# Patient Record
Sex: FEMALE | Race: WHITE | NOT HISPANIC OR LATINO | Employment: FULL TIME | URBAN - METROPOLITAN AREA
[De-identification: names, ages, dates, MRNs, and addresses within clinical notes are randomized per-mention and may not be internally consistent; named-entity substitution may affect disease eponyms.]

---

## 2017-02-03 ENCOUNTER — ALLSCRIPTS OFFICE VISIT (OUTPATIENT)
Dept: OTHER | Facility: OTHER | Age: 32
End: 2017-02-03

## 2017-02-03 ENCOUNTER — LAB REQUISITION (OUTPATIENT)
Dept: LAB | Facility: HOSPITAL | Age: 32
End: 2017-02-03
Payer: COMMERCIAL

## 2017-02-03 DIAGNOSIS — N92.6 IRREGULAR MENSTRUATION: ICD-10-CM

## 2017-02-03 DIAGNOSIS — Z11.3 ENCOUNTER FOR SCREENING FOR INFECTIONS WITH PREDOMINANTLY SEXUAL MODE OF TRANSMISSION: ICD-10-CM

## 2017-02-03 DIAGNOSIS — Z01.419 ENCOUNTER FOR GYNECOLOGICAL EXAMINATION WITHOUT ABNORMAL FINDING: ICD-10-CM

## 2017-02-03 PROCEDURE — 87624 HPV HI-RISK TYP POOLED RSLT: CPT | Performed by: PHYSICIAN ASSISTANT

## 2017-02-03 PROCEDURE — G0145 SCR C/V CYTO,THINLAYER,RESCR: HCPCS | Performed by: PHYSICIAN ASSISTANT

## 2017-02-07 LAB — HPV RRNA GENITAL QL NAA+PROBE: NORMAL

## 2017-02-08 LAB
LAB AP GYN PRIMARY INTERPRETATION: NORMAL
Lab: NORMAL

## 2017-02-28 ENCOUNTER — GENERIC CONVERSION - ENCOUNTER (OUTPATIENT)
Dept: OTHER | Facility: OTHER | Age: 32
End: 2017-02-28

## 2017-03-30 ENCOUNTER — GENERIC CONVERSION - ENCOUNTER (OUTPATIENT)
Dept: OTHER | Facility: OTHER | Age: 32
End: 2017-03-30

## 2017-04-03 ENCOUNTER — GENERIC CONVERSION - ENCOUNTER (OUTPATIENT)
Dept: OTHER | Facility: OTHER | Age: 32
End: 2017-04-03

## 2017-05-03 ENCOUNTER — ALLSCRIPTS OFFICE VISIT (OUTPATIENT)
Dept: OTHER | Facility: OTHER | Age: 32
End: 2017-05-03

## 2017-07-05 ENCOUNTER — TRANSCRIBE ORDERS (OUTPATIENT)
Dept: ADMINISTRATIVE | Facility: HOSPITAL | Age: 32
End: 2017-07-05

## 2017-07-05 ENCOUNTER — APPOINTMENT (OUTPATIENT)
Dept: LAB | Facility: HOSPITAL | Age: 32
End: 2017-07-05
Payer: COMMERCIAL

## 2017-07-05 DIAGNOSIS — Z11.1 SCREENING EXAMINATION FOR PULMONARY TUBERCULOSIS: Primary | ICD-10-CM

## 2017-07-05 DIAGNOSIS — Z11.1 SCREENING EXAMINATION FOR PULMONARY TUBERCULOSIS: ICD-10-CM

## 2017-07-05 PROCEDURE — 86480 TB TEST CELL IMMUN MEASURE: CPT

## 2017-07-05 PROCEDURE — 36415 COLL VENOUS BLD VENIPUNCTURE: CPT

## 2017-07-07 LAB
ANNOTATION COMMENT IMP: NORMAL
GAMMA INTERFERON BACKGROUND BLD IA-ACNC: 0.04 IU/ML
M TB IFN-G BLD-IMP: NEGATIVE
M TB IFN-G CD4+ BCKGRND COR BLD-ACNC: <0.01 IU/ML
M TB IFN-G CD4+ T-CELLS BLD-ACNC: 0.03 IU/ML
MITOGEN IGNF BLD-ACNC: 8.22 IU/ML
QUANTIFERON-TB GOLD IN TUBE: NORMAL
SERVICE CMNT-IMP: NORMAL

## 2017-08-04 ENCOUNTER — TRANSCRIBE ORDERS (OUTPATIENT)
Dept: ADMINISTRATIVE | Facility: HOSPITAL | Age: 32
End: 2017-08-04

## 2017-08-04 ENCOUNTER — APPOINTMENT (OUTPATIENT)
Dept: LAB | Facility: HOSPITAL | Age: 32
End: 2017-08-04
Payer: COMMERCIAL

## 2017-08-04 DIAGNOSIS — Z00.8 HEALTH EXAMINATION IN POPULATION SURVEY: ICD-10-CM

## 2017-08-04 DIAGNOSIS — Z00.8 HEALTH EXAMINATION IN POPULATION SURVEY: Primary | ICD-10-CM

## 2017-08-04 LAB
CHOLEST SERPL-MCNC: 148 MG/DL (ref 50–200)
EST. AVERAGE GLUCOSE BLD GHB EST-MCNC: 120 MG/DL
HBA1C MFR BLD: 5.8 % (ref 4.2–6.3)
HDLC SERPL-MCNC: 40 MG/DL (ref 40–60)
LDLC SERPL CALC-MCNC: 89 MG/DL (ref 0–100)
TRIGL SERPL-MCNC: 93 MG/DL

## 2017-08-04 PROCEDURE — 80061 LIPID PANEL: CPT

## 2017-08-04 PROCEDURE — 83036 HEMOGLOBIN GLYCOSYLATED A1C: CPT

## 2017-09-29 ENCOUNTER — TRANSCRIBE ORDERS (OUTPATIENT)
Dept: ADMINISTRATIVE | Facility: HOSPITAL | Age: 32
End: 2017-09-29

## 2017-09-29 ENCOUNTER — APPOINTMENT (OUTPATIENT)
Dept: LAB | Facility: HOSPITAL | Age: 32
End: 2017-09-29
Payer: COMMERCIAL

## 2017-09-29 DIAGNOSIS — Z11.1 SCREENING EXAMINATION FOR PULMONARY TUBERCULOSIS: ICD-10-CM

## 2017-09-29 DIAGNOSIS — Z11.1 SCREENING EXAMINATION FOR PULMONARY TUBERCULOSIS: Primary | ICD-10-CM

## 2017-09-29 PROCEDURE — 36415 COLL VENOUS BLD VENIPUNCTURE: CPT

## 2017-09-29 PROCEDURE — 86480 TB TEST CELL IMMUN MEASURE: CPT

## 2017-10-01 LAB
ANNOTATION COMMENT IMP: NORMAL
GAMMA INTERFERON BACKGROUND BLD IA-ACNC: 0.04 IU/ML
M TB IFN-G BLD-IMP: NEGATIVE
M TB IFN-G CD4+ BCKGRND COR BLD-ACNC: 0.04 IU/ML
M TB IFN-G CD4+ T-CELLS BLD-ACNC: 0.08 IU/ML
MITOGEN IGNF BLD-ACNC: >10 IU/ML
QUANTIFERON-TB GOLD IN TUBE: NORMAL
SERVICE CMNT-IMP: NORMAL

## 2017-10-25 ENCOUNTER — APPOINTMENT (OUTPATIENT)
Dept: LAB | Facility: HOSPITAL | Age: 32
End: 2017-10-25
Payer: COMMERCIAL

## 2017-10-25 ENCOUNTER — GENERIC CONVERSION - ENCOUNTER (OUTPATIENT)
Dept: OTHER | Facility: OTHER | Age: 32
End: 2017-10-25

## 2017-10-25 DIAGNOSIS — N92.6 IRREGULAR MENSTRUATION: ICD-10-CM

## 2017-10-25 LAB — B-HCG SERPL-ACNC: <2 MIU/ML

## 2017-10-25 PROCEDURE — 36415 COLL VENOUS BLD VENIPUNCTURE: CPT

## 2017-10-25 PROCEDURE — 84702 CHORIONIC GONADOTROPIN TEST: CPT

## 2017-10-26 ENCOUNTER — GENERIC CONVERSION - ENCOUNTER (OUTPATIENT)
Dept: OTHER | Facility: OTHER | Age: 32
End: 2017-10-26

## 2018-01-10 NOTE — PROGRESS NOTES
Assessment    1  Encounter for gynecological examination without abnormal finding (V72 31) (Z01 419)    Plan  Encounter for gynecological examination without abnormal finding    · Follow-up visit in 1 year Evaluation and Treatment  Follow-up  Status: Hold For -  Scheduling  Requested for: 10XWE7146   Ordered; For: Encounter for gynecological examination without abnormal finding; Ordered By: Juvenal Marina Performed:  Due: 53UIQ2778  Infertility    · 3 - Dwayne Javier MD, Love Gallagher  (Obstetrics/Gynecology) Physician Referral  Consult  Status: Hold  For - Scheduling  Requested for: 19XDN6454   Ordered; For: Infertility; Ordered By: Juvenal  Performed:  Due: 41LOK5994  are Referring to a non- Preferred Provider : Services not provided in network  Care Summary provided  : Yes    Discussion/Summary  healthy adult female the risks and benefits of cervical cancer screening were discussed Pap test with reflex HPV testing was done today Testing was done today for chlamydia and gonorrhea  Breast cancer screening: the risks and benefits of breast cancer screening were discussed and monthly self breast exam was advised  Advice and education were given regarding weight bearing exercise, calcium supplements and vitamin D supplements  Chief Complaint  New pt here for her yearly exam, c/o trying to conceive X 4 years  History of Present Illness  HPI: 35year old white female here for yearly exam, no complaints  Cycles are regular, not heavy or crampy  She is  for 5 years and has not used contraception in that time  She had not actively tried for pregnancy until the last year but had not prevented, either  She has no history of chlamydia, pelvic trauma or surgery, nor has her   We discussed starting with a semen analysis  She takes a prenatal vitamin when she remembers  GYN HM, Adult Female [facility]: The patient is being seen for a health maintenance evaluation  General Health:  The patient's health since the last visit is described as good  Lifestyle:  She does not exercise regularly  She does not use tobacco  She consumes alcohol  She reports occasional alcohol use  She denies drug use  Reproductive health: the patient is premenopausal   she reports no menstrual problems  she uses no contraception  she is sexually active  she denies prior pregnancies  Screening: Cervical cancer screening includes uncertain timing of her last pap smear  Active Problems    1  Allergic rhinitis (477 9) (J30 9)   2  Flank pain (789 09) (R10 9)   3  Lumbar strain (847 2) (S39 012A)   4  Moderate persistent asthma (493 90) (J45 40)    Past Medical History    · History of Abdominal pain, suprapubic (789 09) (R10 30)   · History of Cough (786 2) (R05)   · History of Depression screening (V79 0) (Z13 89)   · History of Eustachian tube dysfunction (381 81) (H69 80)   · History of acute sinusitis (V12 69) (Z87 09)   · History of otitis media (V12 49) (Z86 69)   · History of Skin lesion (709 9) (L98 9)    Surgical History    · Denied: History Of Prior Surgery    Family History    · Family history of multiple sclerosis (V17 2) (Z82 0)   · Family history of skin cancer (V16 8) (Z80 8)    · Family history of diabetes mellitus (DM) (V18 0) (Z83 3)   · Family history of pericarditis (V17 49) (Z82 49)    Social History    · Alcohol use (V49 89) (F10 99)   · Currently sexually active   · Denied: History of Drug use   · Former smoker (B65 72) (N12 907)   ·    · No drug use   · Social alcohol use (F10 99)    Current Meds   1  Advair Diskus 250-50 MCG/DOSE Inhalation Aerosol Powder Breath Activated; inhale 1   puff by mouth twice a day; Therapy: 45EJF9075 to (Last Rx:02Oct2015)  Requested for: 02Oct2015 Ordered   2  Montelukast Sodium 10 MG Oral Tablet; TAKE 1 TABLET DAILY ( OFFICE VISIT   NEEDED); Therapy: 75LAB8680 to (Last Rx:02Oct2015)  Requested for: 02Oct2015 Ordered   3   Ventolin  (90 Base) MCG/ACT Inhalation Aerosol Solution; INHALE 2 PUFFS   EVERY 4 HOURS AS NEEDED; Therapy: 80RQG5870 to (Evaluate:19Apr2016)  Requested for: 02Oct2015; Last   Rx:02Oct2015 Ordered    Allergies    1  Sulfa Drugs    Vitals   Recorded: 23HCO7443 06:89OM   Systolic 602, LUE, Sitting   Diastolic 78, LUE, Sitting   Height 5 ft 7 5 in   Weight 248 lb    BMI Calculated 38 27   BSA Calculated 2 23   LMP 21Jan2016     Physical Exam    Constitutional   General appearance: No acute distress, well appearing and well nourished  Neck   Neck: Normal, supple, trachea midline, no masses  Genitourinary   External genitalia: Normal and no lesions appreciated  Vagina: Normal, no lesions or dryness appreciated  Urethra: Normal     Urethral meatus: Normal     Bladder: Normal, soft, non-tender and no prolapse or masses appreciated  Cervix: Normal, no palpable masses  Uterus: Normal, non-tender, not enlarged, and no palpable masses  Adnexa/parametria: Normal, non-tender and no fullness or masses appreciated  Chest   Breasts: Normal and no dimpling or skin changes noted  Abdomen   Abdomen: Normal, non-tender, and no organomegaly noted  Lymphatic   Palpation of lymph nodes in neck, axillae, groin and/or other locations: No lymphadenopathy or masses noted         Future Appointments    Date/Time Provider Specialty Site   02/02/2017 08:00 AM Judi Rios [facility] Obstetrics/Gynecology [facility]     Signatures   Electronically signed by : Ramos Tomas [facility]; Jan 28 2016 11:40AM EST                       (Author)    Electronically signed by : LUIS Ruiz ; Jan 28 2016  1:01PM EST

## 2018-01-11 NOTE — MISCELLANEOUS
Message   Recorded as Task   Date: 11/03/2016 11:06 AM, Created By: Priyank Camacho   Task Name: Call Back   Assigned To: Ronda Castro   Regarding Patient: Jordy Boston, Status: In Progress   Comment:    Priyank Camacho - 03 Nov 2016 11:06 AM     TASK CREATED  Caller: Self; General Medical Question; [phone] (Home)  pt called - was given an rx for her  to go & have sperm tested - they misplaced - requeting new to be mailed & a call to pt with recomendations as to where to go  please advise  [facility] [place] Nov 2016 11:24 AM     TASK EDITED  Not sure how to write requisition  I think you want  to have sa at [facility]? Imani Jamil - 29 Nov 2016 11:29 AM     TASK REPLIED TO: Previously Assigned To Pixtr is on my computer  I recommend Dr Wayne Tinajero - 39 Nov 2016 12:17 PM     TASK IN PROGRESS   Samantha Tinajero - 22 Nov 2016 12:31 PM     TASK EDITED  I lm for pt - shall I send rx for s/a  I also left [employer] [facility] # on her phone   [address] Nov 2016 3:11 PM     TASK EDITED  Rx sent to pt        Active Problems    1  Allergic rhinitis (477 9) (J30 9)   2  Encounter for gynecological examination without abnormal finding (V72 31) (Z01 419)   3  Infertility   4  Lumbar strain (847 2) (S39 012A)   5  Moderate persistent asthma (493 90) (J45 40)   6  Screening for STD (sexually transmitted disease) (V74 5) (Z11 3)    Current Meds   1  Albuterol Sulfate (2 5 MG/3ML) 0 083% Inhalation Nebulization Solution; Inhale 1 unit   dose twice a day and every 4-6 hours by nebulizer for shortness of   breath or wheezing; Therapy: 57EIO3450 to [phone])  Requested for: 66ZPE2542; Last   Rx:29Jun2016 Ordered   2  Breo Ellipta 200-25 MCG/INH Inhalation Aerosol Powder Breath Activated; INHALE 1   PUFFS Daily; Therapy: 65DEX9301 to (Evaluate:09Vfz5184)  Requested for: 87Pwv2425; Last   Rx:87Qqj0037 Ordered   3   Cetirizine HCl - 10 MG Oral Tablet; TAKE 1 TABLET DAILY AS DIRECTED; Therapy: 13OAN2439 to (Evaluate:66Iod0844); Last Rx:14Jun2016 Ordered   4  Ventolin  (90 Base) MCG/ACT Inhalation Aerosol Solution; INHALE 2 PUFFS   EVERY 4 HOURS AS NEEDED; Therapy: 58GPT4992 to (Evaluate:22Jun2017)  Requested for: 06YCX6517; Last   Rx:27Jun2016 Ordered    Allergies    1  Sulfa Drugs    Signatures   Electronically signed by :  Jose Raul Etienne, ; Nov  3 2016  3:11PM EST                       (Author)

## 2018-01-11 NOTE — MISCELLANEOUS
Message   Recorded as Task   Date: 03/30/2017 12:46 PM, Created By: [facility]   Task Name: Care Coordination   Assigned To: Hernando Marcelino   Regarding Patient: Ernesto Neal, Status: In Progress   William Herrmann - 30 Mar 2017 12:46 PM     TASK CREATED  "Sylvie Ledesma" from [employer] [facility] pts partner "Shaq Johnson" was in for seamen analysis  States the Rx did not have a diagnosis code on it  They are requesting updated Rx  Their Phone is [phone], fax is [phone]  [facility] - 30 Mar 2017 1:04 PM     TASK IN PROGRESS   [facility] - 30 Mar 2017 1:14 PM     TASK EDITED  I have an index card (my own) ambrosio Haines uses [id number] [phone]   [facility] - 30 Mar 2017 1:15 PM     TASK EDITED  It is investigative testing for male   Or do you have a code I can use? Imani Huff - 30 Mar 2017 1:21 PM     TASK REPLIED TO: Previously Assigned To Imani Huff  dx would be primary infertility   [facility] - 30 Mar 2017 1:39 PM     TASK EDITED  I called and gave them the code n 97 9 - primary infertility        Active Problems    1  Allergic rhinitis (477 9) (J30 9)   2  Encounter for gynecological examination without abnormal finding (V72 31) (Z01 419)   3  Infertility   4  Irregular menstrual cycle (626 4) (N92 6)   5  Lumbar strain (847 2) (S39 012A)   6  Moderate persistent asthma (493 90) (J45 40)   7  Screening for STD (sexually transmitted disease) (V74 5) (Z11 3)    Current Meds   1  Breo Ellipta 200-25 MCG/INH Inhalation Aerosol Powder Breath Activated; INHALE 1   PUFFS Daily; Therapy: 94FUS1253 to (Evaluate:22Lgj9795)  Requested for: 63Dzc7345; Last   Rx:63Ijm1123 Ordered   2  Cetirizine HCl - 10 MG Oral Tablet; TAKE 1 TABLET DAILY AS DIRECTED; Therapy: 67EZE8323 to (Evaluate:94Svj1880); Last Rx:14Jun2016 Ordered   3  CitraNatal [place] 27-1-260 MG Oral Capsule; One po daily; Therapy: 87HVD1917 to (Last Rx:37Wpt9667)  Requested for: 42Oto7390 Ordered   4   Ventolin  (90 Base) MCG/ACT Inhalation Aerosol Solution; INHALE 2 PUFFS   EVERY 4 HOURS AS NEEDED; Therapy: 03MKR3835 to (Evaluate:22Jun2017)  Requested for: 15NSC1355; Last   Rx:27Jun2016 Ordered    Allergies    1  Sulfa Drugs    Signatures   Electronically signed by :  Fransico Etienne, ; Mar 30 2017  1:39PM EST                       (Author)

## 2018-01-12 NOTE — MISCELLANEOUS
Message   Recorded as Task   Date: 10/25/2017 02:19 PM, Created By: Sachi Lincoln   Task Name: Go to Result   Assigned To: Charlotte Calvillo   Regarding Patient: Tiffany Ochoa, Status: In Progress   Comment:    Imani Huff - 25 Oct 2017 2:19 PM     TASK CREATED  please let dayne know that her HCG is negative  Thx   Yadira Owusu - 25 Oct 2017 2:20 PM     TASK IN PROGRESS   Yadira Owusu - 25 Oct 2017 2:23 PM     TASK EDITED  Patient is aware of results  Active Problems    1  Allergic rhinitis (477 9) (J30 9)   2  Encounter for gynecological examination without abnormal finding (V72 31) (Z01 419)   3  Infertility   4  Irregular menstrual cycle (626 4) (N92 6)   5  Lumbar strain (847 2) (S39 012A)   6  Moderate persistent asthma (493 90) (J45 40)   7  Nausea and vomiting (787 01) (R11 2)   8  Screening for STD (sexually transmitted disease) (V74 5) (Z11 3)    Current Meds   1  Breo Ellipta 200-25 MCG/INH Inhalation Aerosol Powder Breath Activated; take one   inhalation a day; Therapy: 40MFD3634 to (JUBTVVFD:93DIV6882)  Requested for: 50Agn6998; Last   Rx:18Cpv1890; Status: ACTIVE - Renewal Denied Ordered   2  Ondansetron 8 MG Oral Tablet Disintegrating; TAKE 1 TABLET Every 8 hours PRN; Therapy: 10KHP1128 to (Kyra Collet)  Requested for: 04PJO5183; Last   Rx:61Pyi8764 Ordered   3  Ventolin  (90 Base) MCG/ACT Inhalation Aerosol Solution; INHALE 2 PUFFS   EVERY 4-6 HOURS AS NEEDED; Therapy: 62UXM3718 to (Evaluate:06Iiw8701)  Requested for: 95BNP5070; Last   Rx:13Gwy6773 Ordered    Allergies    1   Sulfa Drugs    Signatures   Electronically signed by : Sapphire Merino, ; Oct 25 2017  2:23PM EST                       (Author)

## 2018-01-13 VITALS
HEIGHT: 67 IN | WEIGHT: 247 LBS | HEART RATE: 96 BPM | DIASTOLIC BLOOD PRESSURE: 80 MMHG | TEMPERATURE: 98.1 F | SYSTOLIC BLOOD PRESSURE: 106 MMHG | BODY MASS INDEX: 38.77 KG/M2

## 2018-01-14 VITALS
BODY MASS INDEX: 39.24 KG/M2 | WEIGHT: 250 LBS | SYSTOLIC BLOOD PRESSURE: 120 MMHG | HEIGHT: 67 IN | DIASTOLIC BLOOD PRESSURE: 72 MMHG

## 2018-01-14 NOTE — MISCELLANEOUS
Message   Recorded as Task   Date: 04/03/2017 12:17 PM, Created By: Swapna Johnson   Task Name: Call Back   Assigned To: Andreina Muñoz   Regarding Patient: Barbara Andrew, Status: In Progress   Comment:    Caitlyn Gross - 03 Apr 2017 12:17 PM     TASK CREATED  Caller: Self; Results Inquiry; [phone] (Home)  Pt calling for result of husbands semen analysis   Sherrie Corona - 03 Apr 2017 12:24 PM     TASK IN PROGRESS   CjSherrie - 03 Apr 2017 12:32 PM     TASK EDITED  pt called req current sa done 1-1 1/2 weeks ago    she will call christin lugo for result to be faxed and req last sa to be faxed to her at [phone]        Active Problems    1  Allergic rhinitis (477 9) (J30 9)   2  Encounter for gynecological examination without abnormal finding (V72 31) (Z01 419)   3  Infertility   4  Irregular menstrual cycle (626 4) (N92 6)   5  Lumbar strain (847 2) (S39 012A)   6  Moderate persistent asthma (493 90) (J45 40)   7  Screening for STD (sexually transmitted disease) (V74 5) (Z11 3)    Current Meds   1  Breo Ellipta 200-25 MCG/INH Inhalation Aerosol Powder Breath Activated; INHALE 1   PUFFS Daily; Therapy: 81GNX0918 to (Evaluate:46Fun1191)  Requested for: 02Hho3869; Last   Rx:96Xje8502 Ordered   2  Cetirizine HCl - 10 MG Oral Tablet; TAKE 1 TABLET DAILY AS DIRECTED; Therapy: 89LPJ9391 to (Evaluate:31Fvr9191); Last Rx:14Jun2016 Ordered   3  CitraNatal [place] 27-1-260 MG Oral Capsule; One po daily; Therapy: 05VTJ2003 to (Last Rx:84Nks3052)  Requested for: 03Syq8546 Ordered   4  Ventolin  (90 Base) MCG/ACT Inhalation Aerosol Solution; INHALE 2 PUFFS   EVERY 4 HOURS AS NEEDED; Therapy: 77TQL7951 to (Evaluate:22Jun2017)  Requested for: 29GFS2061; Last   Rx:27Jun2016 Ordered    Allergies    1   Sulfa Drugs    Signatures   Electronically signed by : Vane Sandoval, ; Apr  3 2017 12:32PM EST                       (Author)

## 2018-01-15 ENCOUNTER — HOSPITAL ENCOUNTER (OUTPATIENT)
Dept: ULTRASOUND IMAGING | Facility: HOSPITAL | Age: 33
Discharge: HOME | End: 2018-01-15
Payer: COMMERCIAL

## 2018-01-15 ENCOUNTER — ALLSCRIPTS OFFICE VISIT (OUTPATIENT)
Dept: OTHER | Facility: OTHER | Age: 33
End: 2018-01-15

## 2018-01-15 ENCOUNTER — APPOINTMENT (OUTPATIENT)
Dept: LAB | Facility: HOSPITAL | Age: 33
End: 2018-01-15
Payer: COMMERCIAL

## 2018-01-15 DIAGNOSIS — Z32.01 ENCOUNTER FOR PREGNANCY TEST, RESULT POSITIVE: ICD-10-CM

## 2018-01-15 DIAGNOSIS — N93.9 ABNORMAL UTERINE AND VAGINAL BLEEDING, UNSPECIFIED (CODE): ICD-10-CM

## 2018-01-15 LAB
ABO GROUP BLD: NORMAL
B-HCG SERPL-ACNC: ABNORMAL MIU/ML
BLD GP AB SCN SERPL QL: NEGATIVE
RH BLD: POSITIVE
SPECIMEN EXPIRATION DATE: NORMAL

## 2018-01-15 PROCEDURE — 36415 COLL VENOUS BLD VENIPUNCTURE: CPT

## 2018-01-15 PROCEDURE — 86900 BLOOD TYPING SEROLOGIC ABO: CPT

## 2018-01-15 PROCEDURE — 86901 BLOOD TYPING SEROLOGIC RH(D): CPT

## 2018-01-15 PROCEDURE — 76801 OB US < 14 WKS SINGLE FETUS: CPT

## 2018-01-15 PROCEDURE — 84702 CHORIONIC GONADOTROPIN TEST: CPT

## 2018-01-15 PROCEDURE — 86850 RBC ANTIBODY SCREEN: CPT

## 2018-01-16 NOTE — MISCELLANEOUS
Message   Recorded as Task   Date: 10/25/2017 07:12 AM, Created By: Awilda Gutiererz   Task Name: Medical Complaint Callback   Assigned To: Hernando Marcelino   Regarding Patient: Ernesto Neal, Status: In Progress   Comment:    Caitlyn Gross - 25 Oct 2017 7:12 AM     TASK CREATED  Caller: Self; Medical Complaint; [phone] (Home)  Pt believes she conceived last week and is bleeding  Pt has a history of miscarriage and would like order for blood test   Pt would like to speak with nurse ASAP  [facility] - 25 Oct 2017 8:41 AM     TASK IN PROGRESS   [facility] - 25 Oct 2017 8:53 AM     TASK EDITED  Pt trying desperately to conceive  !!  Said her lmp 10/9 thru 10/13  She said she ovulated 17 th and 18  Began spotting yesterday  (She uses ovulation kits)  She said this happened 2 mos ago and she spotted till period began  She said same thing happened a yr ago  I do not think she is pregnant but pt wants hcg - I said I needed your opinion  Has seen [facility] in past   Thanks   Imani Huff - 25 Oct 2017 9:07 AM     TASK REPLIED TO: Previously Assigned To Imani Huff  If she wants an HCG we can send her for one    Next step is VERNON  They can help straighten out her periods while helping her to get pregnant     [facility] - 25 Oct 2017 9:21 AM     TASK EDITED  Marleni Linda faxed to [facility] for [address] - 25 Oct 2017 9:25 AM     TASK EDITED  Check hcg tomorrow   [facility] - 25 Oct 2017 9:26 AM     TASK EDITED   Kenia Garcia - 25 Oct 2017 10:59 AM     TASK EDITED   Stephanie Hart - 26 Oct 2017 8:30 AM     TASK IN PROGRESS   Chen Holly - 26 Oct 2017 8:31 AM     TASK EDITED   [facility] - 26 Oct 2017 8:32 AM     TASK EDITED  lmtcb   [facility] - 26 Oct 2017 8:32 AM     TASK IN PROGRESS   [facility] - 26 Oct 2017 9:22 AM     TASK EDITED  Pt does not have infertility on her plan - will be added to husbands plan in Jan - will then see Dr Macarena Farah Phu Mask - 26 Oct 2017 9:22 AM     TASK EDITED        Active Problems    1  Allergic rhinitis (477 9) (J30 9)   2  Encounter for gynecological examination without abnormal finding (V72 31) (Z01 419)   3  Infertility   4  Irregular menstrual cycle (626 4) (N92 6)   5  Lumbar strain (847 2) (S39 012A)   6  Moderate persistent asthma (493 90) (J45 40)   7  Nausea and vomiting (787 01) (R11 2)   8  Screening for STD (sexually transmitted disease) (V74 5) (Z11 3)    Current Meds   1  Breo Ellipta 200-25 MCG/INH Inhalation Aerosol Powder Breath Activated; take one   inhalation a day; Therapy: 33UNA7996 to (MMNVDWWR:96EWD1752)  Requested for: 94Clc3735; Last   Rx:13Gzc7446; Status: ACTIVE - Renewal Denied Ordered   2  Ondansetron 8 MG Oral Tablet Disintegrating; TAKE 1 TABLET Every 8 hours PRN; Therapy: 19TOC9175 to (Guille Agrawal)  Requested for: 99CPR3234; Last   Rx:41Lfu0794 Ordered   3  Ventolin  (90 Base) MCG/ACT Inhalation Aerosol Solution; INHALE 2 PUFFS   EVERY 4-6 HOURS AS NEEDED; Therapy: 18JNC4575 to (Evaluate:13Ogl4490)  Requested for: 07XHA1332; Last   Rx:41Jym2503 Ordered    Allergies    1  Sulfa Drugs    Signatures   Electronically signed by :  Laura Etienne, ; Oct 26 2017  9:22AM EST                       (Author)

## 2018-01-16 NOTE — MISCELLANEOUS
Message   Recorded as Task   Date: 01/07/2016 09:00 AM, Created By: Pam Bentley   Task Name: Follow Up   Assigned To: Melissa Farfan   Regarding Patient: Kayleigh , Status: In Progress   Comment:    Brittney Jenkins - 07 Jan 2016 9:00 AM     TASK CREATED  Caller: Self; General Medical Question; [phone] (Home); [phone] (Work)  PLEASE CALL PT REGARDING HER Z-PACK  SHE BELIEVES THAT THE 3 DAY DOSE WILL NOT BE ENOUGH AND WAS UNDER THE IMPRESSION THAT IT WOULD BE A 5 DAY DOSE  PLEASE ADVISE   [phone] IS HER CALL BACK NUMBER  THANK YOU  [facility]   Melissa Farfan - 07 Jan 2016 11:34 AM     TASK IN PROGRESS   Melissa Farfan - 07 Jan 2016 11:36 AM     TASK REASSIGNED: Previously Assigned To Kamala,Clinical Team  do you recall if you wanted pt to have 5 days of zithromax? Melissa Farfan - 08 Jan 2016 10:29 AM     TASK REASSIGNED: Previously Assigned To Melissa Farfan Timothy - 08 Jan 2016 10:38 AM     TASK REPLIED TO: Previously Assigned To Du Washburn  No I wanted Zithromax 500 mg one a day for 3 days  This is equal efficacy as the 250 for 5 days  They both are in the system for 10 days   Melissa Farfan - 12 Jan 2016 1:24 PM     TASK EDITED  [facility] informing pt of TS response        Active Problems    1  Acute sinusitis (461 9) (J01 90)   2  Allergic rhinitis (477 9) (J30 9)   3  Cough (786 2) (R05)   4  Depression screening (V79 0) (Z13 89)   5  Eustachian tube dysfunction (381 81) (H69 80)   6  Flank pain (789 09) (R10 9)   7  Lumbar strain (847 2) (S39 012A)   8  Moderate persistent asthma (493 90) (J45 40)   9  Otitis media (382 9) (H66 90)    Current Meds   1  Advair Diskus 250-50 MCG/DOSE Inhalation Aerosol Powder Breath Activated; inhale 1   puff by mouth twice a day; Therapy: 05MJW1240 to (Last Rx:02Oct2015)  Requested for: 02Oct2015 Ordered   2  Azelastine HCl - 0 1 % Nasal Solution; USE 2 SPRAYS IN EACH       NOSTRIL TWICE   DAILY;    Therapy: 07DHL5975 to (Last FJ:17RTO0226)  Requested for: 19QJP4291 Ordered   3  Azithromycin 500 MG Oral Tablet; TAKE 1 TABLET DAILY WITH FOOD; Therapy: 43ACR4748 to (Last Rx:06Jan2016)  Requested for: 70TWI6237 Ordered   4  Montelukast Sodium 10 MG Oral Tablet (Singulair); TAKE 1 TABLET DAILY ( OFFICE   VISIT NEEDED); Therapy: 54ZGW6289 to (Last Rx:02Oct2015)  Requested for: 02Oct2015 Ordered   5  Promethazine-DM 6 25-15 MG/5ML Oral Syrup; TAKE 5 ML EVERY 4 TO 6 HOURS AS   NEEDED; Therapy: 52DCB2110 to (Last Rx:06Jan2016)  Requested for: 94EVR3982 Ordered   6  Ventolin  (90 Base) MCG/ACT Inhalation Aerosol Solution; INHALE 2 PUFFS   EVERY 4 HOURS AS NEEDED; Therapy: 75PIC1428 to (Evaluate:19Apr2016)  Requested for: 02Oct2015; Last   Rx:02Oct2015 Ordered    Allergies    1   Sulfa Drugs    Signatures   Electronically signed by : Lien Matute, ; Jan 12 2016  1:25PM EST                       (Author)

## 2018-01-17 NOTE — MISCELLANEOUS
Message   Recorded as Task   Date: 02/27/2017 01:50 PM, Created By: Carly Myles   Task Name: Call Back   Assigned To: Ronda Castro   Regarding Patient: Jordy Boston, Status: In Progress   Comment:    Jeannie Lee - 27 Feb 2017 1:50 PM     TASK CREATED  PT CALLED FOR RESULTS OF HER HUSBANDS SEMEN ANYALSIS  [phone]   Edith Baca - 27 Feb 2017 2:10 PM     TASK REASSIGNED: Previously Assigned To Pramod Dye - 27 Feb 2017 4:04 PM     TASK EDITED  Pt said he went to [employer]  and she said Maile Cox was faxed to our office already  I truly don't know where to look  [facility] and have them fax his sa to gyn fax  Husbands name is [employer]  BD 11/29/79  Pt wants to be called as soon as we get it!!   Samantha Tinajero - 27 Feb 2017 4:04 PM     TASK EDITED  Await fax   Samantha Tinajero - 27 Feb 2017 4:04 PM     TASK IN PROGRESS   Samantha Tinajero - 27 Feb 2017 4:31 PM     TASK EDITED  I missed the sa on chart  Please let us know F/U  Sorry and thank you   Imani Huff - 28 Feb 2017 7:35 AM     TASK REPLIED TO: Previously Assigned To Imani Huff  volume and number of normal shaped sperm are normal, but percentage of sperm actively moving in the right direction is slightly low  They recommend repeat semen analysis  Can either give her a slip for this or send them there for evaluation  Sherrie Corona - 28 Feb 2017 7:43 AM     TASK EDITED  lm for pt tcb   Sherrie Corona - 28 Feb 2017 8:03 AM     TASK EDITED  spoke [facility] pt, she requests that aslip for repeat sa be mailed to her    done        Active Problems    1  Allergic rhinitis (477 9) (J30 9)   2  Encounter for gynecological examination without abnormal finding (V72 31) (Z01 419)   3  Infertility   4  Irregular menstrual cycle (626 4) (N92 6)   5  Lumbar strain (847 2) (S39 012A)   6  Moderate persistent asthma (493 90) (J45 40)   7  Screening for STD (sexually transmitted disease) (V74 5) (Z11 3)    Current Meds   1   Breo Ellipta 200-25 MCG/INH Inhalation Aerosol Powder Breath Activated; INHALE 1   PUFFS Daily; Therapy: 97PXF4882 to (Evaluate:97Bsm3264)  Requested for: 41Gqi0574; Last   Rx:92Ksy7585 Ordered   2  Cetirizine HCl - 10 MG Oral Tablet; TAKE 1 TABLET DAILY AS DIRECTED; Therapy: 79QAG9839 to (Evaluate:87Jaz7146); Last Rx:14Jun2016 Ordered   3  CitraNatal [place] 27-1-260 MG Oral Capsule; One po daily; Therapy: 34EBM3969 to (Last Rx:03Feb2017)  Requested for: 03Feb2017 Ordered   4  Ventolin  (90 Base) MCG/ACT Inhalation Aerosol Solution; INHALE 2 PUFFS   EVERY 4 HOURS AS NEEDED; Therapy: 06ATK9091 to (Evaluate:22Jun2017)  Requested for: 69BZL2397; Last   Rx:27Jun2016 Ordered    Allergies    1   Sulfa Drugs    Signatures   Electronically signed by : Maia Jane, ; Feb 28 2017  8:03AM EST                       (Author)

## 2018-01-18 NOTE — MISCELLANEOUS
Message   Recorded as Task   Date: 11/03/2016 11:06 AM, Created By: Elena Mckeon   Task Name: Call Back   Assigned To: Dori Stiles   Regarding Patient: Bisi John, Status: In Progress   Comment:    Elena Pereiradle - 03 Nov 2016 11:06 AM     TASK CREATED  Caller: Self; General Medical Question; [phone] (Home)  pt called - was given an rx for her  to go & have sperm tested - they misplaced - requeting new to be mailed & a call to pt with recomendations as to where to go  please advise  [address] [place] [address] Nov 2016 11:24 AM     TASK EDITED  Not sure how to write requisition  I think you want  to have sa at [facility]? Imani Jamil - 47 Nov 2016 11:29 AM     TASK REPLIED TO: Previously Assigned To Consult A Doctor is on my computer  I recommend Dr Wayne Anthony - 02 Nov 2016 12:17 PM     TASK IN PROGRESS   Rosalinda Anthony - 89 Nov 2016 12:31 PM     TASK EDITED  I lm for pt - shall I send rx for s/a  I also left [facility] # on her phone   [address] [address] Nov 2016 3:11 PM     TASK EDITED  Rx sent to pt   Rosalinda Anthony - 95 Nov 2016 3:11 PM     TASK COMPLETED   Rosalinda Anthony - 10 Nov 2016 5:02 PM     TASK REACTIVATED   Rosalinda Anthony - 10 Nov 2016 5:04 PM     TASK EDITED  I think pt recalled today - I got info from [employer]  I lm for pt - to cb  Originally [facility] recommended [facility], but pt can go to [facility] or [facility] - 10 Nov 2016 5:05 PM     TASK EDITED   lmtcb   Rosalinda Anthony - 10 Nov 2016 5:05 PM     TASK IN PROGRESS   Laurie Corona - 11 Nov 2016 7:58 AM     TASK EDITED  will call again 11/18   Laurie Corona - 18 Nov 2016 11:26 AM     TASK IN PROGRESS   Laurie Corona - 18 Nov 2016 11:26 AM     TASK EDITED   lm for pt with names of fertility specialists        Active Problems    1  Allergic rhinitis (477 9) (J30 9)   2   Encounter for gynecological examination without abnormal finding (V72 31) (Z01 419)   3  Infertility   4  Lumbar strain (847 2) (S39 012A)   5  Moderate persistent asthma (493 90) (J45 40)   6  Screening for STD (sexually transmitted disease) (V74 5) (Z11 3)    Current Meds   1  Albuterol Sulfate (2 5 MG/3ML) 0 083% Inhalation Nebulization Solution; Inhale 1 unit   dose twice a day and every 4-6 hours by nebulizer for shortness of   breath or wheezing; Therapy: 42PEM4616 to (03 [phone])  Requested for: 66FKB2736; Last   Rx:29Jun2016 Ordered   2  Breo Ellipta 200-25 MCG/INH Inhalation Aerosol Powder Breath Activated; INHALE 1   PUFFS Daily; Therapy: 82TRB7873 to (Evaluate:30Ans2358)  Requested for: 75Pam8330; Last   Rx:59Qka3040 Ordered   3  Cetirizine HCl - 10 MG Oral Tablet; TAKE 1 TABLET DAILY AS DIRECTED; Therapy: 31REZ1479 to (Evaluate:23Qgg1152); Last Rx:14Jun2016 Ordered   4  Ventolin  (90 Base) MCG/ACT Inhalation Aerosol Solution; INHALE 2 PUFFS   EVERY 4 HOURS AS NEEDED; Therapy: 70XSU1319 to (Evaluate:22Jun2017)  Requested for: 90YTD6995; Last   Rx:27Jun2016 Ordered    Allergies    1   Sulfa Drugs    Signatures   Electronically signed by : German Benavides, ; Nov 18 2016 11:26AM EST                       (Author)

## 2018-01-23 VITALS
BODY MASS INDEX: 40.67 KG/M2 | DIASTOLIC BLOOD PRESSURE: 80 MMHG | SYSTOLIC BLOOD PRESSURE: 130 MMHG | WEIGHT: 259.13 LBS | HEIGHT: 67 IN

## 2018-01-23 NOTE — MISCELLANEOUS
Message  Return to work or school:   Kavita Kendrick is under my professional care  She was seen in my office on 01/15/2018   Please excuse patient from leaving work early on Saturday 1/13/18  Due to current health condition  Cherie Grullon, JAUN, CRNP        Signatures   Electronically signed by : Cherie Grullon, [facility]; Jan 19 2018  2:06PM EST                       (Author)

## 2018-01-24 ENCOUNTER — OFFICE VISIT (OUTPATIENT)
Dept: OBGYN CLINIC | Facility: CLINIC | Age: 33
End: 2018-01-24

## 2018-01-24 VITALS — BODY MASS INDEX: 40.57 KG/M2 | WEIGHT: 259 LBS | DIASTOLIC BLOOD PRESSURE: 76 MMHG | SYSTOLIC BLOOD PRESSURE: 114 MMHG

## 2018-01-24 DIAGNOSIS — N91.2 AMENORRHEA: Primary | ICD-10-CM

## 2018-01-24 LAB — FET CRL US.MEAS: 1.6 MM

## 2018-01-24 RX ORDER — ALBUTEROL SULFATE 90 UG/1
2 AEROSOL, METERED RESPIRATORY (INHALATION) EVERY 4 HOURS PRN
COMMUNITY
Start: 2012-02-09 | End: 2019-11-25 | Stop reason: SDUPTHER

## 2018-01-24 RX ORDER — FLUTICASONE FUROATE AND VILANTEROL 200; 25 UG/1; UG/1
1 POWDER RESPIRATORY (INHALATION) DAILY
COMMUNITY
Start: 2016-06-14 | End: 2018-10-23 | Stop reason: ALTCHOICE

## 2018-01-25 NOTE — PROGRESS NOTES
Early OB Ultrasound Procedure Note    Technician: Study performed by the interpreting physician assistant    Indications:  Early gestation, dating and viability    Procedure Details   A gestational sac is seen containing a yolk sac and a cruz embryo  No subchorionic lucency is noted  The embryonic crown-rump length calculates to an estimated gestational age of 11 weeks, 0 days  Embryonic cardiac activity is seen at a rate of 185 b/min  Findings:  Viable, cruz intrauterine pregnancy at 8 weeks, 0 days, which is consistent with an EDC of 9/5/18 by LMP      FINAL SHI 9/5/18 by LMP

## 2018-01-25 NOTE — PATIENT INSTRUCTIONS
Pregnancy   AMBULATORY CARE:   What you need to know about pregnancy:  A normal pregnancy lasts about 40 weeks  The first trimester lasts from your last period through the 12th week of pregnancy  The second trimester lasts from the 13th week of your pregnancy through the 23rd week  The third trimester lasts from your 24th week of pregnancy until your baby is born  If you know the date of your last period, your healthcare provider can estimate your due date  You may give birth to your baby any time from 37 weeks to 2 weeks after your due date  Seek care immediately if:   · You develop a severe headache that does not go away  · You have new or increased vision changes, such as blurred or spotted vision  · You have new or increased swelling in your face or hands  · You have pain or cramping in your abdomen or low back  · You have vaginal bleeding  Contact your healthcare provider or obstetrician if:   · You have abdominal cramps, pressure, or tightening  · You have a change in vaginal discharge  · You cannot keep food or drinks down, and you are losing weight  · You have chills or a fever  · You have vaginal itching, burning, or pain  · You have yellow, green, white, or foul-smelling vaginal discharge  · You have pain or burning when you urinate, less urine than usual, or pink or bloody urine  · You have questions or concerns about your condition or care  Body changes that may occur during your pregnancy:   · Breast changes  you will experience include tenderness and tingling during the early part of your pregnancy  Your breasts will become larger  You may need to use a support bra  You may see a thin, yellow fluid, called colostrum, leak from your nipples during the second trimester  Colostrum is a liquid that changes to milk about 3 days after you give birth  · Skin changes and stretch marks  may occur during your pregnancy   You may have red marks, called stretch marks, on your skin  Stretch marks will usually fade after pregnancy  Use lotion if your skin is dry and itchy  The skin on your face, around your nipples, and below your belly button may darken  Most of the time, your skin will return to its normal color after your baby is born  · Morning sickness  is nausea and vomiting that can happen at any time of day  Avoid fatty and spicy foods  Eat small meals throughout the day instead of large meals  Irene may help to decrease nausea  Ask your healthcare provider about other ways of decreasing nausea and vomiting  · Heartburn  may be caused by changes in your hormones during pregnancy  Your growing uterus may also push your stomach upward and force stomach acid to back up into your esophagus  Eat 4 or 5 small meals each day instead of large meals  Avoid spicy foods  Avoid eating right before bedtime  · Constipation  may develop during your pregnancy  To treat constipation, eat foods high in fiber such as fiber cereals, beans, fruits, vegetables, whole-grain breads, and prune juice  Get regular exercise and drink plenty of water  Your healthcare provider may also suggest a fiber supplement to soften your bowel movements  Talk to your healthcare provider before you use any medicines to decrease constipation  · Hemorrhoids  are enlarged veins in the rectal area  They may cause pain, itching, and bright red bleeding from your rectum  To decrease your risk of hemorrhoids, prevent constipation and do not strain to have a bowel movement  If you have hemorrhoids, soak in a tub of warm water to ease discomfort  Ask your healthcare provider how you can treat hemorrhoids  · Leg cramps and swelling  may be caused by low calcium levels or the added weight of pregnancy  Raise your legs above the level of your heart to decrease swelling  During a leg cramp, stretch or massage the muscle that has the cramp  Heat may help decrease pain and muscle spasms   Apply heat on your muscle for 20 to 30 minutes every 2 hours for as many days as directed  · Back pain  may occur as your baby grows  Do not stand for long periods of time or lift heavy items  Use good posture while you stand, squat, or bend  Wear low-heeled shoes with good support  Rest may also help to relieve back pain  Ask your healthcare provider about exercises you can do to strengthen your back muscles  Stay healthy during your pregnancy:   · Eat a variety of healthy foods  Healthy foods include fruits, vegetables, whole-grain breads, low-fat dairy foods, beans, lean meats, and fish  Drink liquids as directed  Ask how much liquid to drink each day and which liquids are best for you  Limit caffeine to less than 200 milligrams each day  Limit your intake of fish to 2 servings each week  Choose fish low in mercury such as canned light tuna, shrimp, crab, salmon, cod, or tilapia  Do not  eat fish high in mercury such as swordfish, tilefish, ben mackerel, and shark  · Take prenatal vitamins as directed  Your need for certain vitamins and minerals, such as folic acid, increases during pregnancy  Prenatal vitamins provide some of the extra vitamins and minerals you need  Prenatal vitamins may also help to decrease the risk of certain birth defects  · Ask how much weight you should gain during your pregnancy  Too much or too little weight gain can be unhealthy for you and your baby  · Talk to your healthcare provider about exercise  Moderate exercise can help you stay fit  Your healthcare provider will help you plan an exercise program that is safe for you during pregnancy  · Do not smoke  If you smoke, it is never too late to quit  Smoking increases your risk of a miscarriage and other health problems during your pregnancy  Smoking can cause your baby to be born too early or weigh less at birth  Ask your healthcare provider for information if you need help quitting  · Do not drink alcohol    Alcohol passes from your body to your baby through the placenta  It can affect your baby's brain development and cause fetal alcohol syndrome (FAS)  FAS is a group of conditions that causes mental, behavior, and growth problems  · Talk to your healthcare provider before you take any medicines  Many medicines may harm your baby if you take them when you are pregnant  Do not take any medicines, vitamins, herbs, or supplements without first talking to your healthcare provider  Never use illegal or street drugs (such as marijuana or cocaine) while you are pregnant  Safety tips:   · Avoid hot tubs and saunas  Do not use a hot tub or sauna while you are pregnant, especially during your first trimester  Hot tubs and saunas may raise your baby's temperature and increase the risk of birth defects  · Avoid toxoplasmosis  This is an infection caused by eating raw meat or being around infected cat feces  It can cause birth defects, miscarriages, and other problems  Wash your hands after you touch raw meat  Make sure any meat is well-cooked before you eat it  Avoid raw eggs and unpasteurized milk  Use gloves or ask someone else to clean your cat's litter box while you are pregnant  · Ask your healthcare provider about travel  The most comfortable time to travel is during the second trimester  Ask your healthcare provider if you can travel after 36 weeks  You may not be able to travel in an airplane after 36 weeks  He may also recommend that you avoid long road trips  Follow up with your healthcare provider or obstetrician as directed:  Go to all of your prenatal visits during your pregnancy  Write down your questions so you remember to ask them during your visits  © 2017 [employer] Information is for End User's use only and may not be sold, redistributed or otherwise used for commercial purposes   All illustrations and images included in CareNotes® are the copyrighted property of A D A M , Inc  or [employer]  The above information is an  only  It is not intended as medical advice for individual conditions or treatments  Talk to your doctor, nurse or pharmacist before following any medical regimen to see if it is safe and effective for you

## 2018-01-25 NOTE — PROGRESS NOTES
Early OB Ultrasound Procedure Note    Technician: Magnus      Indications:  Early gestation, dating & viability     Procedure Details   Measurements accompanying this report are noted  A gestational sac is seen containing a yolk sac and a cruz embryo  No subchorionic lucency is noted  The embryonic crown-rump length calculates to an estimated gestational age of 11 weeks, 0 days  Embryonic cardiac activity is seen at a rate of 185  b/min      Findings:  Viable, cruz intrauterine pregnancy at 8 weeks, 0 days, with a calculated EDC of 9/5/18  FINAL SHI by LMP 9/5/18

## 2018-02-09 ENCOUNTER — INITIAL PRENATAL (OUTPATIENT)
Dept: OBGYN CLINIC | Facility: CLINIC | Age: 33
End: 2018-02-09

## 2018-02-09 VITALS — DIASTOLIC BLOOD PRESSURE: 78 MMHG | WEIGHT: 259 LBS | BODY MASS INDEX: 40.57 KG/M2 | SYSTOLIC BLOOD PRESSURE: 110 MMHG

## 2018-02-09 DIAGNOSIS — Z34.01 ENCOUNTER FOR SUPERVISION OF NORMAL FIRST PREGNANCY IN FIRST TRIMESTER: Primary | ICD-10-CM

## 2018-02-09 PROCEDURE — OBC: Performed by: PHYSICIAN ASSISTANT

## 2018-02-09 NOTE — PATIENT INSTRUCTIONS
First Trimester Pregnancy   WHAT YOU NEED TO KNOW:   The first trimester of pregnancy lasts from your last period through the 13th week of pregnancy  Follow up with your healthcare provider for prenatal care  Regular prenatal care can help to keep you and your baby healthy  DISCHARGE INSTRUCTIONS:   Return to the emergency department if:   · You have pain or cramping in your abdomen or low back  · You have severe vaginal bleeding or clotting  Contact your healthcare provider or obstetrician if:   · You have light bleeding  · You have chills or a fever  · You have vaginal itching, burning, or pain  · You have yellow, green, white, or foul-smelling vaginal discharge  · You have pain or burning when you urinate, less urine than usual, or pink or bloody urine  · You have questions or concerns about your condition or care  Follow up with your healthcare provider or obstetrician as directed:  Go to all of your prenatal visits during your pregnancy  Write down your questions so you remember to ask them during your visits  Stay healthy during pregnancy:   · Take prenatal vitamins as directed  Prenatal vitamins can help you get the amount of vitamins and minerals you need during pregnancy  Prenatal vitamins may also decrease the risk of certain birth defects  · Eat a variety of healthy foods  Healthy foods include fruits, vegetables, whole-grain breads, low-fat dairy products, beans, turkey and chicken, and lean red meat  Ask your healthcare provider for more information about foods that are healthy and safe to eat during pregnancy  · Drink liquids as directed  Ask how much liquid to drink each day and which liquids are best for you  Some healthy liquids include milk, water, and juice  It is not clear how caffeine affects pregnancy  Limit your intake of caffeine to less than 200 mg each day to avoid possible health problems   Caffeine may be found in coffee, tea, cola, sports drinks, and chocolate  Do not drink alcohol  · Talk to your healthcare provider before you take medicines  Many medicines can harm your baby, especially during early pregnancy  Ask your healthcare provider before you take any medicines, including over-the-counter medicines, vitamins, herbs, or food supplements  Never use illegal or street drugs while you are pregnant  Talk to your healthcare provider if you are having trouble quitting street drugs  · Exercise  Ask your healthcare provider about the best exercise plan for you  Exercise may help you feel better and make your labor and delivery easier  · Do not smoke  Smoking can cause problems during pregnancy  It can also cause your baby to weigh less at birth  If you smoke, it is never too late to quit  Ask for information if you need help quitting  Safety tips:   · Do not use a hot tub or sauna  while you are pregnant, especially during your first trimester  Hot tubs and saunas may raise your baby's temperature and increase the risk of birth defects  It also increases your risk of miscarriage  · Protect yourself from illness  Toxoplasmosis is a disease that can cause birth defects  To protect yourself from this disease, do not clean your cat's litter box yourself  Ask someone else to clean your cat's litter box while you are pregnant  Also, do not  eat raw meat or unwashed fruits and vegetables  Wash your hands after you touch raw meat, and eat only well-cooked meat  Wash fruits and vegetables well before you eat them  © 2017 [employer] Information is for End User's use only and may not be sold, redistributed or otherwise used for commercial purposes  All illustrations and images included in CareNotes® are the copyrighted property of A D A M , Inc  or Michael Waller  The above information is an  only  It is not intended as medical advice for individual conditions or treatments   Talk to your doctor, nurse or pharmacist before following any medical regimen to see if it is safe and effective for you

## 2018-02-09 NOTE — PROGRESS NOTES
Patient here today with spouse for prenatal intake  Planned and welcomed pregnancy  Has very supportive family  Patient oriented to office and expected management  Pt  Is RN in hospital  Received Flu vaccine this season  Declines CF testing today  Sequential Screen scheduled for 2/22/18  Routine prenatal labs ordered and  PN visit scheduled  Patient has not had dental exam within the past 6 months  Encouraged dental visit  First trimester education provided and benefits of breastfeeding reviewed  No further questions or concerns at this time

## 2018-02-14 PROBLEM — N92.6 IRREGULAR MENSTRUAL CYCLE: Status: ACTIVE | Noted: 2017-02-03

## 2018-02-14 PROBLEM — R11.2 NAUSEA AND VOMITING: Status: ACTIVE | Noted: 2017-05-03

## 2018-02-14 PROBLEM — O20.9 VAGINAL BLEEDING IN PREGNANCY, FIRST TRIMESTER: Status: ACTIVE | Noted: 2018-01-16

## 2018-02-14 PROBLEM — IMO0002 INFERTILITY: Status: ACTIVE | Noted: 2017-02-03

## 2018-02-21 ENCOUNTER — ROUTINE PRENATAL (OUTPATIENT)
Dept: OBGYN CLINIC | Facility: CLINIC | Age: 33
End: 2018-02-21

## 2018-02-21 VITALS — BODY MASS INDEX: 40.06 KG/M2 | WEIGHT: 255.8 LBS | SYSTOLIC BLOOD PRESSURE: 122 MMHG | DIASTOLIC BLOOD PRESSURE: 82 MMHG

## 2018-02-21 DIAGNOSIS — Z34.91 FIRST TRIMESTER PREGNANCY: ICD-10-CM

## 2018-02-21 DIAGNOSIS — R63.8 INCREASED BODY MASS INDEX (BMI): Primary | ICD-10-CM

## 2018-02-21 DIAGNOSIS — Z34.01 ENCOUNTER FOR SUPERVISION OF NORMAL FIRST PREGNANCY IN FIRST TRIMESTER: Primary | ICD-10-CM

## 2018-02-21 PROBLEM — IMO0002 INFERTILITY: Status: RESOLVED | Noted: 2017-02-03 | Resolved: 2018-02-21

## 2018-02-21 PROCEDURE — 87591 N.GONORRHOEAE DNA AMP PROB: CPT | Performed by: OBSTETRICS & GYNECOLOGY

## 2018-02-21 PROCEDURE — PNV: Performed by: OBSTETRICS & GYNECOLOGY

## 2018-02-21 PROCEDURE — 87491 CHLMYD TRACH DNA AMP PROBE: CPT | Performed by: OBSTETRICS & GYNECOLOGY

## 2018-02-21 NOTE — PROGRESS NOTES
Kalyani Burton is here for her first prenatal visit  Age: 33 y o  LMP: Patient's last menstrual period was 2017 (exact date)  Gestational age 13w0d based on LMP Yes , early US No   1  Para 0         Previous C Section: No  She admits to nausea and vomiting  She has had vaginal bleeding  Patients has no complaints  She  is planning consultation with maternal fetal medicine for a sequential screen and genetic screening  Allergies: Sulfa antibiotics  Medication use :   Current Outpatient Prescriptions   Medication Sig Dispense Refill    albuterol (VENTOLIN HFA) 90 mcg/act inhaler Inhale 2 puffs      fluticasone furoate-vilanterol (BREO ELLIPTA) 200-25 MCG/INH inhaler Inhale daily      Prenatal Multivit-Min-Fe-FA (PRENATAL VITAMINS PO) Take by mouth       No current facility-administered medications for this visit  She is a non-smoker  In this pregnancy her  medical history is significant for BMI of 39 9, first trimester bleeding    Her obstetrical, medical, surgical and family history was reviewed  Her physical exam was normal  A Pap smear was not obtained, Gonorrhea and Chlamydia testing was obtained    Discussed as well during this visit was diet, prenatal vitamins, prenatal visits, lab testing, breast feeding, vaccinations, maternal fetal medicine consultations, prevention of exposure to infectious diseases and toxic chemicals, lifestyle      Risk factors for this pregnancy include: elevated BMI

## 2018-02-22 ENCOUNTER — ROUTINE PRENATAL (OUTPATIENT)
Dept: PERINATAL CARE | Facility: CLINIC | Age: 33
End: 2018-02-22
Payer: COMMERCIAL

## 2018-02-22 VITALS
BODY MASS INDEX: 40.56 KG/M2 | HEIGHT: 67 IN | HEART RATE: 77 BPM | WEIGHT: 258.4 LBS | DIASTOLIC BLOOD PRESSURE: 74 MMHG | SYSTOLIC BLOOD PRESSURE: 136 MMHG

## 2018-02-22 DIAGNOSIS — Z3A.12 12 WEEKS GESTATION OF PREGNANCY: ICD-10-CM

## 2018-02-22 DIAGNOSIS — O99.211 MATERNAL MORBID OBESITY IN FIRST TRIMESTER, ANTEPARTUM (HCC): ICD-10-CM

## 2018-02-22 DIAGNOSIS — Z34.01 ENCOUNTER FOR SUPERVISION OF NORMAL FIRST PREGNANCY IN FIRST TRIMESTER: ICD-10-CM

## 2018-02-22 DIAGNOSIS — E66.01 MATERNAL MORBID OBESITY IN FIRST TRIMESTER, ANTEPARTUM (HCC): ICD-10-CM

## 2018-02-22 PROBLEM — R11.2 NAUSEA AND VOMITING: Status: RESOLVED | Noted: 2017-05-03 | Resolved: 2018-02-22

## 2018-02-22 PROBLEM — O20.9 VAGINAL BLEEDING IN PREGNANCY, FIRST TRIMESTER: Status: RESOLVED | Noted: 2018-01-16 | Resolved: 2018-02-22

## 2018-02-22 PROBLEM — N91.2 AMENORRHEA: Status: RESOLVED | Noted: 2018-01-24 | Resolved: 2018-02-22

## 2018-02-22 PROBLEM — N92.6 IRREGULAR MENSTRUAL CYCLE: Status: RESOLVED | Noted: 2017-02-03 | Resolved: 2018-02-22

## 2018-02-22 PROCEDURE — 76813 OB US NUCHAL MEAS 1 GEST: CPT | Performed by: OBSTETRICS & GYNECOLOGY

## 2018-02-22 PROCEDURE — 76801 OB US < 14 WKS SINGLE FETUS: CPT | Performed by: OBSTETRICS & GYNECOLOGY

## 2018-02-23 LAB
CHLAMYDIA DNA CVX QL NAA+PROBE: NORMAL
N GONORRHOEA DNA GENITAL QL NAA+PROBE: NORMAL

## 2018-02-27 ENCOUNTER — TELEPHONE (OUTPATIENT)
Dept: OBGYN CLINIC | Facility: MEDICAL CENTER | Age: 33
End: 2018-02-27

## 2018-02-27 ENCOUNTER — APPOINTMENT (OUTPATIENT)
Dept: LAB | Facility: HOSPITAL | Age: 33
End: 2018-02-27
Payer: COMMERCIAL

## 2018-02-27 DIAGNOSIS — Z34.01 ENCOUNTER FOR SUPERVISION OF NORMAL FIRST PREGNANCY IN FIRST TRIMESTER: ICD-10-CM

## 2018-02-27 DIAGNOSIS — R63.8 INCREASED BODY MASS INDEX (BMI): ICD-10-CM

## 2018-02-27 DIAGNOSIS — O99.810 ABNORMAL GLUCOSE AFFECTING PREGNANCY: Primary | ICD-10-CM

## 2018-02-27 PROBLEM — O24.419 GESTATIONAL DIABETES: Status: ACTIVE | Noted: 2018-02-27

## 2018-02-27 LAB
ABO GROUP BLD: NORMAL
BACTERIA UR QL AUTO: ABNORMAL /HPF
BASOPHILS # BLD AUTO: 0.01 THOUSANDS/ΜL (ref 0–0.1)
BASOPHILS NFR BLD AUTO: 0 % (ref 0–1)
BILIRUB UR QL STRIP: NEGATIVE
BLD GP AB SCN SERPL QL: NEGATIVE
CLARITY UR: CLEAR
COLOR UR: YELLOW
EOSINOPHIL # BLD AUTO: 0.1 THOUSAND/ΜL (ref 0–0.61)
EOSINOPHIL NFR BLD AUTO: 2 % (ref 0–6)
ERYTHROCYTE [DISTWIDTH] IN BLOOD BY AUTOMATED COUNT: 12.9 % (ref 11.6–15.1)
EXTERNAL HIV-1 ANTIBODY: NONREACTIVE
GLUCOSE 1H P 50 G GLC PO SERPL-MCNC: 181 MG/DL
GLUCOSE UR STRIP-MCNC: NEGATIVE MG/DL
HCT VFR BLD AUTO: 36.4 % (ref 34.8–46.1)
HGB BLD-MCNC: 12.5 G/DL (ref 11.5–15.4)
HGB UR QL STRIP.AUTO: NEGATIVE
KETONES UR STRIP-MCNC: NEGATIVE MG/DL
LEUKOCYTE ESTERASE UR QL STRIP: NEGATIVE
LYMPHOCYTES # BLD AUTO: 0.97 THOUSANDS/ΜL (ref 0.6–4.47)
LYMPHOCYTES NFR BLD AUTO: 14 % (ref 14–44)
MCH RBC QN AUTO: 29.9 PG (ref 26.8–34.3)
MCHC RBC AUTO-ENTMCNC: 34.3 G/DL (ref 31.4–37.4)
MCV RBC AUTO: 87 FL (ref 82–98)
MONOCYTES # BLD AUTO: 0.16 THOUSAND/ΜL (ref 0.17–1.22)
MONOCYTES NFR BLD AUTO: 2 % (ref 4–12)
NEUTROPHILS # BLD AUTO: 5.51 THOUSANDS/ΜL (ref 1.85–7.62)
NEUTS SEG NFR BLD AUTO: 82 % (ref 43–75)
NITRITE UR QL STRIP: NEGATIVE
NON-SQ EPI CELLS URNS QL MICRO: ABNORMAL /HPF
NRBC BLD AUTO-RTO: 0 /100 WBCS
PH UR STRIP.AUTO: 6.5 [PH] (ref 4.5–8)
PLATELET # BLD AUTO: 199 THOUSANDS/UL (ref 149–390)
PMV BLD AUTO: 9.9 FL (ref 8.9–12.7)
PROT UR STRIP-MCNC: NEGATIVE MG/DL
RBC # BLD AUTO: 4.18 MILLION/UL (ref 3.81–5.12)
RBC #/AREA URNS AUTO: ABNORMAL /HPF
RH BLD: POSITIVE
RUBV IGG SERPL IA-ACNC: >175 IU/ML
SP GR UR STRIP.AUTO: 1.02 (ref 1–1.03)
SPECIMEN EXPIRATION DATE: NORMAL
UROBILINOGEN UR QL STRIP.AUTO: 0.2 E.U./DL
WBC # BLD AUTO: 6.75 THOUSAND/UL (ref 4.31–10.16)
WBC #/AREA URNS AUTO: ABNORMAL /HPF

## 2018-02-27 PROCEDURE — 87086 URINE CULTURE/COLONY COUNT: CPT

## 2018-02-27 PROCEDURE — 81001 URINALYSIS AUTO W/SCOPE: CPT

## 2018-02-27 PROCEDURE — 80081 OBSTETRIC PANEL INC HIV TSTG: CPT

## 2018-02-27 PROCEDURE — 36415 COLL VENOUS BLD VENIPUNCTURE: CPT

## 2018-02-27 PROCEDURE — 82950 GLUCOSE TEST: CPT

## 2018-02-28 LAB
HBV SURFACE AG SER QL: NORMAL
HIV 1+2 AB+HIV1 P24 AG SERPL QL IA: NORMAL
RPR SER QL: NORMAL

## 2018-03-01 ENCOUNTER — TELEPHONE (OUTPATIENT)
Dept: PERINATAL CARE | Facility: CLINIC | Age: 33
End: 2018-03-01

## 2018-03-01 ENCOUNTER — TELEPHONE (OUTPATIENT)
Dept: OBGYN CLINIC | Facility: CLINIC | Age: 33
End: 2018-03-01

## 2018-03-01 DIAGNOSIS — R73.09 ELEVATED GLUCOSE: ICD-10-CM

## 2018-03-01 DIAGNOSIS — R73.09 ELEVATED GLUCOSE LEVEL: Primary | ICD-10-CM

## 2018-03-01 LAB — BACTERIA UR CULT: NORMAL

## 2018-03-01 NOTE — TELEPHONE ENCOUNTER
Pt called office stating, she" spoke with patricia -diabetic educator, & explained to her that pt ate breakfast 20 min prior to her test & feels it altered the results & would like to repeat one hour glucose test "   One hour glucola reordered in [facility]  Pt to have testing done tomorrow am   Will call pt with results on Monday  3/5/18

## 2018-03-02 ENCOUNTER — APPOINTMENT (OUTPATIENT)
Dept: LAB | Facility: HOSPITAL | Age: 33
End: 2018-03-02
Attending: OBSTETRICS & GYNECOLOGY
Payer: COMMERCIAL

## 2018-03-02 DIAGNOSIS — R73.09 ELEVATED GLUCOSE LEVEL: ICD-10-CM

## 2018-03-02 LAB — GLUCOSE 1H P 50 G GLC PO SERPL-MCNC: 145 MG/DL

## 2018-03-02 PROCEDURE — 36415 COLL VENOUS BLD VENIPUNCTURE: CPT

## 2018-03-02 PROCEDURE — 82950 GLUCOSE TEST: CPT

## 2018-03-02 NOTE — TELEPHONE ENCOUNTER
Called pt to inform of repeated  Elevated one hour glucola result  - L/M for pt to return call to office     3 hr GTT ordered in epic (one hour is 145)

## 2018-03-05 NOTE — TELEPHONE ENCOUNTER
Patient is aware of glucose results and that she needs to do the 3hr GTT  Advised it is done at [place], [place] or the [place] campus  The order is in [facility] [address]  She will be going on Thursday or Friday

## 2018-03-06 ENCOUNTER — TELEPHONE (OUTPATIENT)
Dept: PERINATAL CARE | Facility: CLINIC | Age: 33
End: 2018-03-06

## 2018-03-09 ENCOUNTER — APPOINTMENT (OUTPATIENT)
Dept: LAB | Facility: HOSPITAL | Age: 33
End: 2018-03-09
Attending: OBSTETRICS & GYNECOLOGY
Payer: COMMERCIAL

## 2018-03-09 ENCOUNTER — TRANSCRIBE ORDERS (OUTPATIENT)
Dept: LAB | Facility: HOSPITAL | Age: 33
End: 2018-03-09

## 2018-03-09 DIAGNOSIS — R73.09 ELEVATED GLUCOSE LEVEL: ICD-10-CM

## 2018-03-09 LAB
GLUCOSE 1H P 100 G GLC PO SERPL-MCNC: 168 MG/DL (ref 65–179)
GLUCOSE 2H P 100 G GLC PO SERPL-MCNC: 111 MG/DL (ref 65–154)
GLUCOSE 3H P 100 G GLC PO SERPL-MCNC: 83 MG/DL (ref 65–139)
GLUCOSE P FAST SERPL-MCNC: 95 MG/DL (ref 65–99)

## 2018-03-09 PROCEDURE — 36415 COLL VENOUS BLD VENIPUNCTURE: CPT

## 2018-03-09 PROCEDURE — 82951 GLUCOSE TOLERANCE TEST (GTT): CPT

## 2018-03-09 PROCEDURE — 82952 GTT-ADDED SAMPLES: CPT

## 2018-03-22 ENCOUNTER — ROUTINE PRENATAL (OUTPATIENT)
Dept: OBGYN CLINIC | Facility: CLINIC | Age: 33
End: 2018-03-22

## 2018-03-22 VITALS — SYSTOLIC BLOOD PRESSURE: 108 MMHG | BODY MASS INDEX: 40.69 KG/M2 | DIASTOLIC BLOOD PRESSURE: 72 MMHG | WEIGHT: 259.8 LBS

## 2018-03-22 DIAGNOSIS — Z3A.16 16 WEEKS GESTATION OF PREGNANCY: ICD-10-CM

## 2018-03-22 DIAGNOSIS — Z34.92 SECOND TRIMESTER PREGNANCY: Primary | ICD-10-CM

## 2018-03-22 DIAGNOSIS — Z34.02 ENCOUNTER FOR SUPERVISION OF NORMAL FIRST PREGNANCY IN SECOND TRIMESTER: ICD-10-CM

## 2018-03-22 PROCEDURE — PNV: Performed by: OBSTETRICS & GYNECOLOGY

## 2018-03-28 ENCOUNTER — TRANSCRIBE ORDERS (OUTPATIENT)
Dept: ADMINISTRATIVE | Facility: HOSPITAL | Age: 33
End: 2018-03-28

## 2018-03-28 ENCOUNTER — APPOINTMENT (OUTPATIENT)
Dept: LAB | Facility: HOSPITAL | Age: 33
End: 2018-03-28
Attending: OBSTETRICS & GYNECOLOGY
Payer: COMMERCIAL

## 2018-03-28 DIAGNOSIS — Z34.92 SECOND TRIMESTER PREGNANCY: Primary | ICD-10-CM

## 2018-03-28 DIAGNOSIS — Z34.92 SECOND TRIMESTER PREGNANCY: ICD-10-CM

## 2018-03-28 PROCEDURE — 36415 COLL VENOUS BLD VENIPUNCTURE: CPT

## 2018-03-29 LAB — SCAN RESULT: NORMAL

## 2018-04-06 ENCOUNTER — TELEPHONE (OUTPATIENT)
Dept: PERINATAL CARE | Facility: CLINIC | Age: 33
End: 2018-04-06

## 2018-04-06 DIAGNOSIS — Z3A.12 12 WEEKS GESTATION OF PREGNANCY: ICD-10-CM

## 2018-04-06 DIAGNOSIS — O99.211 MATERNAL MORBID OBESITY IN FIRST TRIMESTER, ANTEPARTUM (HCC): ICD-10-CM

## 2018-04-06 DIAGNOSIS — E66.01 MATERNAL MORBID OBESITY IN FIRST TRIMESTER, ANTEPARTUM (HCC): ICD-10-CM

## 2018-04-19 ENCOUNTER — ROUTINE PRENATAL (OUTPATIENT)
Dept: OBGYN CLINIC | Facility: CLINIC | Age: 33
End: 2018-04-19

## 2018-04-19 VITALS — BODY MASS INDEX: 41.79 KG/M2 | WEIGHT: 266.8 LBS | SYSTOLIC BLOOD PRESSURE: 101 MMHG | DIASTOLIC BLOOD PRESSURE: 70 MMHG

## 2018-04-19 DIAGNOSIS — Z34.02 ENCOUNTER FOR SUPERVISION OF NORMAL FIRST PREGNANCY IN SECOND TRIMESTER: Primary | ICD-10-CM

## 2018-04-19 DIAGNOSIS — E66.01 MATERNAL MORBID OBESITY IN FIRST TRIMESTER, ANTEPARTUM (HCC): ICD-10-CM

## 2018-04-19 DIAGNOSIS — O99.211 MATERNAL MORBID OBESITY IN FIRST TRIMESTER, ANTEPARTUM (HCC): ICD-10-CM

## 2018-04-19 PROCEDURE — PNV: Performed by: OBSTETRICS & GYNECOLOGY

## 2018-04-19 NOTE — PROGRESS NOTES
Problem List Items Addressed This Visit        Other    Maternal morbid obesity in first trimester, antepartum ([address] )    Encounter for supervision of normal first pregnancy in second trimester - Primary     Had elevated 1 hour glucola, would prefer to just repeat 3h GTT with 28 week labs  Has level II US scheduled next week  Had scan at [facility] - it's a boy! Birth plan returned - hopes to do without epidural, but is completely open to one if needed  Encouraged birthing classes  Plans to breastfeed  Headaches have completely resolved

## 2018-04-19 NOTE — ASSESSMENT & PLAN NOTE
Had elevated 1 hour glucola, would prefer to just repeat 3h GTT with 28 week labs  Has level II US scheduled next week  Had scan at [facility] - it's a boy! Birth plan returned - hopes to do without epidural, but is completely open to one if needed  Encouraged birthing classes  Plans to breastfeed  Headaches have completely resolved

## 2018-04-27 ENCOUNTER — ROUTINE PRENATAL (OUTPATIENT)
Dept: PERINATAL CARE | Facility: CLINIC | Age: 33
End: 2018-04-27
Payer: COMMERCIAL

## 2018-04-27 VITALS
BODY MASS INDEX: 40.97 KG/M2 | SYSTOLIC BLOOD PRESSURE: 118 MMHG | HEIGHT: 67 IN | WEIGHT: 261 LBS | HEART RATE: 81 BPM | DIASTOLIC BLOOD PRESSURE: 81 MMHG

## 2018-04-27 DIAGNOSIS — Z36.86 ENCOUNTER FOR ANTENATAL SCREENING FOR CERVICAL LENGTH: Primary | ICD-10-CM

## 2018-04-27 DIAGNOSIS — Z3A.21 21 WEEKS GESTATION OF PREGNANCY: ICD-10-CM

## 2018-04-27 DIAGNOSIS — O99.810 ABNORMAL GLUCOSE AFFECTING PREGNANCY: ICD-10-CM

## 2018-04-27 DIAGNOSIS — O99.212 MATERNAL MORBID OBESITY, ANTEPARTUM, SECOND TRIMESTER (HCC): ICD-10-CM

## 2018-04-27 DIAGNOSIS — E66.01 MATERNAL MORBID OBESITY, ANTEPARTUM, SECOND TRIMESTER (HCC): ICD-10-CM

## 2018-04-27 PROCEDURE — 76811 OB US DETAILED SNGL FETUS: CPT | Performed by: OBSTETRICS & GYNECOLOGY

## 2018-04-27 PROCEDURE — 76817 TRANSVAGINAL US OBSTETRIC: CPT | Performed by: OBSTETRICS & GYNECOLOGY

## 2018-04-27 NOTE — PROGRESS NOTES
Normal growth and fluid seen  Recommend a growth scan at 32 weeks  along with review of the missed anatomy of the ductus, septum and placental cord insertion  Patients with an increased BMI of 40 or more have an increased risk for stillbirth at term  Recommend weekly NST / fluid scans from 36 weeks on  Her 3hr ogtt had a fbs of 95  If she has persistent FBS 95 or above then she may have GDM  Recommended diabetes education to  educate her on the use of a glucometer     Randall Jones MD

## 2018-05-01 ENCOUNTER — TELEPHONE (OUTPATIENT)
Dept: PERINATAL CARE | Facility: CLINIC | Age: 33
End: 2018-05-01

## 2018-05-01 NOTE — TELEPHONE ENCOUNTER
----- Message from Brandt Driscoll MD sent at 4/27/2018  3:35 PM EDT -----  Please set up for glucometer teaching for a fbs of 95    Brandt Driscoll MD

## 2018-05-09 ENCOUNTER — OFFICE VISIT (OUTPATIENT)
Dept: PERINATAL CARE | Facility: CLINIC | Age: 33
End: 2018-05-09
Payer: COMMERCIAL

## 2018-05-09 VITALS
WEIGHT: 260 LBS | DIASTOLIC BLOOD PRESSURE: 80 MMHG | HEART RATE: 94 BPM | SYSTOLIC BLOOD PRESSURE: 120 MMHG | BODY MASS INDEX: 40.81 KG/M2 | HEIGHT: 67 IN

## 2018-05-09 DIAGNOSIS — E66.01 MATERNAL MORBID OBESITY, ANTEPARTUM, SECOND TRIMESTER (HCC): ICD-10-CM

## 2018-05-09 DIAGNOSIS — Z3A.21 21 WEEKS GESTATION OF PREGNANCY: ICD-10-CM

## 2018-05-09 DIAGNOSIS — O24.410 DIET CONTROLLED GESTATIONAL DIABETES MELLITUS (GDM) IN SECOND TRIMESTER: Primary | ICD-10-CM

## 2018-05-09 DIAGNOSIS — Z34.02 ENCOUNTER FOR SUPERVISION OF NORMAL FIRST PREGNANCY IN SECOND TRIMESTER: ICD-10-CM

## 2018-05-09 DIAGNOSIS — O99.212 MATERNAL MORBID OBESITY, ANTEPARTUM, SECOND TRIMESTER (HCC): ICD-10-CM

## 2018-05-09 DIAGNOSIS — O99.810 ABNORMAL GLUCOSE AFFECTING PREGNANCY: ICD-10-CM

## 2018-05-09 PROCEDURE — G0108 DIAB MANAGE TRN  PER INDIV: HCPCS | Performed by: DIETITIAN, REGISTERED

## 2018-05-09 NOTE — PROGRESS NOTES
Date:  18  RE: Madeline Jimenez    : 1985  SHI: Estimated Date of Delivery: 18  EGA: 23w0d             Dear Pierce Elise: Thank you for referring your patient to the Diabetes and Pregnancy Program at [address]  The patient attended Class 1 received the following education:     Pathophysiology of diabetes and pregnancy  This includes maternal-fetal complications such as fetal macrosomia,  hypoglycemia, polyhydramnios, increased incidence of  section, pre-term labor and in severe cases, fetal demise and stillbirth   Self-monitoring of blood glucose levels: fasting (goal 60mg/dl to 90mg/dl) and two hours after the start of the meal less (goal less than 120mg/dl)  The patient was provided with a One-Touch Verio blood glucose meter and supplies   Medical Nutrition Therapy for diabetes and pregnancy  The patient was provided with a 2400 calorie gestational diabetes meal plan and the following was reviewed:     o Basic review of macronutrients   o Meal pattern should consist of three small meals and three snacks daily  o Carbohydrate gram amounts per meal   o Instructions on how to read a food label  o Appropriate serving sizes for carbohydrates and proteins  o Incorporating protein at each meal and snack  o Maintain a three day food diary and bring to class 2    Report blood glucose levels to the [employer] [address] weekly or as directed:  o Phone : [phone]  If no response in 24 hours, call [phone]   o Fax: [phone]  o Email: [email or web address]  The patient is scheduled to attend class 2 on Friday, 18  Additionally, fetal ultrasound evaluation by the Perinatologist has been scheduled to assure continuity of care  Please contact the Diabetes and Pregnancy Program at [phone] if you have any questions    Time spent with patient 9:15-10:15 AM; time spent face to face counseling greater than 50% of the appointment      Sincerely,   [facility]  Diabetes Educator   Diabetes and Pregnancy Program

## 2018-05-10 RX ORDER — LANCETS 33 GAUGE
EACH MISCELLANEOUS
Qty: 100 EACH | Refills: 3 | Status: SHIPPED | OUTPATIENT
Start: 2018-05-10 | End: 2018-09-15

## 2018-05-10 RX ORDER — BLOOD SUGAR DIAGNOSTIC
STRIP MISCELLANEOUS
Qty: 150 EACH | Refills: 3 | Status: SHIPPED | OUTPATIENT
Start: 2018-05-10 | End: 2018-09-01 | Stop reason: HOSPADM

## 2018-05-14 ENCOUNTER — ROUTINE PRENATAL (OUTPATIENT)
Dept: OBGYN CLINIC | Facility: CLINIC | Age: 33
End: 2018-05-14

## 2018-05-14 VITALS — DIASTOLIC BLOOD PRESSURE: 74 MMHG | WEIGHT: 260.8 LBS | SYSTOLIC BLOOD PRESSURE: 114 MMHG | BODY MASS INDEX: 40.85 KG/M2

## 2018-05-14 DIAGNOSIS — Z34.02 PREGNANCY, FIRST, OBSTETRICAL CARE, SECOND TRIMESTER: ICD-10-CM

## 2018-05-14 DIAGNOSIS — Z3A.21 21 WEEKS GESTATION OF PREGNANCY: ICD-10-CM

## 2018-05-14 DIAGNOSIS — Z34.02 ENCOUNTER FOR SUPERVISION OF NORMAL FIRST PREGNANCY IN SECOND TRIMESTER: ICD-10-CM

## 2018-05-14 DIAGNOSIS — Z34.92 SECOND TRIMESTER PREGNANCY: Primary | ICD-10-CM

## 2018-05-14 DIAGNOSIS — O99.212 MATERNAL MORBID OBESITY, ANTEPARTUM, SECOND TRIMESTER (HCC): ICD-10-CM

## 2018-05-14 DIAGNOSIS — E66.01 MATERNAL MORBID OBESITY, ANTEPARTUM, SECOND TRIMESTER (HCC): ICD-10-CM

## 2018-05-14 PROBLEM — O24.419 GESTATIONAL DIABETES MELLITUS (GDM): Status: ACTIVE | Noted: 2018-05-14

## 2018-05-14 PROCEDURE — PNV: Performed by: OBSTETRICS & GYNECOLOGY

## 2018-05-14 NOTE — PROGRESS NOTES
Patient was concerned whether or not she truly has gestational diabetes   her 3 hour GTT showed a fasting level of 95   patient doing glucometer testing at home   at times fasting levels do indeed reach the  level of 95,  The postprandial levels are all was below 120   I told patient that based on her results she should be the labeled as having gestational diabetes   patient feels well has no other problems

## 2018-05-14 NOTE — PROGRESS NOTES
Patient is checking her sugar 3x daily and sending it in to [facility]  She works until [employer] and questions if she can have another 3HR test to see if she really is Diabetic  CBC and RPR given to patient

## 2018-05-18 ENCOUNTER — TELEPHONE (OUTPATIENT)
Dept: PERINATAL CARE | Facility: CLINIC | Age: 33
End: 2018-05-18

## 2018-05-18 NOTE — TELEPHONE ENCOUNTER
Date:  18  RE: Alyssia Jacques    : 1985  Estimated Date of Delivery: 18  EGA: 24w2d  OB/GYN: Ajay Frye      Date Fasting Post-  breakfast Post-  lunch Post-  dinner Before bedtime Carbs Comments   5-9  95 144 99      5-10 87 114 106 112      5-11 92 118 111 98      5-12 96 101 114 110      5-13 101 NR       5-14 89 114 108 104      5-15 97 101 115 114      5-16 91 85 117 101      5-17 92 136     out to breakfast       Current regimen:  2400 diabetes and pregnancy diet with 3 meals/snacks including protein  SMBG fasting and 2 hours after meals with One Touch Verio meter  Plan:  Continue testing and diet with 2-3 servings and no carbohydrate foods for bedtime snack  Advised may need to add medication to get FBS into target range consistently      Date due to report next:  Tuesday, 18    Bruno Morris RN  Diabetes Educator   [facility]

## 2018-05-22 ENCOUNTER — TELEPHONE (OUTPATIENT)
Dept: PERINATAL CARE | Facility: CLINIC | Age: 33
End: 2018-05-22

## 2018-05-22 NOTE — TELEPHONE ENCOUNTER
Date:  18  RE: Silver Cornejo    : 1985  Estimated Date of Delivery: 18  EGA: 24w6d  OB/GYN: Mellisa Levin      Date Fasting Post-  breakfast Post-  lunch Post-  dinner Before bedtime Carbs Comments   -17   121 99      -18 93 99 118 98      - 87 101 116 118      - 86 112 96 121       78 102 93 104       81 117                      Current regimen:  2400 diabetes and pregnancy diet with 3 meals/snacks including protein with protein only hs snack  SMBG fasting and 2 hours after meals with One Touch Verio meter      Plan:  Continue current regimen      Date due to report next:  Tuesday, 18    Gómez Murillo RN  Diabetes Educator   [facility]

## 2018-05-25 ENCOUNTER — OFFICE VISIT (OUTPATIENT)
Dept: PERINATAL CARE | Facility: CLINIC | Age: 33
End: 2018-05-25
Payer: COMMERCIAL

## 2018-05-25 VITALS
HEART RATE: 88 BPM | BODY MASS INDEX: 40.65 KG/M2 | DIASTOLIC BLOOD PRESSURE: 80 MMHG | HEIGHT: 67 IN | WEIGHT: 259 LBS | SYSTOLIC BLOOD PRESSURE: 118 MMHG

## 2018-05-25 DIAGNOSIS — O99.212 MATERNAL MORBID OBESITY, ANTEPARTUM, SECOND TRIMESTER (HCC): ICD-10-CM

## 2018-05-25 DIAGNOSIS — E66.01 MATERNAL MORBID OBESITY, ANTEPARTUM, SECOND TRIMESTER (HCC): ICD-10-CM

## 2018-05-25 DIAGNOSIS — Z34.02 ENCOUNTER FOR SUPERVISION OF NORMAL FIRST PREGNANCY IN SECOND TRIMESTER: ICD-10-CM

## 2018-05-25 DIAGNOSIS — Z3A.21 21 WEEKS GESTATION OF PREGNANCY: ICD-10-CM

## 2018-05-25 DIAGNOSIS — O24.410 DIET CONTROLLED GESTATIONAL DIABETES MELLITUS (GDM) IN SECOND TRIMESTER: Primary | ICD-10-CM

## 2018-05-25 PROCEDURE — G0108 DIAB MANAGE TRN  PER INDIV: HCPCS | Performed by: DIETITIAN, REGISTERED

## 2018-05-25 NOTE — PROGRESS NOTES
DATE:  18  RE: Glenn Childs    : 1985    SHI: Estimated Date of Delivery: 18    EGA: 96Y5P    Dear Justine Waddell: Thank you for referring your patient to the [facility] at [address]  The patient attended Class 2 received the following education:    Weight gain during in pregnancy  Based on the patients height of 5' 7" (1 702 m) inches, pre-pregnancy weight of 116 kg (255 lb) pounds 39 9, we would recommend a total weight gain of 11-20 pounds for the pregnancy   The patients current weight is 117 kg (259 lb) pounds, and her weight gain to date is 4 pounds   Medical Nutrition Therapy for diabetes and pregnancy  Th patient was instructed on the following:  o Individualized meal plan    o Use of food diary to maintain a meal plan    o Importance of protein as it relates to blood glucose control   Review of blood glucose log  Reinforcement of blood glucose goals and reporting guidelines   Ultrasounds every four weeks in the [facility] to evaluate fetal growth   Exercise Guidelines:   o Walking up to thirty minutes daily can reduce blood glucose levels  o Monitor for greater than four contractions per hour     o The patient has been instructed not to begin physical activity if she has been instructed not to exercise by your office   Sick day guidelines and hypoglycemia with treatment   Post-partum guidelines:  o Completion of a 75 gram glucose tolerance test at 6 weeks post-partum to check for type 2 diabetes  o 20% weight loss and 30 minutes of exercise 5 times per week reduces the risk of type 2 diabetes   Breastfeeding guidelines   Report blood glucose levels to [address] weekly or as directed  o Phone: [phone]  If no response in 24 hours, call [phone]    o Fax: [phone]  o Email: [email or web address]    Please contact the [facility]regnancy Program at [phone] if you have questions  Time spent with patient 10:30-11:25 AM; time spent face to face counseling greater than 50% of the appointment      Sincerely,     Tere Caldera  Diabetes Educator  [facility]

## 2018-06-13 ENCOUNTER — TELEPHONE (OUTPATIENT)
Dept: PERINATAL CARE | Facility: CLINIC | Age: 33
End: 2018-06-13

## 2018-06-13 ENCOUNTER — APPOINTMENT (OUTPATIENT)
Dept: LAB | Facility: HOSPITAL | Age: 33
End: 2018-06-13
Attending: OBSTETRICS & GYNECOLOGY
Payer: COMMERCIAL

## 2018-06-13 DIAGNOSIS — Z3A.21 21 WEEKS GESTATION OF PREGNANCY: ICD-10-CM

## 2018-06-13 DIAGNOSIS — Z34.02 ENCOUNTER FOR SUPERVISION OF NORMAL FIRST PREGNANCY IN SECOND TRIMESTER: ICD-10-CM

## 2018-06-13 DIAGNOSIS — E66.01 MATERNAL MORBID OBESITY, ANTEPARTUM, SECOND TRIMESTER (HCC): ICD-10-CM

## 2018-06-13 DIAGNOSIS — O99.212 MATERNAL MORBID OBESITY, ANTEPARTUM, SECOND TRIMESTER (HCC): ICD-10-CM

## 2018-06-13 LAB
ERYTHROCYTE [DISTWIDTH] IN BLOOD BY AUTOMATED COUNT: 13 % (ref 11.6–15.1)
HCT VFR BLD AUTO: 32.4 % (ref 34.8–46.1)
HGB BLD-MCNC: 10.7 G/DL (ref 11.5–15.4)
MCH RBC QN AUTO: 30.2 PG (ref 26.8–34.3)
MCHC RBC AUTO-ENTMCNC: 33 G/DL (ref 31.4–37.4)
MCV RBC AUTO: 92 FL (ref 82–98)
PLATELET # BLD AUTO: 175 THOUSANDS/UL (ref 149–390)
PMV BLD AUTO: 9.5 FL (ref 8.9–12.7)
RBC # BLD AUTO: 3.54 MILLION/UL (ref 3.81–5.12)
WBC # BLD AUTO: 9.2 THOUSAND/UL (ref 4.31–10.16)

## 2018-06-13 PROCEDURE — 86592 SYPHILIS TEST NON-TREP QUAL: CPT | Performed by: OBSTETRICS & GYNECOLOGY

## 2018-06-13 PROCEDURE — 36415 COLL VENOUS BLD VENIPUNCTURE: CPT | Performed by: OBSTETRICS & GYNECOLOGY

## 2018-06-13 PROCEDURE — 85027 COMPLETE CBC AUTOMATED: CPT | Performed by: OBSTETRICS & GYNECOLOGY

## 2018-06-13 NOTE — TELEPHONE ENCOUNTER
Note      Date:  18  RE: Lori Alberts    : 1985  Estimated Date of Delivery: 18  EGA: 28w0d  OB/GYN: Driss Gates     Diet controlled GDM     Date Fasting Post-  breakfast Post-  lunch Post-  dinner Before bedtime Carbs Comments    81             94 102 101 97          83 98 113 99          79 94 102 114          87 103 97 88          72 93 102 116         6/10  6/11  6/12 87  88  81 102  97  102 95  112  97 105  98               Current regimen:  2400 diabetes and pregnancy diet with 3 meals/snacks including protein with protein only hs snack    SMBG fasting and 2 hours after meals with One Touch Verio meter      Plan:  Continue current regimen      Date due to report next:  Wednesday, 18     Transcribed by:Brittney Paz blood sugars login via email    Freddy Mccollum, [address]  Diabetes Educator   [facility]

## 2018-06-14 LAB — RPR SER QL: NORMAL

## 2018-06-15 ENCOUNTER — ROUTINE PRENATAL (OUTPATIENT)
Dept: OBGYN CLINIC | Facility: CLINIC | Age: 33
End: 2018-06-15
Payer: COMMERCIAL

## 2018-06-15 VITALS — WEIGHT: 261 LBS | SYSTOLIC BLOOD PRESSURE: 112 MMHG | DIASTOLIC BLOOD PRESSURE: 74 MMHG | BODY MASS INDEX: 40.88 KG/M2

## 2018-06-15 DIAGNOSIS — Z34.02 ENCOUNTER FOR SUPERVISION OF NORMAL FIRST PREGNANCY IN SECOND TRIMESTER: ICD-10-CM

## 2018-06-15 DIAGNOSIS — O24.410 DIET CONTROLLED GESTATIONAL DIABETES MELLITUS (GDM) IN SECOND TRIMESTER: Primary | ICD-10-CM

## 2018-06-15 PROCEDURE — PNV: Performed by: PHYSICIAN ASSISTANT

## 2018-06-15 PROCEDURE — 90715 TDAP VACCINE 7 YRS/> IM: CPT

## 2018-06-15 PROCEDURE — 90471 IMMUNIZATION ADMIN: CPT

## 2018-06-15 NOTE — ASSESSMENT & PLAN NOTE
All glucose values completely normal  3hr GTT was normal except for fasting glucose of 95  Pt hoping that with her next set of values she can discontinue monitoring

## 2018-06-15 NOTE — PROGRESS NOTES
Problem List Items Addressed This Visit     Encounter for supervision of normal first pregnancy in second trimester     Feels well overall  It's a boy, no name yet, good fetal movement  TDAP today  Small amt ankle swelling after working 3 x 12 hour shifts without compression stockings - plans to get new stockings (current ones don't fit)         Gestational diabetes mellitus (GDM) - Primary     All glucose values completely normal  3hr GTT was normal except for fasting glucose of 95  Pt hoping that with her next set of values she can discontinue monitoring

## 2018-06-15 NOTE — ASSESSMENT & PLAN NOTE
Feels well overall  It's a boy, no name yet, good fetal movement  TDAP today  Small amt ankle swelling after working 3 x 12 hour shifts without compression stockings - plans to get new stockings (current ones don't fit)

## 2018-06-26 ENCOUNTER — ROUTINE PRENATAL (OUTPATIENT)
Dept: OBGYN CLINIC | Facility: CLINIC | Age: 33
End: 2018-06-26

## 2018-06-26 VITALS — WEIGHT: 260.4 LBS | SYSTOLIC BLOOD PRESSURE: 110 MMHG | BODY MASS INDEX: 40.78 KG/M2 | DIASTOLIC BLOOD PRESSURE: 80 MMHG

## 2018-06-26 DIAGNOSIS — Z34.03 ENCOUNTER FOR SUPERVISION OF NORMAL FIRST PREGNANCY IN THIRD TRIMESTER: Primary | ICD-10-CM

## 2018-06-26 DIAGNOSIS — O24.410 DIET CONTROLLED GESTATIONAL DIABETES MELLITUS (GDM) IN THIRD TRIMESTER: ICD-10-CM

## 2018-06-26 PROBLEM — Z3A.29 29 WEEKS GESTATION OF PREGNANCY: Status: ACTIVE | Noted: 2018-02-22

## 2018-06-26 PROBLEM — Z34.02 PREGNANCY, FIRST, OBSTETRICAL CARE, SECOND TRIMESTER: Status: RESOLVED | Noted: 2018-05-14 | Resolved: 2018-06-26

## 2018-06-26 PROBLEM — O99.213 OBESITY COMPLICATING PREGNANCY IN THIRD TRIMESTER: Status: ACTIVE | Noted: 2018-02-22

## 2018-06-26 PROCEDURE — PNV: Performed by: OBSTETRICS & GYNECOLOGY

## 2018-06-26 NOTE — PROGRESS NOTES
Renae Spine is doing well  Some edema B/L LE - is helping with epic go live at other hospitals in network this week - has not always been wearing her compression socks  Not following diet and her blood sugars have been well controlled - hopes she can stop checking soon  Will FU with [facility]  Has growth scan and then APFS weekly at [address] [facility]

## 2018-07-12 ENCOUNTER — DOCUMENTATION (OUTPATIENT)
Dept: PERINATAL CARE | Facility: CLINIC | Age: 33
End: 2018-07-12

## 2018-07-12 NOTE — PROGRESS NOTES
Note      Date:  18  RE: Lilliam Pandey    : 1985  Estimated Date of Delivery: 18  EGA: 32w0d  OB/GYN: Cuauhtemoc Dumont     Diet controlled GDM     Date Fasting Post-  breakfast Post-  lunch Post-  dinner Comments    91 102 112 97     /3 82 99 108 96     / 79 101 113 107      87 94 108 117     /6 76 91 117 105     7/7 84 102 92 113     7/8 87 101 136 118  picnic    86 101 113 97    7/10 72 97 108 113         Last reported glucose readings 18, reports being on vacation and now is back on track with diet  Current regimen:  2400 diabetes and pregnancy diet with 3 meals/snacks including protein with protein only bedtime snack  SMBG fasting and 2 hours after meals with One Touch Verio meter      Plan:  Continue GDM diet and SMBG    Please report minimally weekly       Date due to report next:  Tuesday, 18    NIMISHA Johnson  Diabetes Educator   [facility]

## 2018-07-13 ENCOUNTER — ULTRASOUND (OUTPATIENT)
Dept: PERINATAL CARE | Facility: CLINIC | Age: 33
End: 2018-07-13
Payer: COMMERCIAL

## 2018-07-13 ENCOUNTER — ROUTINE PRENATAL (OUTPATIENT)
Dept: OBGYN CLINIC | Facility: CLINIC | Age: 33
End: 2018-07-13

## 2018-07-13 VITALS
HEIGHT: 67 IN | DIASTOLIC BLOOD PRESSURE: 78 MMHG | HEART RATE: 89 BPM | BODY MASS INDEX: 41.11 KG/M2 | SYSTOLIC BLOOD PRESSURE: 116 MMHG | WEIGHT: 261.9 LBS

## 2018-07-13 VITALS — SYSTOLIC BLOOD PRESSURE: 120 MMHG | WEIGHT: 261.8 LBS | BODY MASS INDEX: 41 KG/M2 | DIASTOLIC BLOOD PRESSURE: 62 MMHG

## 2018-07-13 DIAGNOSIS — IMO0002 EVALUATE ANATOMY NOT SEEN ON PRIOR SONOGRAM: Primary | ICD-10-CM

## 2018-07-13 DIAGNOSIS — O99.213 OBESITY COMPLICATING PREGNANCY IN THIRD TRIMESTER: ICD-10-CM

## 2018-07-13 DIAGNOSIS — Z36.89 ENCOUNTER FOR ULTRASOUND TO ASSESS INTERVAL GROWTH OF FETUS: ICD-10-CM

## 2018-07-13 DIAGNOSIS — Z3A.32 32 WEEKS GESTATION OF PREGNANCY: ICD-10-CM

## 2018-07-13 DIAGNOSIS — Z34.03 ENCOUNTER FOR SUPERVISION OF NORMAL FIRST PREGNANCY IN THIRD TRIMESTER: ICD-10-CM

## 2018-07-13 DIAGNOSIS — Z3A.32 32 WEEKS GESTATION OF PREGNANCY: Primary | ICD-10-CM

## 2018-07-13 DIAGNOSIS — O24.410 DIET CONTROLLED GESTATIONAL DIABETES MELLITUS (GDM) IN THIRD TRIMESTER: ICD-10-CM

## 2018-07-13 PROCEDURE — PNV: Performed by: NURSE PRACTITIONER

## 2018-07-13 PROCEDURE — 76816 OB US FOLLOW-UP PER FETUS: CPT | Performed by: OBSTETRICS & GYNECOLOGY

## 2018-07-13 PROCEDURE — 99212 OFFICE O/P EST SF 10 MIN: CPT | Performed by: OBSTETRICS & GYNECOLOGY

## 2018-07-13 NOTE — ASSESSMENT & PLAN NOTE
The patient reports she disputes this diagnosis and has appt later today with [facility] to review BG log  She reports normal values despite not following the diet  3hr gtt with fasting of 95; normal 1, 2, and 3 hour values  Advised we would defer to their interpretation

## 2018-07-13 NOTE — PATIENT INSTRUCTIONS
Thank you for choosing [facility] for your  care today  If you have any questions about your ultrasound or care, please do not hesitate to contact us or your primary obstetrician

## 2018-07-13 NOTE — ASSESSMENT & PLAN NOTE
Denies OB complaints  Good fetal movement  Denies contractions, cramping, leakage of fluid or vaginal bleeding  S/p tdap vaccine  Baby and Me considerations reinforced  Reviewed  labor precautions and FKCs

## 2018-07-13 NOTE — PROGRESS NOTES
Problem List Items Addressed This Visit     32 weeks gestation of pregnancy - Primary    Obesity complicating pregnancy in third trimester     Growth scan scheduled today  Weekly NST and TANJA scheduled at 36 weeks per MFM  Encounter for supervision of normal first pregnancy in third trimester     Denies OB complaints  Good fetal movement  Denies contractions, cramping, leakage of fluid or vaginal bleeding  S/p tdap vaccine  Baby and Me considerations reinforced  Reviewed  labor precautions and FKCs  Gestational diabetes mellitus (GDM)     The patient reports she disputes this diagnosis and has appt later today with [facility] to review BG log  She reports normal values despite not following the diet  3hr gtt with fasting of 95; normal 1, 2, and 3 hour values  Advised we would defer to their interpretation

## 2018-07-13 NOTE — PROGRESS NOTES
[address]: Ms Tl Blue was seen today at [place] [address] for fetal growth and followup missed anatomy ultrasound  See ultrasound report under "OB Procedures" tab  Please don't hesitate to contact our office with any concerns or questions    Alina Shirley MD

## 2018-07-27 ENCOUNTER — ROUTINE PRENATAL (OUTPATIENT)
Dept: OBGYN CLINIC | Facility: CLINIC | Age: 33
End: 2018-07-27

## 2018-07-27 VITALS — WEIGHT: 264.6 LBS | SYSTOLIC BLOOD PRESSURE: 120 MMHG | DIASTOLIC BLOOD PRESSURE: 82 MMHG | BODY MASS INDEX: 41.44 KG/M2

## 2018-07-27 DIAGNOSIS — O99.213 OBESITY COMPLICATING PREGNANCY IN THIRD TRIMESTER: ICD-10-CM

## 2018-07-27 DIAGNOSIS — Z3A.34 34 WEEKS GESTATION OF PREGNANCY: Primary | ICD-10-CM

## 2018-07-27 DIAGNOSIS — Z34.03 ENCOUNTER FOR SUPERVISION OF NORMAL FIRST PREGNANCY IN THIRD TRIMESTER: ICD-10-CM

## 2018-07-27 DIAGNOSIS — O24.410 DIET CONTROLLED GESTATIONAL DIABETES MELLITUS (GDM) IN THIRD TRIMESTER: ICD-10-CM

## 2018-07-27 PROCEDURE — PNV: Performed by: NURSE PRACTITIONER

## 2018-07-27 NOTE — PROGRESS NOTES
Problem List Items Addressed This Visit     34 weeks gestation of pregnancy - Primary    Obesity complicating pregnancy in third trimester     BMI >41  Weekly  testing and growth surveillance is scheduled  Encounter for supervision of normal first pregnancy in third trimester     Denies OB complaints  Good fetal movement  Denies contractions, cramping, leakage of fluid or vaginal bleeding  18 EFW 75%, AC 80  TANJA 14 9  Vertex  S/p tdap vaccine  Baby and Me considerations reinforced  Reviewed  labor precautions and FKCs  Gestational diabetes mellitus (GDM)     Continued BG monitoring was recommended by ADAN Remy reports normal BGs despite not following diet  Weekly  testing and growth surveillance are scheduled

## 2018-07-27 NOTE — ASSESSMENT & PLAN NOTE
Denies OB complaints  Good fetal movement  Denies contractions, cramping, leakage of fluid or vaginal bleeding  18 EFW 75%, AC 80  TANJA 14 9  Vertex  S/p tdap vaccine  Baby and Me considerations reinforced  Reviewed  labor precautions and FKCs

## 2018-07-27 NOTE — ASSESSMENT & PLAN NOTE
Continued BG monitoring was recommended by ADAN Remy reports normal BGs despite not following diet  Weekly  testing and growth surveillance are scheduled

## 2018-08-10 ENCOUNTER — ROUTINE PRENATAL (OUTPATIENT)
Dept: OBGYN CLINIC | Facility: CLINIC | Age: 33
End: 2018-08-10

## 2018-08-10 ENCOUNTER — ULTRASOUND (OUTPATIENT)
Dept: PERINATAL CARE | Facility: CLINIC | Age: 33
End: 2018-08-10
Payer: COMMERCIAL

## 2018-08-10 VITALS
BODY MASS INDEX: 42.09 KG/M2 | RESPIRATION RATE: 14 BRPM | HEIGHT: 67 IN | SYSTOLIC BLOOD PRESSURE: 112 MMHG | WEIGHT: 268.2 LBS | DIASTOLIC BLOOD PRESSURE: 74 MMHG

## 2018-08-10 VITALS
HEART RATE: 94 BPM | BODY MASS INDEX: 42.09 KG/M2 | SYSTOLIC BLOOD PRESSURE: 118 MMHG | WEIGHT: 268.2 LBS | DIASTOLIC BLOOD PRESSURE: 81 MMHG | HEIGHT: 67 IN

## 2018-08-10 DIAGNOSIS — Z34.03 ENCOUNTER FOR SUPERVISION OF NORMAL FIRST PREGNANCY IN THIRD TRIMESTER: ICD-10-CM

## 2018-08-10 DIAGNOSIS — O99.213 OBESITY COMPLICATING PREGNANCY IN THIRD TRIMESTER: ICD-10-CM

## 2018-08-10 DIAGNOSIS — Z3A.34 34 WEEKS GESTATION OF PREGNANCY: ICD-10-CM

## 2018-08-10 DIAGNOSIS — Z3A.36 36 WEEKS GESTATION OF PREGNANCY: ICD-10-CM

## 2018-08-10 DIAGNOSIS — Z3A.36 36 WEEKS GESTATION OF PREGNANCY: Primary | ICD-10-CM

## 2018-08-10 PROCEDURE — 87653 STREP B DNA AMP PROBE: CPT | Performed by: OBSTETRICS & GYNECOLOGY

## 2018-08-10 PROCEDURE — 76816 OB US FOLLOW-UP PER FETUS: CPT | Performed by: OBSTETRICS & GYNECOLOGY

## 2018-08-10 PROCEDURE — PNV: Performed by: OBSTETRICS & GYNECOLOGY

## 2018-08-10 PROCEDURE — 99212 OFFICE O/P EST SF 10 MIN: CPT | Performed by: OBSTETRICS & GYNECOLOGY

## 2018-08-10 NOTE — PROGRESS NOTES
[address]: Ms Adora Lundborg was seen today at 36w2d for fetal growth assessment ultrasound  See ultrasound report under "OB Procedures" tab  Baby is large; diabetes remains consistent with GDMA1  Please don't hesitate to contact our office with any concerns or questions    Srikanth Kenny MD

## 2018-08-10 NOTE — Clinical Note
Hi this baby is measuring big but I tried to carefully word my report to not box anyone in in terms of management going forward  I did not strongly recommend any further growth assessments however if you want one we will do one  Nidia Shaw she is seeing you today, Ynes Denny you are next I believe    Thanks, Joan Eddy

## 2018-08-10 NOTE — PROGRESS NOTES
Patient is a 30-year-old female para 0 at 36 weeks and 2 days here for routine prenatal visit without complaint  Pregnancy is complicated by gestational diabetes and suspected fetal macrosomia  Review of systems is positive for fetal movement negative for loss of fluid vaginal bleeding or regular contractions  Group B strep culture was performed today  We discussed signs and symptoms of labor

## 2018-08-12 LAB — GP B STREP DNA SPEC QL NAA+PROBE: ABNORMAL

## 2018-08-17 ENCOUNTER — ROUTINE PRENATAL (OUTPATIENT)
Dept: PERINATAL CARE | Facility: CLINIC | Age: 33
End: 2018-08-17
Payer: COMMERCIAL

## 2018-08-17 ENCOUNTER — ROUTINE PRENATAL (OUTPATIENT)
Dept: OBGYN CLINIC | Facility: CLINIC | Age: 33
End: 2018-08-17

## 2018-08-17 VITALS
DIASTOLIC BLOOD PRESSURE: 83 MMHG | HEIGHT: 67 IN | HEART RATE: 83 BPM | BODY MASS INDEX: 42.09 KG/M2 | SYSTOLIC BLOOD PRESSURE: 115 MMHG | WEIGHT: 268.2 LBS

## 2018-08-17 VITALS — WEIGHT: 268 LBS | BODY MASS INDEX: 41.97 KG/M2 | SYSTOLIC BLOOD PRESSURE: 118 MMHG | DIASTOLIC BLOOD PRESSURE: 74 MMHG

## 2018-08-17 DIAGNOSIS — O24.410 DIET CONTROLLED GESTATIONAL DIABETES MELLITUS (GDM) IN THIRD TRIMESTER: ICD-10-CM

## 2018-08-17 DIAGNOSIS — Z3A.37 37 WEEKS GESTATION OF PREGNANCY: ICD-10-CM

## 2018-08-17 DIAGNOSIS — O99.213 MATERNAL MORBID OBESITY IN THIRD TRIMESTER, ANTEPARTUM (HCC): Primary | ICD-10-CM

## 2018-08-17 DIAGNOSIS — Z34.03 ENCOUNTER FOR SUPERVISION OF NORMAL FIRST PREGNANCY IN THIRD TRIMESTER: ICD-10-CM

## 2018-08-17 DIAGNOSIS — Z34.03 ENCOUNTER FOR SUPERVISION OF NORMAL FIRST PREGNANCY IN THIRD TRIMESTER: Primary | ICD-10-CM

## 2018-08-17 DIAGNOSIS — E66.01 MATERNAL MORBID OBESITY IN THIRD TRIMESTER, ANTEPARTUM (HCC): Primary | ICD-10-CM

## 2018-08-17 PROBLEM — B01.9 VARICELLA: Status: RESOLVED | Noted: 2018-08-17 | Resolved: 2018-08-17

## 2018-08-17 PROBLEM — Z3A.34 34 WEEKS GESTATION OF PREGNANCY: Status: RESOLVED | Noted: 2018-02-22 | Resolved: 2018-08-17

## 2018-08-17 PROCEDURE — 59025 FETAL NON-STRESS TEST: CPT | Performed by: OBSTETRICS & GYNECOLOGY

## 2018-08-17 PROCEDURE — 76815 OB US LIMITED FETUS(S): CPT | Performed by: OBSTETRICS & GYNECOLOGY

## 2018-08-17 PROCEDURE — PNV: Performed by: PHYSICIAN ASSISTANT

## 2018-08-17 NOTE — ASSESSMENT & PLAN NOTE
Feels well overall, ready to deliver  Good fetal movement, it's a boy - maybe Harmeet  GBS positive  Last growth scan at 36 weeks showed EFW at 8 lbs - no plan for further growth scans

## 2018-08-17 NOTE — ASSESSMENT & PLAN NOTE
Says she is checking her glucose values QID but is bad about turning them in  Says they are all "within range"  Going for weekly APFS

## 2018-08-18 PROBLEM — Z3A.37 37 WEEKS GESTATION OF PREGNANCY: Status: ACTIVE | Noted: 2018-02-22

## 2018-08-22 ENCOUNTER — TRANSCRIBE ORDERS (OUTPATIENT)
Dept: ADMINISTRATIVE | Facility: HOSPITAL | Age: 33
End: 2018-08-22

## 2018-08-22 ENCOUNTER — APPOINTMENT (OUTPATIENT)
Dept: LAB | Facility: HOSPITAL | Age: 33
End: 2018-08-22

## 2018-08-22 DIAGNOSIS — Z00.8 HEALTH EXAMINATION IN POPULATION SURVEY: Primary | ICD-10-CM

## 2018-08-22 DIAGNOSIS — Z00.8 HEALTH EXAMINATION IN POPULATION SURVEY: ICD-10-CM

## 2018-08-22 LAB
CHOLEST SERPL-MCNC: 195 MG/DL (ref 50–200)
EST. AVERAGE GLUCOSE BLD GHB EST-MCNC: 120 MG/DL
HBA1C MFR BLD: 5.8 % (ref 4.2–6.3)
HDLC SERPL-MCNC: 42 MG/DL (ref 40–60)
LDLC SERPL CALC-MCNC: 79 MG/DL (ref 0–100)
NONHDLC SERPL-MCNC: 153 MG/DL
TRIGL SERPL-MCNC: 371 MG/DL

## 2018-08-22 PROCEDURE — 83036 HEMOGLOBIN GLYCOSYLATED A1C: CPT

## 2018-08-22 PROCEDURE — 80061 LIPID PANEL: CPT

## 2018-08-22 PROCEDURE — 36415 COLL VENOUS BLD VENIPUNCTURE: CPT

## 2018-08-24 ENCOUNTER — ROUTINE PRENATAL (OUTPATIENT)
Dept: OBGYN CLINIC | Facility: CLINIC | Age: 33
End: 2018-08-24

## 2018-08-24 ENCOUNTER — TELEPHONE (OUTPATIENT)
Dept: PERINATAL CARE | Facility: CLINIC | Age: 33
End: 2018-08-24

## 2018-08-24 ENCOUNTER — ROUTINE PRENATAL (OUTPATIENT)
Dept: PERINATAL CARE | Facility: CLINIC | Age: 33
End: 2018-08-24
Payer: COMMERCIAL

## 2018-08-24 VITALS
WEIGHT: 272 LBS | RESPIRATION RATE: 14 BRPM | DIASTOLIC BLOOD PRESSURE: 74 MMHG | HEIGHT: 67 IN | SYSTOLIC BLOOD PRESSURE: 120 MMHG | BODY MASS INDEX: 42.69 KG/M2

## 2018-08-24 VITALS
HEART RATE: 80 BPM | WEIGHT: 270.7 LBS | BODY MASS INDEX: 42.49 KG/M2 | DIASTOLIC BLOOD PRESSURE: 81 MMHG | SYSTOLIC BLOOD PRESSURE: 114 MMHG | HEIGHT: 67 IN

## 2018-08-24 DIAGNOSIS — Z34.03 ENCOUNTER FOR SUPERVISION OF NORMAL FIRST PREGNANCY IN THIRD TRIMESTER: ICD-10-CM

## 2018-08-24 DIAGNOSIS — O40.3XX0 POLYHYDRAMNIOS AFFECTING PREGNANCY IN THIRD TRIMESTER: Primary | ICD-10-CM

## 2018-08-24 DIAGNOSIS — Z3A.38 38 WEEKS GESTATION OF PREGNANCY: ICD-10-CM

## 2018-08-24 DIAGNOSIS — O99.213 MATERNAL MORBID OBESITY IN THIRD TRIMESTER, ANTEPARTUM (HCC): ICD-10-CM

## 2018-08-24 DIAGNOSIS — Z3A.37 37 WEEKS GESTATION OF PREGNANCY: ICD-10-CM

## 2018-08-24 DIAGNOSIS — E66.01 MATERNAL MORBID OBESITY IN THIRD TRIMESTER, ANTEPARTUM (HCC): ICD-10-CM

## 2018-08-24 PROCEDURE — 76815 OB US LIMITED FETUS(S): CPT | Performed by: OBSTETRICS & GYNECOLOGY

## 2018-08-24 PROCEDURE — 59025 FETAL NON-STRESS TEST: CPT | Performed by: OBSTETRICS & GYNECOLOGY

## 2018-08-24 PROCEDURE — PNV: Performed by: OBSTETRICS & GYNECOLOGY

## 2018-08-24 NOTE — TELEPHONE ENCOUNTER
Note      Date: 18  RE: Constance Horne  : 1985  Estimated Date of Delivery: 18  EGA: 38w2d  OB/GYN: SLOGA     Diet controlled GDM     Date Fasting Post-  breakfast Post-  lunch Post-  dinner Comments    89 74 87 112     94 87 97 102    8/ 82 103 113 98    / 74 92 97 107    8/3 85 110 94 99    / 71 87 120 96     87 102 113 124     91 102 84 113     83 97 111 92     76 89 100 125     92 86 113 97    8/10 80 92 97 104     84 76 103 118     91 102 87 96     88 73 101 110     79 96 89 107    8/15 89 102 75 97     84 102 98 108     91 88 113 97     72 95 87 103     84 87 104 118     87 95 102 111     76 87 82 96     84 75 104 118     81 95 113                                                         Last reported glucose readings 7/10/18      Current regimen:  2400 diabetes and pregnancy diet with 3 meals/snacks including protein with protein only bedtime snack  SMBG fasting and 2 hours after meals with One Touch Verio meter      Plan:  Continue GDM diet, stay active if no restriction from your OB  Continue SMBG    Please report minimally weekly       Date due to report next:  Report weekly until delivery       Transcribed by:  Sadia Erwin blood sugar log via email    NIMISHA Vega  Diabetes Educator   [facility]

## 2018-08-24 NOTE — PROGRESS NOTES
Ms Kaley Raymond  is a 30 yo G 1  P0 at 45 2/7  weeks gestation who presents for a fetal ultrasound examination for evaluation of TANJA and NST  Hx of elevated BMUI at 42 3 and GDM A2  She is asymptomatic  1  TANJA : 24 2  (Polyhydramnios)  New diagnosis  2  NST: reactive      Recommendations; 1  Twice a week NSTs, once a week TANJA  2  Fetal movement counts  3  Consider delivery by 39 weeks and earlier if any other changes are identified in her or the fetus      Murray Rutledge MD

## 2018-08-24 NOTE — PROGRESS NOTES
Patient is a 27-year-old female, P0, at 38 weeks and 2 days here for a routine prenatal visit without complaint  Patient's pregnancy is complicated by A2 GDM, suspected fetal macrosomia, and polyhydramnios   Center recommending induction at 39 weeks  Induction scheduled for  at 8:00 p m

## 2018-08-29 ENCOUNTER — HOSPITAL ENCOUNTER (INPATIENT)
Facility: HOSPITAL | Age: 33
LOS: 3 days | Discharge: HOME | End: 2018-09-01
Attending: OBSTETRICS & GYNECOLOGY | Admitting: OBSTETRICS & GYNECOLOGY
Payer: COMMERCIAL

## 2018-08-29 ENCOUNTER — HOSPITAL ENCOUNTER (OUTPATIENT)
Dept: LABOR AND DELIVERY | Facility: HOSPITAL | Age: 33
Discharge: HOME | End: 2018-08-29
Payer: COMMERCIAL

## 2018-08-29 LAB
ABO GROUP BLD: NORMAL
BLD GP AB SCN SERPL QL: NEGATIVE
ERYTHROCYTE [DISTWIDTH] IN BLOOD BY AUTOMATED COUNT: 12.8 % (ref 11.6–15.1)
GLUCOSE SERPL-MCNC: 72 MG/DL (ref 65–140)
HCT VFR BLD AUTO: 29.4 % (ref 34.8–46.1)
HGB BLD-MCNC: 9.7 G/DL (ref 11.5–15.4)
MCH RBC QN AUTO: 29.8 PG (ref 26.8–34.3)
MCHC RBC AUTO-ENTMCNC: 33 G/DL (ref 31.4–37.4)
MCV RBC AUTO: 90 FL (ref 82–98)
PLATELET # BLD AUTO: 158 THOUSANDS/UL (ref 149–390)
PMV BLD AUTO: 10.2 FL (ref 8.9–12.7)
RBC # BLD AUTO: 3.26 MILLION/UL (ref 3.81–5.12)
RH BLD: POSITIVE
SPECIMEN EXPIRATION DATE: NORMAL
WBC # BLD AUTO: 9.99 THOUSAND/UL (ref 4.31–10.16)

## 2018-08-29 PROCEDURE — 82948 REAGENT STRIP/BLOOD GLUCOSE: CPT

## 2018-08-29 PROCEDURE — 86900 BLOOD TYPING SEROLOGIC ABO: CPT | Performed by: OBSTETRICS & GYNECOLOGY

## 2018-08-29 PROCEDURE — 86592 SYPHILIS TEST NON-TREP QUAL: CPT | Performed by: OBSTETRICS & GYNECOLOGY

## 2018-08-29 PROCEDURE — 85027 COMPLETE CBC AUTOMATED: CPT | Performed by: OBSTETRICS & GYNECOLOGY

## 2018-08-29 PROCEDURE — 86850 RBC ANTIBODY SCREEN: CPT | Performed by: OBSTETRICS & GYNECOLOGY

## 2018-08-29 PROCEDURE — 86901 BLOOD TYPING SEROLOGIC RH(D): CPT | Performed by: OBSTETRICS & GYNECOLOGY

## 2018-08-29 RX ORDER — ALBUTEROL SULFATE 90 UG/1
2 AEROSOL, METERED RESPIRATORY (INHALATION) EVERY 4 HOURS PRN
Status: DISCONTINUED | OUTPATIENT
Start: 2018-08-29 | End: 2018-08-30

## 2018-08-29 RX ORDER — SODIUM CHLORIDE, SODIUM LACTATE, POTASSIUM CHLORIDE, CALCIUM CHLORIDE 600; 310; 30; 20 MG/100ML; MG/100ML; MG/100ML; MG/100ML
125 INJECTION, SOLUTION INTRAVENOUS CONTINUOUS
Status: DISCONTINUED | OUTPATIENT
Start: 2018-08-29 | End: 2018-08-29

## 2018-08-29 RX ORDER — SODIUM CHLORIDE 9 MG/ML
125 INJECTION, SOLUTION INTRAVENOUS CONTINUOUS
Status: DISCONTINUED | OUTPATIENT
Start: 2018-08-29 | End: 2018-08-30

## 2018-08-29 RX ORDER — ONDANSETRON 2 MG/ML
4 INJECTION INTRAMUSCULAR; INTRAVENOUS EVERY 6 HOURS PRN
Status: DISCONTINUED | OUTPATIENT
Start: 2018-08-29 | End: 2018-08-30

## 2018-08-29 RX ORDER — FLUTICASONE FUROATE AND VILANTEROL 200; 25 UG/1; UG/1
1 POWDER RESPIRATORY (INHALATION) DAILY
Status: DISCONTINUED | OUTPATIENT
Start: 2018-08-30 | End: 2018-08-30

## 2018-08-29 RX ADMIN — SODIUM CHLORIDE 125 ML/HR: 0.9 INJECTION, SOLUTION INTRAVENOUS at 21:20

## 2018-08-30 ENCOUNTER — ANESTHESIA (INPATIENT)
Dept: LABOR AND DELIVERY | Facility: HOSPITAL | Age: 33
End: 2018-08-30
Payer: COMMERCIAL

## 2018-08-30 ENCOUNTER — ANESTHESIA EVENT (INPATIENT)
Dept: LABOR AND DELIVERY | Facility: HOSPITAL | Age: 33
End: 2018-08-30
Payer: COMMERCIAL

## 2018-08-30 PROBLEM — Z3A.39 39 WEEKS GESTATION OF PREGNANCY: Status: ACTIVE | Noted: 2018-02-22

## 2018-08-30 LAB
BASE EXCESS BLDCOA CALC-SCNC: -6.1 MMOL/L (ref 3–11)
BASE EXCESS BLDCOV CALC-SCNC: -5.5 MMOL/L (ref 1–9)
GLUCOSE SERPL-MCNC: 73 MG/DL (ref 65–140)
GLUCOSE SERPL-MCNC: 76 MG/DL (ref 65–140)
GLUCOSE SERPL-MCNC: 78 MG/DL (ref 65–140)
HCO3 BLDCOA-SCNC: 21.7 MMOL/L (ref 17.3–27.3)
HCO3 BLDCOV-SCNC: 19.9 MMOL/L (ref 12.2–28.6)
O2 CT VFR BLDCOA CALC: 7.8 ML/DL
OXYHGB MFR BLDCOA: 37.1 %
OXYHGB MFR BLDCOV: 50 %
PCO2 BLDCOA: 51.5 MM[HG] (ref 30–60)
PCO2 BLDCOV: 38.7 MM HG (ref 27–43)
PH BLDCOA: 7.24 [PH] (ref 7.23–7.43)
PH BLDCOV: 7.33 [PH] (ref 7.19–7.49)
PO2 BLDCOA: 20.7 MM HG (ref 5–25)
PO2 BLDCOV: 23 MM HG (ref 15–45)
RPR SER QL: NORMAL
SAO2 % BLDCOV: 10 ML/DL

## 2018-08-30 PROCEDURE — 10907ZC DRAINAGE OF AMNIOTIC FLUID, THERAPEUTIC FROM PRODUCTS OF CONCEPTION, VIA NATURAL OR ARTIFICIAL OPENING: ICD-10-PCS | Performed by: OBSTETRICS & GYNECOLOGY

## 2018-08-30 PROCEDURE — 3E033VJ INTRODUCTION OF OTHER HORMONE INTO PERIPHERAL VEIN, PERCUTANEOUS APPROACH: ICD-10-PCS | Performed by: OBSTETRICS & GYNECOLOGY

## 2018-08-30 PROCEDURE — 82948 REAGENT STRIP/BLOOD GLUCOSE: CPT

## 2018-08-30 PROCEDURE — 82805 BLOOD GASES W/O2 SATURATION: CPT | Performed by: OBSTETRICS & GYNECOLOGY

## 2018-08-30 PROCEDURE — 0KQM0ZZ REPAIR PERINEUM MUSCLE, OPEN APPROACH: ICD-10-PCS | Performed by: OBSTETRICS & GYNECOLOGY

## 2018-08-30 RX ORDER — IBUPROFEN 600 MG/1
600 TABLET ORAL EVERY 6 HOURS PRN
Status: DISCONTINUED | OUTPATIENT
Start: 2018-08-30 | End: 2018-09-02 | Stop reason: HOSPADM

## 2018-08-30 RX ORDER — DOCUSATE SODIUM 100 MG/1
100 CAPSULE, LIQUID FILLED ORAL 2 TIMES DAILY
Status: DISCONTINUED | OUTPATIENT
Start: 2018-08-31 | End: 2018-09-02 | Stop reason: HOSPADM

## 2018-08-30 RX ORDER — SENNOSIDES 8.6 MG
1 TABLET ORAL DAILY
Status: DISCONTINUED | OUTPATIENT
Start: 2018-08-31 | End: 2018-09-02 | Stop reason: HOSPADM

## 2018-08-30 RX ORDER — ONDANSETRON 2 MG/ML
4 INJECTION INTRAMUSCULAR; INTRAVENOUS EVERY 8 HOURS PRN
Status: DISCONTINUED | OUTPATIENT
Start: 2018-08-30 | End: 2018-09-02 | Stop reason: HOSPADM

## 2018-08-30 RX ORDER — FENTANYL CITRATE 50 UG/ML
INJECTION, SOLUTION INTRAMUSCULAR; INTRAVENOUS AS NEEDED
Status: DISCONTINUED | OUTPATIENT
Start: 2018-08-30 | End: 2018-08-31 | Stop reason: SURG

## 2018-08-30 RX ORDER — OXYTOCIN/RINGER'S LACTATE 30/500 ML
1-30 PLASTIC BAG, INJECTION (ML) INTRAVENOUS
Status: DISCONTINUED | OUTPATIENT
Start: 2018-08-30 | End: 2018-08-30

## 2018-08-30 RX ORDER — ACETAMINOPHEN 325 MG/1
650 TABLET ORAL EVERY 6 HOURS PRN
Status: DISCONTINUED | OUTPATIENT
Start: 2018-08-30 | End: 2018-09-02 | Stop reason: HOSPADM

## 2018-08-30 RX ORDER — CALCIUM CARBONATE 200(500)MG
1000 TABLET,CHEWABLE ORAL DAILY PRN
Status: DISCONTINUED | OUTPATIENT
Start: 2018-08-30 | End: 2018-09-02 | Stop reason: HOSPADM

## 2018-08-30 RX ORDER — OXYTOCIN/RINGER'S LACTATE 30/500 ML
62.5 PLASTIC BAG, INJECTION (ML) INTRAVENOUS CONTINUOUS
Status: DISPENSED | OUTPATIENT
Start: 2018-08-30 | End: 2018-08-31

## 2018-08-30 RX ORDER — LIDOCAINE HYDROCHLORIDE AND EPINEPHRINE 15; 5 MG/ML; UG/ML
INJECTION, SOLUTION EPIDURAL AS NEEDED
Status: DISCONTINUED | OUTPATIENT
Start: 2018-08-30 | End: 2018-08-31 | Stop reason: SURG

## 2018-08-30 RX ORDER — ROPIVACAINE HYDROCHLORIDE 2 MG/ML
INJECTION, SOLUTION EPIDURAL; INFILTRATION; PERINEURAL AS NEEDED
Status: DISCONTINUED | OUTPATIENT
Start: 2018-08-30 | End: 2018-08-31 | Stop reason: SURG

## 2018-08-30 RX ORDER — OXYTOCIN/RINGER'S LACTATE 30/500 ML
PLASTIC BAG, INJECTION (ML) INTRAVENOUS
Status: COMPLETED
Start: 2018-08-30 | End: 2018-08-30

## 2018-08-30 RX ORDER — DIAPER,BRIEF,INFANT-TODD,DISP
1 EACH MISCELLANEOUS AS NEEDED
Status: DISCONTINUED | OUTPATIENT
Start: 2018-08-30 | End: 2018-09-02 | Stop reason: HOSPADM

## 2018-08-30 RX ADMIN — SODIUM CHLORIDE 2.5 MILLION UNITS: 9 INJECTION, SOLUTION INTRAVENOUS at 08:46

## 2018-08-30 RX ADMIN — Medication 90 MG: at 06:17

## 2018-08-30 RX ADMIN — SODIUM CHLORIDE 2.5 MILLION UNITS: 9 INJECTION, SOLUTION INTRAVENOUS at 12:43

## 2018-08-30 RX ADMIN — MISOPROSTOL 25 MCG: 100 TABLET ORAL at 01:04

## 2018-08-30 RX ADMIN — ROPIVACAINE HYDROCHLORIDE 10 ML: 2 INJECTION, SOLUTION EPIDURAL; INFILTRATION at 10:59

## 2018-08-30 RX ADMIN — SODIUM CHLORIDE 125 ML/HR: 0.9 INJECTION, SOLUTION INTRAVENOUS at 06:17

## 2018-08-30 RX ADMIN — ROPIVACAINE HYDROCHLORIDE 8 ML: 2 INJECTION, SOLUTION EPIDURAL; INFILTRATION at 14:12

## 2018-08-30 RX ADMIN — Medication 2 MILLI-UNITS/MIN: at 16:51

## 2018-08-30 RX ADMIN — SODIUM CHLORIDE 2.5 MILLION UNITS: 9 INJECTION, SOLUTION INTRAVENOUS at 16:52

## 2018-08-30 RX ADMIN — FLUTICASONE FUROATE AND VILANTEROL TRIFENATATE 1 PUFF: 200; 25 POWDER RESPIRATORY (INHALATION) at 08:46

## 2018-08-30 RX ADMIN — SODIUM CHLORIDE 5 MILLION UNITS: 0.9 INJECTION, SOLUTION INTRAVENOUS at 04:44

## 2018-08-30 RX ADMIN — ROPIVACAINE HYDROCHLORIDE: 2 INJECTION, SOLUTION EPIDURAL; INFILTRATION at 17:34

## 2018-08-30 RX ADMIN — SODIUM CHLORIDE 125 ML/HR: 0.9 INJECTION, SOLUTION INTRAVENOUS at 12:15

## 2018-08-30 RX ADMIN — Medication 90 MG: at 14:41

## 2018-08-30 RX ADMIN — LIDOCAINE HYDROCHLORIDE AND EPINEPHRINE 3 ML: 15; 5 INJECTION, SOLUTION EPIDURAL at 10:56

## 2018-08-30 RX ADMIN — ROPIVACAINE HYDROCHLORIDE: 2 INJECTION, SOLUTION EPIDURAL; INFILTRATION at 11:01

## 2018-08-30 RX ADMIN — FENTANYL CITRATE 100 MCG: 50 INJECTION, SOLUTION INTRAMUSCULAR; INTRAVENOUS at 14:11

## 2018-08-30 RX ADMIN — SODIUM CHLORIDE 125 ML/HR: 0.9 INJECTION, SOLUTION INTRAVENOUS at 04:15

## 2018-08-30 RX ADMIN — Medication 2000 MG: at 07:18

## 2018-08-30 RX ADMIN — Medication 2000 MG: at 19:54

## 2018-08-30 RX ADMIN — SODIUM CHLORIDE 125 ML/HR: 0.9 INJECTION, SOLUTION INTRAVENOUS at 19:58

## 2018-08-30 RX ADMIN — Medication 2000 MG: at 13:57

## 2018-08-30 NOTE — OB LABOR/OXYTOCIN SAFETY PROGRESS
Labor Progress Note - Clarisa Castle 29 y o  female MRN: [id number]    Unit/Bed#: -01 Encounter: [id number]    Obstetric History       T0      L0     SAB0   TAB0   Ectopic0   Multiple0   Live Births0    Obstetric Comments   GDM-diet control, asthma     Gestational Age: 39w1d     Contraction Frequency (minutes): 3-5  Contraction Quality: Moderate  Tachysystole: No   Dilation: 4        Effacement (%): 80  Station: -1  Baseline Rate: 145 bpm  Fetal Heart Rate: 148 BPM  FHR Category: Category I          Notes/comments:   Patient is feeling more comfortable with epidural  She still reports back pains on her right side  Will attempt maternal repositioning for better pain control  Contractions every 2-3 minutes  Fetal head shows some decent from previous exam  Cervical dilation and effacement unchanged from previous exam  Will reassess in 2 hours or as indicated  Discussed with Dr Arroyo Comment            Yanira Metcalf MD 2018 12:44 PM

## 2018-08-30 NOTE — OB LABOR/OXYTOCIN SAFETY PROGRESS
Labor Progress Note - Lilliam Pandey 29 y o  female MRN: [id number]    Unit/Bed#: -01 Encounter: [id number]    Obstetric History       T0      L0     SAB0   TAB0   Ectopic0   Multiple0   Live Births0    Obstetric Comments   GDM-diet control, asthma     Gestational Age: 39w1d     Contraction Frequency (minutes): 2-5  Contraction Quality: Mild  Tachysystole: No                  Baseline Rate: 140 bpm  Fetal Heart Rate: 140 BPM  FHR Category: Category I          Notes/comments:   Pt had SROM foul-smelling blood-tinged fluid  Afebrile  BS WNL since arrival X 3  Pt chart reviewed  Has presumptive dx of A2GDM based upon a FBS of 95 during her 3 hr GTT  She has not had abnormal BS values during outpt monitoring  Fetal growth >95th%ile 3 weeks ago with EFW ~3700gms at that time; [facility] >95th%ile also  EFW 9lbs by Leopold's  Pt and FOB both >9lbs at birth  Discussed at length w/ pt and  current management concerns:  1  Will treat as chorioamnionitis given foul-smelling amniotic fluid  2  Reviewed increased risk of adverse pregnancy outcome and labor abnormalities given fetal macrosomia, elevated maternal BMI, increased fetal AC  Pt and  given option of proceeding to C/S at this time  This option has not been discussed with the couple in her current pregnancy context prior to this AM   We have agreed to have low threshold for C/S delivery if any signs of fetal/maternal compromise or labor abnormality  Pt given option to request primary C/S at any time going forward  Challenge of predicting shoulder dystocia discussed  Patient has elevated risk given multiple factors  Patient informed of potential for neurologic damage to the fetus if shoulder dystocia encountered  Will follow progress            Brian Zapien DO 2018 6:02 AM

## 2018-08-30 NOTE — ANESTHESIA PROCEDURE NOTES
Epidural Block    Patient location during procedure: OB  Start time: 8/30/2018 3:12 PM  Reason for block: at surgeon's request and primary anesthetic  Staffing  Anesthesiologist: Gregor Sparks  Performed: anesthesiologist   Preanesthetic Checklist  Completed: patient identified, site marked, surgical consent, pre-op evaluation, timeout performed, IV checked, risks and benefits discussed and monitors and equipment checked  Epidural  Patient position: sitting  Prep: Betadine and site prepped and draped  Patient monitoring: frequent blood pressure checks, continuous pulse ox and heart rate  Approach: midline  Location: lumbar (1-5)  Injection technique: ALWSON saline  Needle  Needle type: Tuohy   Needle gauge: 18 G  Catheter type: side hole  Catheter size: 20 G  Catheter at skin depth: 11 cm  Test dose: negative and lidocaine 1 5% with epinephrine 1-to-200,000  Assessment  Sensory level: [id number] aspiration for CSF, negative aspiration for heme and no paresthesia on injection  patient tolerated the procedure well with no immediate complications

## 2018-08-30 NOTE — OB LABOR/OXYTOCIN SAFETY PROGRESS
Oxytocin Safety Progress Check Note - Jodi Baker 29 y o  female MRN: [id number]    Unit/Bed#: -01 Encounter: [id number]    Obstetric History       T0      L0     SAB0   TAB0   Ectopic0   Multiple0   Live Births0    Obstetric Comments   GDM-diet control, asthma     Gestational Age: 39w1d     Contraction Frequency (minutes): 4-5  Contraction Quality: Moderate  Tachysystole: No   Dilation: 4        Effacement (%): 80  Station: -1  Baseline Rate: 140 bpm  Fetal Heart Rate: 150 BPM  FHR Category: Category I          Notes/comments:   Patient has better pain control with her epidural  Will begin pitocin and titrate for adequate contraction pattern and strength  Discussed with Dr Maralee Peers Kathlen Litten, MD 2018 4:39 PM

## 2018-08-30 NOTE — ANESTHESIA PROCEDURE NOTES
Epidural Block    Patient location during procedure: OB  Start time: 8/30/2018 10:45 AM  Reason for block: primary anesthetic  Staffing  Anesthesiologist: Karen Richards  Performed: anesthesiologist   Preanesthetic Checklist  Completed: patient identified, surgical consent, pre-op evaluation, timeout performed, IV checked, risks and benefits discussed and monitors and equipment checked  Epidural  Patient position: sitting  Prep: Betadine  Patient monitoring: continuous pulse ox and frequent blood pressure checks  Approach: midline  Location: lumbar (1-5)  Injection technique: LAWSON saline  Needle  Needle type: Tuohy   Needle gauge: 18 G  Catheter size: 20 G  Catheter at skin depth: 13 cm  Test dose: negative and lidocaine 1 5% with epinephrine 1-to-200,000negative aspiration for CSF, negative aspiration for heme and no paresthesia on injection  patient tolerated the procedure well with no immediate complications

## 2018-08-30 NOTE — OB LABOR/OXYTOCIN SAFETY PROGRESS
Labor Progress Note - Richard Louis 29 y o  female MRN: [id number]    Unit/Bed#: -01 Encounter: [id number]    Obstetric History       T0      L0     SAB0   TAB0   Ectopic0   Multiple0   Live Births0    Obstetric Comments   GDM-diet control, asthma     Gestational Age: 39w1d     Contraction Frequency (minutes): 2-5  Contraction Quality: Moderate  Tachysystole: No   Dilation: 4        Effacement (%): 80  Station: -2  Baseline Rate: 145 bpm  Fetal Heart Rate: 152 BPM  FHR Category: Category I          Notes/comments:   Patient is feeling well  She has made significant cervical change since her last exam  She states that her contractions are getting stronger  Discussed epidural with patient and she will request when needed  Discussed with Dr Elder Porter MD 2018 9:59 AM

## 2018-08-30 NOTE — ANESTHESIA PREPROCEDURE EVALUATION
Review of Systems/Medical History  Patient summary reviewed  Chart reviewed  No history of anesthetic complications     Cardiovascular  Negative cardio ROS    Pulmonary  Asthma , well controlled/ stable ,        GI/Hepatic  Negative GI/hepatic ROS          Negative  ROS        Endo/Other  Diabetes gestational Diet controlled,   Obesity  morbid obesity   GYN  , Prior pregnancy/OB history ,          Hematology  Negative hematology ROS      Musculoskeletal  Negative musculoskeletal ROS        Neurology  Negative neurology ROS      Psychology   Negative psychology ROS              Physical Exam    Airway    Mallampati score: I  TM Distance: >3 FB  Neck ROM: full     Dental   No notable dental hx     Cardiovascular  Comment: Negative ROS,     Pulmonary      Other Findings        Anesthesia Plan  ASA Score- 3     Anesthesia Type- epidural with ASA Monitors  Additional Monitors:   Airway Plan:         Plan Factors-  Patient did not smoke on day of surgery  Induction-     Postoperative Plan-     Informed Consent- Anesthetic plan and risks discussed with patient

## 2018-08-30 NOTE — OB LABOR/OXYTOCIN SAFETY PROGRESS
Oxytocin Safety Progress Check Note - Philomena Vega 29 y o  female MRN: [id number]    Unit/Bed#: -01 Encounter: [id number]    Obstetric History       T0      L0     SAB0   TAB0   Ectopic0   Multiple0   Live Births0    Obstetric Comments   GDM-diet control, asthma     Gestational Age: 39w1d  Dose (zaida-units/min) Oxytocin: 12 zaida-units/min  Contraction Frequency (minutes): 2 5-3 5  Contraction Quality: Moderate  Tachysystole: No   Dilation: 9        Effacement (%): 100  Station: 1  Baseline Rate: 140 bpm  Fetal Heart Rate: 142 BPM  FHR Category: Category II     Oxytocin Safety Progress Check: Safety check completed    Notes/comments:       Patient doing well  Excellent cervical change noted            Rene Nino MD 2018 7:56 PM

## 2018-08-30 NOTE — H&P
History & Physical - OB/GYN   Lilliam Pandey 29 y o  female MRN: [id number]  Unit/Bed#: -01 Encounter: [id number]      Chief complaint: I'm here to be induced     HPI: Lilliam Pandey is a 29 y o  [facility] Mica at 39w1d by LMP on 2017 who presents for induction of labor for A1gDM  The patient was diagnosed with A1gDM by a fasting blood glucose of 95  She has controlled her glucose with diet  She has a known history of asthma  She was diagnosed with Polyhydramnios of 24 9cm  Contractions:  yes  Fetal movement:  yes  Vaginal bleeding:   no  Leaking of fluid:  no    She is a patient of [facility]    Pregnancy Complications:  Patient Active Problem List   Diagnosis    Allergic rhinitis    Moderate persistent asthma    39 weeks gestation of pregnancy    Maternal morbid obesity in third trimester, antepartum ([address] )    Encounter for supervision of normal first pregnancy in third trimester    Gestational diabetes mellitus (GDM)       PMH:  Past Medical History:   Diagnosis Date    Asthma     Diabetes mellitus ([facility] [address] )     gestational DM diet controlled    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    only got  sperm tested, naturally conceived     Lumbar strain 2015    Varicella     chicken pox as child    Visual impairment     eyewear        PSH:  Past Surgical History:   Procedure Laterality Date    FOOT SURGERY      WISDOM TOOTH EXTRACTION         Social Hx:  Smoking: none  Alcohol: none  Illicit drugs: none     OB Hx:  Obstetric History       T0      L0     SAB0   TAB0   Ectopic0   Multiple0   Live Births0       # Outcome Date GA Lbr Genaro/2nd Weight Sex Delivery Anes PTL Lv   1 Current               Obstetric Comments   GDM-diet control, asthma       Meds:  No current facility-administered medications on file prior to encounter        Current Outpatient Prescriptions on File Prior to Encounter   Medication Sig Dispense Refill    albuterol (VENTOLIN HFA) 90 mcg/act inhaler Inhale 2 puffs every 4 (four) hours as needed        docusate sodium (COLACE) 100 mg capsule Take 100 mg by mouth daily as needed for constipation      fluticasone furoate-vilanterol (BREO ELLIPTA) 200-25 MCG/INH inhaler Inhale 1 puff daily        ONETOUCH DELICA LANCETS 87T MISC Test 4 times daily  DX-diet controlled gestational diabetes  100 each 3    ONETOUCH VERIO test strip Test 4 times daily DX-Diet controlled gestational diabetes  150 each 3    Prenatal Multivit-Min-Fe-FA (PRENATAL VITAMINS PO) Take 1 tablet by mouth daily           Allergies: Allergies   Allergen Reactions    Sulfa Antibiotics Other (See Comments)     Pt does not know what happens when she takes was a child when had the allergy       Labs:   Blood type: A+  Antibody: negative  Group B strep: positive  HIV: negative  Hepatitis B: negative  RPR: NR  Rubella: Immune  Varicella Immune  1 hour Glucose: 145    Physical Exam:  /69   Pulse 73   Temp 98 2 °F (36 8 °C) (Axillary)   Resp 20   Ht 5' 7" (1 702 m)   Wt 122 kg (270 lb)   LMP 11/29/2017 (Exact Date)   BMI 42 29 kg/m²     Physical Exam       Estimated Fetal Weight: 10 5 lbs  Presentation: vertex by US     SVE: 1 / 60% / -3  FHT:  Category 1: 150 / Moderate 6 - 25 bpm / accelerations, no decelerations   [place]in: irregular    Membranes: intact     Assessment:   Alyssia Jacques is a 29 y o  [facility] at 39w1d by LMP on 11/29/2017 who presents for induction of labor for A1gDM  The patient was diagnosed with gDM by a fasting blood glucose of 95  She has controlled her glucose with diet  Plan:   1  Admit to L&D  2  CBC, RPR, type and screen  3  Asthma: Continue home Breo and Albuterol   4  Patient desires pain management: epidural upon request  5  FEN: Clear liquid diet and NS @ 125mL/hour    Discussed with Dr Canales Mail

## 2018-08-30 NOTE — OB LABOR/OXYTOCIN SAFETY PROGRESS
Labor Progress Note - Jens Castro 29 y o  female MRN: [id number]    Unit/Bed#: -01 Encounter: [id number]    Obstetric History       T0      L0     SAB0   TAB0   Ectopic0   Multiple0   Live Births0    Obstetric Comments   GDM-diet control, asthma     Gestational Age: 39w1d     Contraction Frequency (minutes):  (Difficult tracing)  Contraction Quality: Moderate  Tachysystole: No   Dilation: 4        Effacement (%): 80  Station: -1  Baseline Rate: 135 bpm  Fetal Heart Rate: 148 BPM  FHR Category: Category I          Notes/comments:   Patient is uncomfortable with epidural  Will ask anesthesia to reevaluate her pain control  Cervix is unchanged from her previous exam  Cervix to maternal left  Plan to begin pitocin after patient has better pain control  Discussed with Dr Bola Dickens MD 2018 3:04 PM

## 2018-08-31 PROCEDURE — 88307 TISSUE EXAM BY PATHOLOGIST: CPT | Performed by: PATHOLOGY

## 2018-08-31 PROCEDURE — 59400 OBSTETRICAL CARE: CPT | Performed by: OBSTETRICS & GYNECOLOGY

## 2018-08-31 RX ORDER — FLUTICASONE FUROATE AND VILANTEROL 100; 25 UG/1; UG/1
1 POWDER RESPIRATORY (INHALATION) DAILY
Status: DISCONTINUED | OUTPATIENT
Start: 2018-08-31 | End: 2018-09-02 | Stop reason: HOSPADM

## 2018-08-31 RX ORDER — ALBUTEROL SULFATE 90 UG/1
2 AEROSOL, METERED RESPIRATORY (INHALATION) EVERY 4 HOURS PRN
Status: DISCONTINUED | OUTPATIENT
Start: 2018-08-31 | End: 2018-09-02 | Stop reason: HOSPADM

## 2018-08-31 RX ADMIN — ACETAMINOPHEN 650 MG: 325 TABLET, FILM COATED ORAL at 09:24

## 2018-08-31 RX ADMIN — ACETAMINOPHEN 650 MG: 325 TABLET, FILM COATED ORAL at 02:54

## 2018-08-31 RX ADMIN — SENNOSIDES 8.6 MG: 8.6 TABLET, FILM COATED ORAL at 09:25

## 2018-08-31 RX ADMIN — WITCH HAZEL 1 PAD: 500 SOLUTION RECTAL; TOPICAL at 00:39

## 2018-08-31 RX ADMIN — IBUPROFEN 600 MG: 600 TABLET ORAL at 16:00

## 2018-08-31 RX ADMIN — HYDROCORTISONE 1 APPLICATION: 1 CREAM TOPICAL at 00:39

## 2018-08-31 RX ADMIN — IBUPROFEN 600 MG: 600 TABLET ORAL at 06:47

## 2018-08-31 RX ADMIN — DOCUSATE SODIUM 100 MG: 100 CAPSULE, LIQUID FILLED ORAL at 18:37

## 2018-08-31 RX ADMIN — DOCUSATE SODIUM 100 MG: 100 CAPSULE, LIQUID FILLED ORAL at 09:25

## 2018-08-31 RX ADMIN — Medication 62.5 MILLI-UNITS/MIN: at 00:01

## 2018-08-31 RX ADMIN — FLUTICASONE FUROATE AND VILANTEROL TRIFENATATE 1 PUFF: 100; 25 POWDER RESPIRATORY (INHALATION) at 09:26

## 2018-08-31 RX ADMIN — BENZOCAINE AND LEVOMENTHOL: 200; 5 SPRAY TOPICAL at 00:39

## 2018-08-31 RX ADMIN — IBUPROFEN 600 MG: 600 TABLET ORAL at 00:39

## 2018-08-31 NOTE — PLAN OF CARE
DISCHARGE PLANNING     Discharge to home or other facility with appropriate resources Progressing        Knowledge Deficit     Patient/family/caregiver demonstrates understanding of disease process, treatment plan, medications, and discharge instructions Progressing     Verbalizes understanding of labor plan Progressing        POSTPARTUM     Experiences normal postpartum course Progressing     Appropriate maternal -  bonding Progressing     Establishment of infant feeding pattern Progressing     Incision(s), wounds(s) or drain site(s) healing without S/S of infection Progressing

## 2018-08-31 NOTE — PLAN OF CARE
INFECTION - ADULT     Absence or prevention of progression during hospitalization Completed     Absence of fever/infection during neutropenic period Completed        Labor & Delivery     Manages discomfort Completed     Patient vital signs are stable Completed        PAIN - ADULT     Verbalizes/displays adequate comfort level or baseline comfort level Completed        SAFETY ADULT     Patient will remain free of falls Completed     Maintain or return to baseline ADL function Completed     Maintain or return mobility status to optimal level Completed

## 2018-08-31 NOTE — PROGRESS NOTES
Progress Note - OB/GYN   So Louie 29 y o  female MRN: [id number]  Unit/Bed#: -01 Encounter: [id number]    So Louie is a patient of [facility]    Assessment:  Post partum day #1 () s/p , stable, and doing well    Plan:  1  A1GDM   - F/u 2hr 75g OGTT at 6-12w pp  2  Asthma   - Continue ventolin  3  Continue routine post partum care   - Encourage ambulation   - Encourage breastfeeding  4  Continue current meds    - See list below   - Pain controlled  5  Disposition   - Stable, vitals WNL   - Anticipate discharge home tomorrow      Subjective/Objective     Chief Complaint:     Post partum day 1 from a  with no complaints today    Subjective:   Pain: no  Tolerating Oral Intake: yes  Voiding: yes  Flatus: yes  Bowel Movement: no  Ambulating: yes  Breastfeeding: Breastfeeding  Chest Pain: no  Shortness of Breath: no  Leg Pain/Discomfort: no  Lochia: minimal    Objective:   Vitals: /61 (BP Location: Right arm)   Pulse 71   Temp 99 °F (37 2 °C) (Oral)   Resp 18   Ht 5' 7" (1 702 m)   Wt 122 kg (270 lb)   LMP 2017 (Exact Date)   SpO2 97%   Breastfeeding?  Yes   BMI 42 29 kg/m²       Intake/Output Summary (Last 24 hours) at 18 0637  Last data filed at 18 7239   Gross per 24 hour   Intake          4098 62 ml   Output             1425 ml   Net          2673 62 ml       Lab Results   Component Value Date    WBC 9 99 2018    HGB 9 7 (L) 2018    HCT 29 4 (L) 2018    MCV 90 2018     2018       Meds/Allergies   Current Facility-Administered Medications   Medication Dose Route Frequency    acetaminophen (TYLENOL) tablet 650 mg  650 mg Oral Q6H PRN    albuterol (PROVENTIL HFA,VENTOLIN HFA) inhaler 2 puff  2 puff Inhalation Q4H PRN    benzocaine-menthol-lanolin-aloe (DERMOPLAST) 20-0 5 % topical spray   Topical 4x Daily PRN    calcium carbonate (TUMS) chewable tablet 1,000 mg  1,000 mg Oral Daily PRN    docusate sodium (COLACE) capsule 100 mg  100 mg Oral BID    fluticasone-vilanterol (BREO ELLIPTA) 100-25 mcg/inh inhaler 1 puff  1 puff Inhalation Daily    hydrocortisone 1 % cream 1 application  1 application Topical PRN    ibuprofen (MOTRIN) tablet 600 mg  600 mg Oral Q6H PRN    ondansetron (ZOFRAN) injection 4 mg  4 mg Intravenous Q8H PRN    oxytocin (PITOCIN) 30 Units in lactated ringers 500 mL infusion  62 5 zaida-units/min Intravenous Continuous    senna (SENOKOT) tablet 8 6 mg  1 tablet Oral Daily    witch hazel-glycerin (TUCKS) topical pad 1 pad  1 pad Topical PRN       Physical Exam:  General: in no apparent distress and well developed and well nourished  Cardiovascular: Cor RRR  Lungs: clear to auscultation bilaterally  Abdomen: abdomen is soft without significant tenderness, masses, organomegaly or guarding  Fundus: Firm and non-tender, 1 cm below the umbilicus  Lower extremeties: nontender    Curtis Dior DO  PGY-2 OB/GYN   8/31/2018 6:37 AM

## 2018-08-31 NOTE — DISCHARGE INSTRUCTIONS
Breastfeeding and Breast Engorgement   WHAT YOU NEED TO KNOW:   What do I need to know about breast engorgement? Breast engorgement develops when too much milk builds up in your breast  It is normal for your breasts to feel swollen, heavy, and tender when your milk comes in  This is called breast fullness  When your breast starts to feel painful and hard, the fullness has developed into engorgement  Breast engorgement usually happens 3 to 5 days after you give birth  Engorgement can happen if you are not breastfeeding or expressing milk often, or produce a lot of milk  Your baby may have a hard time latching on (attaching) to your breast to feed  Without treatment, engorgement can lead to plugged milk ducts or a breast infection called mastitis  What are the signs and symptoms of breast engorgement? · A swollen, tender breast    · A breast that feels hard to the touch or looks tight or shiny    · Warm, red, or throbbing breast    · Flat nipple    · A low fever  How can I manage my symptoms? · Breastfeed or pump every 2 or 3 hours  Frequent breastfeeding helps decrease engorgement discomfort  Express or pump milk from your breasts before you breastfeed  This will help soften your breast and your nipple, and allow your baby to latch on better  · Empty your breasts completely  Take your time when you breastfeed to allow your baby to empty your breast  Try not to switch breasts too early  Express or pump after you breastfeed if your baby is not emptying your breasts when he feeds  · Massage your breast   Breast massage helps empty your engorged breast and decrease pain  Gently massage your breast before and during breastfeeding to help increase your milk flow  Gently stroke your breast, starting from the outer areas and working your way toward the nipple  Breast massage may also help prevent breast engorgement if done in the first few days after you give birth      · Apply a cool compress in between feedings  The cold may help decrease swelling and pain in your engorged breast  Wet a washcloth in cold water, wring it out, and place it on your breast  Ask how long and how often to use a cool compress  · Wear a supportive bra  The bra should fit well but not be too tight  · Apply warmth to your breast before you breastfeed  Put a warm, wet cloth on your breast or take a warm shower  This can help increase your milk flow  When should I seek immediate care? · You have a fever with chills or body aches  · You have pain and swelling in one or both breasts that keeps you from breastfeeding  When should I contact my healthcare provider? · You have a tender breast lump that grows slowly and usually forms on one side of your breast     · You have a small, white bump on your nipple  · Your symptoms do not get better within 24 hours  · You have questions or concerns about your condition or care  Where can I get more information? · [employer] [address]   [address] [place] [address]  Phone: 2- 977 - [phone]  Web Address: http://[employer]/  · [address]  Phone: 6- 682 - 409-2043  Phone: 6- 950 - [phone]  Web Address: http://www [facility]/  org  CARE AGREEMENT:   You have the right to help plan your care  Learn about your health condition and how it may be treated  Discuss treatment options with your caregivers to decide what care you want to receive  You always have the right to refuse treatment  The above information is an  only  It is not intended as medical advice for individual conditions or treatments  Talk to your doctor, nurse or pharmacist before following any medical regimen to see if it is safe and effective for you    © 2017 [employer] Information is for End User's use only and may not be sold, redistributed or otherwise used for commercial purposes  All illustrations and images included in CareNotes® are the copyrighted property of A D A M , Inc  or Michael Waller  Postpartum Bleeding   WHAT YOU NEED TO KNOW:   What is postpartum bleeding? Postpartum bleeding is vaginal bleeding after childbirth  This bleeding is normal, whether your baby was born vaginally or by   It contains blood and the tissue that lined the inside of your uterus when you were pregnant  What should I expect with postpartum bleeding? Postpartum bleeding usually lasts at least 10 days, and may last longer than 6 weeks  Your bleeding may range from light (barely staining a pad) to heavy (soaking a pad in 1 hour)  Usually, you have heavier bleeding right after childbirth, which slows over the next few weeks until it stops  The bleeding is red or dark brown with clots for the first 1 to 3 days  It then turns pink for several days, and then becomes a white or yellow discharge until it ends  When should I contact my healthcare provider? · Your bleeding increases, or you have heavy bleeding that soaks a pad in 1 hour for 2 hours in a row  · You pass large blood clots  · You are breathing faster than normal, or your heart is beating faster than normal     · You are urinating less than usual, or not at all  · You feel dizzy  · You have questions or concerns about your condition or care  When should I seek immediate care or call 911? · You are suddenly short of breath and feel lightheaded  · You have sudden chest pain  CARE AGREEMENT:   You have the right to help plan your care  Learn about your health condition and how it may be treated  Discuss treatment options with your caregivers to decide what care you want to receive  You always have the right to refuse treatment  The above information is an  only  It is not intended as medical advice for individual conditions or treatments   Talk to your doctor, nurse or pharmacist before following any medical regimen to see if it is safe and effective for you  © 2017 [facility] Information is for End User's use only and may not be sold, redistributed or otherwise used for commercial purposes  All illustrations and images included in CareNotes® are the copyrighted property of A D A M , Inc  or Michael Waller

## 2018-08-31 NOTE — SOCIAL WORK
Breast pump consult  Per pt request, Spectra pump ordered from [facility] via [facility] for room delivery today in time for d/c tomorrow  No other CM needs noted

## 2018-08-31 NOTE — L&D DELIVERY NOTE
Delivery Summary - OB/GYN   Kori Huerta 29 y o  female MRN: [id number]  Unit/Bed#: -01 Encounter: [id number]    Pre-delivery Diagnosis:   1  39w1d pregnancy  2  A1GDM  3  Polyhydramnios     Post-delivery Diagnosis: same, delivered    Attending: Antony Walters    Assistant(s): Gera     Procedure: Spontaneous vaginal delivery, second degree laceration reapir    Anesthesia: epidural    Estimated Blood Loss:  300 mL    Specimens:   1  Arterial and venous cord gases  2  Cord blood  3  Segment of umbilical cord  4  Placenta to pathology    Complications:  None apparent    Findings:  1  Viable male  delivered on 2018 at 2230 weighing 9lbs 2oz;  Apgar scores of 8 at one minute and 9 at five minutes  2  Spontaneous delivery of placenta with centrally inserted 3-vessel cord  3  2nd degree perineal laceration, repaired with 3-0Vicryl       Disposition: Patient tolerated the procedure well and was recovering in labor and delivery room with family and  before being transferred to the post-partum floor  Procedure Details     Description of procedure    After pushing for five minutes, on 18 at 22:30 patient delivered a viable male , weighing 4139g, Apgars of 8 (1 min) and 9 (5 min)  The fetal vertex delivered spontaneously  There was a loose nuchal cord that was reduced over the head  The left anterior shoulder delivered atraumatically with maternal expulsive forces and the assistance of gentle downward traction  The right posterior shoulder delivered with maternal expulsive forces and the assistance of upward traction  The remainder of the fetus delivered spontaneously  Upon delivery, the infant was placed on the mothers abdomen and the cord was clamped and cut  The infant was noted to cry spontaneously and was moving all extremities appropriately  There was no evidence for injury  Awaiting nurse resuscitators evaluated the  at bedside   Arterial and venous cord blood gases and cord blood was collected for analysis  These were promptly sent to the lab  In the immediate post-partum, 30 units of IV pitocin was administered and the uterus was noted to contract down well with massage and pitocin  The placenta delivered spontaneously at 2237 and was noted to have a centrally inserted 3 vessel cord  The vagina, cervix, and perineum were inspected and there was noted to be second degree perineal laceration  Laceration Repair  Patient was comfortable with epidural at that time  A second degree perineal laceration was identified and required repair  Laceration was repaired with 3-0 Vicryl in running fashion to reapproximate the laceration  Good hemostasis was confirmed at the conclusion of this procedure  At the conclusion of the delivery, all needle, sponge, and instrument counts were noted to be correct  Patient tolerated the procedure well and was allowed to recover in labor and delivery room with family and  before being transferred to the post-partum floor  Dr Susan Castillo was present and participated in all key portions of the case      Adrian Diaz  OB/Gyn, PGY-1  2018  3:17 AM

## 2018-08-31 NOTE — ANESTHESIA POSTPROCEDURE EVALUATION
Post-Op Assessment Note      CV Status:  Stable    Mental Status:  Alert and awake    Hydration Status:  Euvolemic    PONV Controlled:  None    Airway Patency:  Patent    Post Op Vitals Reviewed: Yes          Staff: Anesthesiologist     Post-op block assessment: catheter intact, site cleaned and no complications        BP      Temp      Pulse     Resp      SpO2

## 2018-08-31 NOTE — PLAN OF CARE
DISCHARGE PLANNING     Discharge to home or other facility with appropriate resources Completed        Knowledge Deficit     Patient/family/caregiver demonstrates understanding of disease process, treatment plan, medications, and discharge instructions Completed     Verbalizes understanding of labor plan Completed        POSTPARTUM     Experiences normal postpartum course Completed     Appropriate maternal -  bonding Completed     Establishment of infant feeding pattern Completed     Incision(s), wounds(s) or drain site(s) healing without S/S of infection Completed

## 2018-08-31 NOTE — DISCHARGE SUMMARY
**LATE ENTRY** Discharge Summary - Lori Alberts 29 y o  female MRN: [id number]    Unit/Bed#: -01 Encounter: [id number]    Admission Date: 2018     Discharge Date: 2018    Admitting Diagnosis:   1  IU Pat 39w1d  2  IOL for A1GDM  3  Asthma  4  Polydramnios    Discharge Diagnosis:   Same, delivered    Procedures:   spontaneous vaginal delivery    Admitting Attending: Dr Sushil Mayberry  Delivery Attending: Ryan Pace MD    Hospital Course:     Lori Alberts is a 29 y o  Balinda Forester who was admitted at 36w3d for IOL for A1GDM  She was induced with pitocin and made continuous cervical change and received an epidural for pain control  She was AROM'd for clear moderate fluid at 0354  She proceeded to make cervical change and became complete at 2221  She started pushing at 2225  She then underwent an uncomplicated spontaneous vaginal delivery and delivered a viable male  at 65  APGARS were 8, 9 at 1 and 5 minutes, respectively   weighed 9lb 2oz  Placenta was delivered at 2237   was then transferred to  nursery  Patient tolerated the procedure well and was transferred to recovery in stable condition  The patient's post partum course was unremarkable  On day of discharge, she was ambulating and able to reasonably perform all ADLs  She was voiding and had appropriate bowel function  Pain was well controlled  She was discharged home on postpartum day #2 without complications  Patient was instructed to follow up with her OB as an outpatient and was given appropriate warnings to call provider if she develops signs of infection or uncontrolled pain  On day of discharge she was ambulating, voiding spontaneously, tolerating oral intake and hemodynamically stable  She is breast feeding   Mom's blood type is A positive  Rhogam is not indicated      Condition at discharge:   good     Disposition:   Home    Planned Readmission:   No    Discharge Medications:   Prenatal vitamin daily for 6 months or the duration of nursing whichever is longer  Motrin 600 mg orally every 6 hours as needed for pain  Tylenol (over the counter) per bottle directions as needed for pain  Hydrocortisone cream 1% (over the counter) applied 1-2x daily to hemorrhoids as needed  Witch hazel pads for hemorrhoidal discomfort as needed    Discharge instructions :   -Do not place anything (no partner, tampons or douche) in your vagina for 6 weeks  -You may walk for exercise for the first 6 weeks then gradually return to your usual activities    -Please do not drive for 1 week if you have no stitches and for 2 weeks if you have stitches or underwent a  delivery     -You may take baths or shower per your preference    -Please look at your bust (breasts) in the mirror daily and call provider for redness or tenderness or increased warmth  - If you have had a  please look at your incision daily as well and call provider for increasing redness or steady drainage from the incision    -Please call your provider if temperature > 100 4*F or 38* C, worsening pain or a foul discharge      Kelly Scruggs, DO  PGY-2 OB/GYN   9/3/2018 6:11 AM

## 2018-09-01 VITALS
HEART RATE: 76 BPM | OXYGEN SATURATION: 98 % | HEIGHT: 67 IN | SYSTOLIC BLOOD PRESSURE: 116 MMHG | BODY MASS INDEX: 42.38 KG/M2 | TEMPERATURE: 97.9 F | DIASTOLIC BLOOD PRESSURE: 66 MMHG | WEIGHT: 270 LBS | RESPIRATION RATE: 18 BRPM

## 2018-09-01 RX ADMIN — DOCUSATE SODIUM 100 MG: 100 CAPSULE, LIQUID FILLED ORAL at 18:28

## 2018-09-01 RX ADMIN — DOCUSATE SODIUM 100 MG: 100 CAPSULE, LIQUID FILLED ORAL at 08:57

## 2018-09-01 RX ADMIN — ACETAMINOPHEN 650 MG: 325 TABLET, FILM COATED ORAL at 22:53

## 2018-09-01 RX ADMIN — SENNOSIDES 8.6 MG: 8.6 TABLET, FILM COATED ORAL at 08:56

## 2018-09-01 RX ADMIN — IBUPROFEN 600 MG: 600 TABLET ORAL at 08:56

## 2018-09-01 RX ADMIN — FLUTICASONE FUROATE AND VILANTEROL TRIFENATATE 1 PUFF: 100; 25 POWDER RESPIRATORY (INHALATION) at 09:10

## 2018-09-01 RX ADMIN — BENZOCAINE AND LEVOMENTHOL: 200; 5 SPRAY TOPICAL at 12:15

## 2018-09-01 RX ADMIN — ACETAMINOPHEN 650 MG: 325 TABLET, FILM COATED ORAL at 04:27

## 2018-09-01 NOTE — LACTATION NOTE
This note was copied from a baby's chart  Infant now out from bili lights  Is to continue to supplement  Infant assisted to breast in football hold with SNS in place  Infant sleepy  Demonstrated alerting techniques  Infant did latch on with assist  Reviewed signs of good positioning and latch  Encouraged mom to continue to pump after each feeding until milk supply is established while infant is receiving supplements  Encouraged her to call for additional assistance as needed

## 2018-09-01 NOTE — LACTATION NOTE
This note was copied from a baby's chart  Assisted infant at breast in football hold with SNS in place  Infant often slips off nipple and sucks on tube or his lips  Discussed mom doing some finger feeding with SNS for suck training  Encouraged to pump after each feeding attempt to stimulate breasts

## 2018-09-04 ENCOUNTER — TELEPHONE (OUTPATIENT)
Dept: OBGYN CLINIC | Facility: CLINIC | Age: 33
End: 2018-09-04

## 2018-09-04 NOTE — TELEPHONE ENCOUNTER
Pt called c/o edema of feet and legs which she describes as pitting    No bp problems during pregnancy    Reassured     Adv to increase fluids, elevate legs  And cb with any further questions

## 2018-09-06 NOTE — CASE MANAGEMENT
Notification of Maternity Inpatient Admission/Maternity Inpatient Authorization Request  This is a Notification of Maternity Inpatient Admission/Maternity Inpatient Authorization Request to our facility [facility]  Please be advised that this patient is currently in our facility under Inpatient Status  Below you will find the Birth/ Summary, Attending Physician and Facilitys information including NPI#  and contact information for the Utilization Review Department where the patient is receiving care services  Place of Service Code: 24   Place of Service Name: Inpatient Hospital  Presentation Date & Time: 2018  7:52 PM  Inpatient Admission Date & Time: 18  Discharge Date & Time: 2018 11:05 PM   Discharge Disposition (if discharged): Home/Self Care  Attending Physician & NPI: Natasha Ruiz Md [[id number]]  Mother's Admitting Diagnosis: Encounter for full-term uncomplicated delivery [Y62]  44 weeks gestation of pregnancy [Z3A 39]  Type of Delivery: Diagnosis: [de-identified] VAGINAL DELIVERY  Estimated Date of Delivery: 18    Mother of  Information: Cuauhtemoc Hernandez   MRN: [id number] YOB: 1985     Information:   Information for the patient's :  Radha Douglass [[id number]]   Sarepta Delivery Information:  Sex: male  Delivered 2018 10:30 PM by Vaginal, Spontaneous Delivery; Gestational Age: 36w3d  Sarepta Measurements:  Weight: 9 lb 2 oz (4139 g);   Height: 20 5"  APGAR 1 minute 5 minutes 10 minutes   Totals: 8 9      OB History      Para Term  AB Living    1 1 1 0 0 1    SAB TAB Ectopic Multiple Live Births    0 0 0 0 1        Obstetric Comments    GDM-diet control, asthma      Facility: [facility])  Address: [address] [place] [address]  Phone: [phone] Tax ID: [id number]  NPI: [id number]  Medicare ID: [id number]  Thank you,  [address] [employer] [facility]  Network Utilization Review Department  Phone: [phone]; Fax [phone]  ATTENTION: Please call with any questions or concerns to [phone]  and carefully follow the prompts so that you are directed to the right person  Send all requests for admission clinical reviews, approved or denied determinations and any other requests to fax [phone]   All voicemails are confidential

## 2018-09-07 ENCOUNTER — OFFICE VISIT (OUTPATIENT)
Dept: POSTPARTUM | Facility: CLINIC | Age: 33
End: 2018-09-07
Payer: COMMERCIAL

## 2018-09-07 DIAGNOSIS — O92.70 LACTATION PROBLEM: ICD-10-CM

## 2018-09-07 DIAGNOSIS — Z71.89 ENCOUNTER FOR BREAST FEEDING COUNSELING: Primary | ICD-10-CM

## 2018-09-07 PROCEDURE — 99404 PREV MED CNSL INDIV APPRX 60: CPT | Performed by: PEDIATRICS

## 2018-09-07 NOTE — PROGRESS NOTES
INITIAL BREAST FEEDING EVALUATION    Informant/Relationship: Sandrita    Discussion of General Lactation Issues: Jackie Martínez has needed supplementation since he was just a few hours old  Latch has also been a struggle  Supplementation continues due to first jaundice and now concerns with weight  Elaine Lala is unable to express as much milk as her baby needs  Currently Peds has instructed to Elaine Lala to pump prior to feed to know exactly how much baby is eating then to supplement with formula, then offer the breast   Jackie Martínez has pretty much been comfort sucking and not actually feeding at the breast with this approach  Infant is 6days old today   History:  Fertility Problem:yes - took quite some time to acheive pregnancy  Some testing done but no interventions  Breast changes:yes - areola got larger and darker  : yes - induced due to concerns about GDM  Full term:yes - 39 1/7 weeks   labor:no  First nursing/attempt < 1 hour after birth:no  Skin to skin following delivery:yes - interrupted briefly to stabilize baby  Breast changes after delivery:no  Rooming in (infant in room with mother with exception of procedures, eg  Circumcision: Went to the nursery for phototherapy  Blood sugar issues:yes - very soon after delivery  NICU stay:no  Jaundice:yes - second day  Phototherapy:yes - for 12 hours  Supplement given: (list supplement and method used as well as reason(s):yes - formula via bottle  Hand expression not discussed    Parents refused donor milk    Past Medical History:   Diagnosis Date    Asthma     had vaccine     Diabetes mellitus ([facility] [address] )     gestational DM diet controlled    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    only got  sperm tested, naturally conceived     Lumbar strain 2015    Varicella     chicken pox as child    Visual impairment     eyewear          Current Outpatient Prescriptions:     albuterol (VENTOLIN HFA) 90 mcg/act inhaler, Inhale 2 puffs every 4 (four) hours as needed , Disp: , Rfl:     docusate sodium (COLACE) 100 mg capsule, Take 100 mg by mouth daily as needed for constipation, Disp: , Rfl:     fluticasone furoate-vilanterol (BREO ELLIPTA) 200-25 MCG/INH inhaler, Inhale 1 puff daily  , Disp: , Rfl:     ONETOUCH DELICA LANCETS 70V MISC, Test 4 times daily  DX-diet controlled gestational diabetes  , Disp: 100 each, Rfl: 3    Prenatal Multivit-Min-Fe-FA (PRENATAL VITAMINS PO), Take 1 tablet by mouth daily  , Disp: , Rfl:     Allergies   Allergen Reactions    Sulfa Antibiotics Other (See Comments)     Pt does not know what happens when she takes was a child when had the allergy       History   Drug Use No       Social History Former smoker    Interval Breastfeeding History:    Frequency of breast feeding: Attempting several times a day after bottle feeding  Does mother feel breastfeeding is effective: No  Does infant appear satisfied after nursing:No  Stooling pattern normal: Yes  Urinating frequently:Yes  Using shield or shells: No    Alternative/Artificial Feedings:   Bottle: Yes, currently for every feeding  Cup: No  Syringe/Finger: No           Formula Type: Similac Advance                     Amount: 2 5 ounces            Breast Milk:                      Amount: 0 5ounces when available            Frequency Q 2-3 Hr between feedings  Elimination Problems: No      Equipment:  Nipple Shield             Type: none             Size: n/a             Frequency of Use: n/a  Pump            Type: Spectra S1            Frequency of Use: Every 2-2 5 hours  Expresses about 0 5 ounces each session  Shells            Type: none            Frequency of use: n/a    Equipment Problems: yes unable to express much milk    Mom:  Breast: Large slightly asymmetrical breasts  Soft with some areas of fullness  Able to express milk easily  Nipple Assessment in General: Normal: elongated/eraser  Mild abrasions on the face of both nipples    Mother's Awareness of Feeding Cues Recognizes: Yes                  Verbalizes: Yes  Support System: FOB, extended family  History of Breastfeeding: none  Changes/Stressors/Violence: Hector Romero is overwhelmed with breastfeeding difficulties and concerns with Harmeet's weight  Concerns/Goals: Hector Romero would like to EBF until returning to work and then continue by pumping up to a year  Problems with Mom: Concerns with supply  Physical Exam   Constitutional: She is oriented to person, place, and time  She appears well-developed and well-nourished  HENT:   Head: Normocephalic and atraumatic  Neck: Normal range of motion  Cardiovascular: Normal rate, regular rhythm and normal heart sounds  Pulmonary/Chest: Effort normal and breath sounds normal    Musculoskeletal: Normal range of motion  She exhibits edema  Neurological: She is alert and oriented to person, place, and time  Skin: Skin is warm and dry  Psychiatric: She has a normal mood and affect  Her behavior is normal  Judgment and thought content normal        Infant:  Behaviors: Alert  Color: Abdulaziz  Birth weight: 4139gram  Current weight: 3895gram    Problems with infant: Won't latch or nurse effectively  Concerns with slow weight gain  General Appearance:  Alert, active, no distress                             Head:  Normocephalic, AFOF, sutures opposed                             Eyes:  Conjunctiva clear, no drainage                              Ears:  Normally placed, no anomolies                             Nose:  no drainage or erythema                           Mouth:  No lesions  High anterior palate  See Hazelbaker assessment  When sucking on my finger, Briana Serene bit down frequently and did not suck rhythmically  Neck:  Supple, symmetrical, trachea midline                 Respiratory:  No grunting, flaring, retractions, breath sounds clear and equal            Cardiovascular:  Regular rate and rhythm  No murmur  Adequate perfusion/capillary refill  Femoral pulse present                    Abdomen:   Soft, non-tender, no masses, bowel sounds present, no HSM             Genitourinary:  Normal male, testes descended, no discharge, swelling, or pain, anus patent                          Spine:   No abnormalities noted        Musculoskeletal:  Full range of motion          Skin/Hair/Nails:   Skin warm, dry, and intact, no rashes or abnormal dyspigmentation or lesions                Neurologic:   No abnormal movement, tone appropriate for gestational age    Hazelbaker Assessment for Lingual Frenulum Function    Appearance Items Function Items   Appearance of tongue when lifted  2: Round or square   Lateralization  1: Body of tongue but not tongue tip   Elasticity of frenulum  1: Moderately elastic   Lift of tongue  1: Only edges to mid-mouth     Length of lingual frenulum when tongue lifted  lingual frenulum length: 1: 1 cm     Extension of tongue  1: Tip over lower gum only   Attachment of lingual frenulum to tongue  2: Posterior to tip   Spread of anterior tongue  1: Moderate of partial   Attachment of lingual frenulum to inferior alveolar ridge  2: Attached to floor of mouth or well below ridge Cupping  1: Side edges only, moderate cup   Ankyloglossia Grading:  Class I: mild, 12-16 mm  Class II: moderate, 8-11 mm  Class III: severe, 3-7 mm  ClassIV: complete, less than 3 mm Peristalsis  1: Partial, originating posterior to tip       SCORE:    Appearance: 8 (<8=ankyloglossia)  Function: 7 (<11=ankyloglossia) Snapback  1: Periodic          Latch:  Efficiency:               Lips Flanged: Yes              Depth of latch: good              Audible Swallow: Yes, some              Visible Milk: Yes              Wide Open/ Asymmetrical: Yes              Suck Swallow Cycle: Breathing: unlabored, Coordinated: yes  Nipple Assessment after latch: Normal: elongated/eraser, no discoloration and no damage noted    Latch Problems: Initially Philemon Minus would not latch or suckle  After being bottle fed just a few ml's to soothe him, he did latch and suckle on both breasts for an extended period  Some suckling bursts noted  Shalonda Pink encouraged him to continue feeding with breast compressions  He transferred 25gm of milk during the feeding  He was then fed formula via paced bottle feeding by his father while mom pumped  Position:  Infant's Ergonomics/Body               Body Alignment: Yes               Head Supported: Yes               Close to Mom's body/ Lifted/ Supported: Yes               Mom's Ergonomics/Body: Yes                           Supported: Yes                           Sitting Back: Yes                           Brings Baby to her breast: Yes  Positioning Problems: None  Shalonda Pink does a beautiful job and she and Tish Zavala appear very comfortable during the feeding  Handouts:   Paced bottle feeding, Hands on pumping, Hand expression, Increasing your supply and Latch Check List    Education:  Reviewed Latch: Demonstrated how to gently compress the breast and align the baby so that his nose is just above the nipple with his lower lip and chin touching the breast to encourage the deepest, widest, off-center latch  Reviewed Positioning for Dyad: Demonstrated correct positioning for cross cradle and football holds  Reminded Sandrita to position her hand on Harmeet's shoulders, not his head  Reviewed Frequency/Supply & Demand: Discussed how frequently and effectively stimulating/emptying the breast by nursing and/or pump can help establish milk supply  Delays can help with early/frequent supplementation and missed feedings at the breast   Reviewed Infant:Cues and varied States of Awareness  Reviewed Infant Elimination: Watch for 6-8 wet diapers daily and at least 4 soilded diapers as you transition to exclusive breast milk feedings  Reviewed Alternative/Artificial Feedings: Discussed and demonstrated paced bottle feeding    Reviewed Mom/Breast care: Protective ointment for tender nipples  Reviewed Equipment: Discussed and demonstrated the use and features of the Spectra S1 and the elements of hands on pumping  Discussed that the 28mm flanges Sandrita used appear too large, particularly on the right breast   She will try the 24mm flanges next time she pumps  Plan for breastfeeding    Reassurance and support given  Reviewed normal sucking patterns: transition from stimulation to nutritive to release or non-nutritive  Watch for sustained periods of active suckling and swallowing  Reviewed normal nursing pattern: infant should nurse for at least 5 minutes or until releases on own  Discussed difference in sensation of non-nutritive v nutritive sucking   Spend as much time as possible skin to skin  This helps encourage breastfeeding behavior in your baby and can help you with your supply  Offer the breast first at early feeding cues  When attempting to latch, Compress your breast to make it narrow in the same direction your baby's  mouth is positioned  Move your baby onto your breast so their chin touches first and their head tips back  Your nipple should be at their nose  When they open their mouth wide, move them onto the breast so your nipple enters their mouth pointing upward  He should begin to suck to stimulate milk flow and then quickly transition to sucking and swallowing  Encourage him to keep eating with breast compressions  When he is no longer actively feeding, move him to the second breast   If he is not content after nursing on both breasts, offer supplement via paced bottle feeding as needed  Pumping can help establish your supply  After feedings, pump to remove more milk from the breasts  Cycle your pump through stimulation and expression mode several times in a session to stimulate several let downs  Use hands on pumping and hand expression to increase your output  Maintain your pump as recommended     Watch Harmeet's wets and soiled diapers closely and call if less than desired  Follow up next week  Please call with any questions or concerns  I have spent 90 minutes with Patient and family today in which greater than 50% of this time was spent in counseling/coordination of care regarding Intructions for management

## 2018-09-07 NOTE — PATIENT INSTRUCTIONS
Plan for breastfeeding    Reassurance and support given  Reviewed normal sucking patterns: transition from stimulation to nutritive to release or non-nutritive  Watch for sustained periods of active suckling and swallowing  Reviewed normal nursing pattern: infant should nurse for at least 5 minutes or until releases on own  Discussed difference in sensation of non-nutritive v nutritive sucking   Spend as much time as possible skin to skin  This helps encourage breastfeeding behavior in your baby and can help you with your supply  Offer the breast first at early feeding cues  When attempting to latch, Compress your breast to make it narrow in the same direction your baby's  mouth is positioned  Move your baby onto your breast so their chin touches first and their head tips back  Your nipple should be at their nose  When they open their mouth wide, move them onto the breast so your nipple enters their mouth pointing upward  He should begin to suck to stimulate milk flow and then quickly transition to sucking and swallowing  Encourage him to keep eating with breast compressions  When he is no longer actively feeding, move him to the second breast   If he is not content after nursing on both breasts, offer supplement via paced bottle feeding as needed  Pumping can help establish your supply  After feedings, pump to remove more milk from the breasts  Cycle your pump through stimulation and expression mode several times in a session to stimulate several let downs  Use hands on pumping and hand expression to increase your output  Maintain your pump as recommended  Watch Harmeet's wets and soiled diapers closely and call if less than desired  Follow up next week  Please call with any questions or concerns

## 2018-09-08 NOTE — PROGRESS NOTES
I have reviewed the notes, assessments, and/or procedures performed by Mague Wallace RN, IBCLC, I concur with her/his documentation of Alyssia Jacques

## 2018-09-18 ENCOUNTER — OFFICE VISIT (OUTPATIENT)
Dept: POSTPARTUM | Facility: CLINIC | Age: 33
End: 2018-09-18
Payer: COMMERCIAL

## 2018-09-18 DIAGNOSIS — O92.79 PAIN AGGRAVATED BY BREAST FEEDING: ICD-10-CM

## 2018-09-18 DIAGNOSIS — Z71.89 ENCOUNTER FOR BREAST FEEDING COUNSELING: Primary | ICD-10-CM

## 2018-09-18 DIAGNOSIS — O92.70 LACTATION PROBLEM: ICD-10-CM

## 2018-09-18 DIAGNOSIS — R52 PAIN AGGRAVATED BY BREAST FEEDING: ICD-10-CM

## 2018-09-18 PROCEDURE — 99404 PREV MED CNSL INDIV APPRX 60: CPT | Performed by: PEDIATRICS

## 2018-09-18 NOTE — PATIENT INSTRUCTIONS
Plan:  Continue with current plan  If your discomfort during feedings or Harmeet's difficulty with latch or biting during feedings does not improve or if he still needs supplementation after feedings at the breast, you may want to consider and evaluation with Dr Stephanie Perez

## 2018-09-18 NOTE — PROGRESS NOTES
BREAST FEEDING FOLLOW UP VISIT    Informant/Relationship: Sandrita    Discussion of General Lactation Issues: Phil Santos still has pain frequently with feedings and [facility] has struggles with latch at times  He also still requires supplementation after some feedings  Sandrita purchased nipple shields at a friend's request but she has not tried them yet  Phil Santos is also pumping and is able to express 1-2 ounces each session now  Infant is 3weeks old today  Interval Breastfeeding History:    Frequency of breast feeding: Every 1-3 hours  Does mother feel breastfeeding is effective: If no, explain: occasionally needs to be supplemented  Does infant appear satisfied after nursing:If no, explain: sometimes is not content  Stooling pattern normal:Yes  Urinating frequently:Yes  Using shield or shells:No    Alternative/Artificial Feedings:   Bottle: Yes, occasionally after feedings at the breast   Cup: No  Syringe/Finger: No           Formula Type: Similac Advance                     Amount: 1-2 ounces when breast milk not available            Breast Milk:                      Amount: 1-2 ounces            Frequency Q 1-3 Hr between feedings  Elimination Problems: No      Equipment:  Nipple Shield             Type: none currently             Size: n/a             Frequency of Use: n/a  Pump            Type: Spectra S1            Frequency of Use: About 5 times a day  Shells            Type: none            Frequency of use: n/a    Equipment Problems: no    Mom:  Breast: Large slightly asymmetrical breasts  Soft with some areas of fullness  Able to express milk easily  Nipple Assessment in General: Normal: elongated/eraser  Abrasions on the face of the nipples healed  Mother's Awareness of Feeding Cues                 Recognizes:  Yes                  Verbalizes: Yes  Support System: FOB, extended family  History of Breastfeeding: none  Changes/Stressors/Violence: Phil Santos is overwhelmed with breastfeeding difficulties and concerns with Harmeet's weight  Concerns/Goals: Corky Sheehan would like to EBF until returning to work and then continue by pumping up to a year      Problems with Mom: Concerns with supply  Physical Exam   Constitutional: She is oriented to person, place, and time  She appears well-developed and well-nourished  HENT:   Head: Normocephalic and atraumatic  Neck: Normal range of motion  Pulmonary/Chest: Effort normal    Musculoskeletal: Normal range of motion  Neurological: She is alert and oriented to person, place, and time  Skin: Skin is warm and dry  Psychiatric: She has a normal mood and affect  Her behavior is normal  Judgment and thought content normal        Infant:  Behaviors: Alert  Color: Pink  Birth weight: 3895gram  Current weight: 4275gram    Problems with infant: Trouble with latch, not content at the breast    General Appearance:  Alert, active, no distress                             Head:  Normocephalic, AFOF, sutures opposed                             Eyes:  Conjunctiva clear, no drainage                              Ears:  Normally placed, no anomolies                             Nose:  no drainage or erythema                           Mouth:  No lesions  High anterior palate  See Hazelbaker assessment  When sucking on my finger, Vinita Haven bit down frequently  He did briefly suck appropriately but did not establish a rhythm  Neck:  Supple, symmetrical, trachea midline                 Respiratory:  No grunting, flaring, retractions, breath sounds clear and equal            Cardiovascular:  Regular rate and rhythm  No murmur  Adequate perfusion/capillary refill   Femoral pulse present                    Abdomen:   Soft, non-tender, no masses, bowel sounds present, no HSM             Genitourinary:  Normal male, testes descended, no discharge, swelling, or pain, anus patent                          Spine:   No abnormalities noted        Musculoskeletal: Full range of motion          Skin/Hair/Nails:   Skin warm, dry, and intact, no rashes or abnormal dyspigmentation or lesions                Neurologic:   No abnormal movement, tone appropriate for gestational age     Ino Assessment for Lingual Frenulum Function     Appearance Items Function Items   Appearance of tongue when lifted  2: Round or square    Lateralization  1: Body of tongue but not tongue tip   Elasticity of frenulum  1: Moderately elastic    Lift of tongue  1: Only edges to mid-mouth      Length of lingual frenulum when tongue lifted  lingual frenulum length: 1: 1 cm       Extension of tongue  1: Tip over lower gum only   Attachment of lingual frenulum to tongue  2: Posterior to tip    Spread of anterior tongue  1: Moderate of partial   Attachment of lingual frenulum to inferior alveolar ridge  2: Attached to floor of mouth or well below ridge Cupping  1: Side edges only, moderate cup   Ankyloglossia Grading:  Class I: mild, 12-16 mm  Class II: moderate, 8-11 mm  Class III: severe, 3-7 mm  ClassIV: complete, less than 3 mm Peristalsis  1: Partial, originating posterior to tip         SCORE:     Appearance: 8 (<8=ankyloglossia)  Function: 7 (<11=ankyloglossia) Snapback  1: Periodic           [place] Latch:  Efficiency:               Lips Flanged: Yes              Depth of latch: good              Audible Swallow: Yes, some              Visible Milk: Yes              Wide Open/ Asymmetrical: Yes              Suck Swallow Cycle: Breathing: unlabored, Coordinated: yes  Nipple Assessment after latch: Vertical compression of both nipples  Latch Problems: None  Mercy Vicente reports that occasionally it feels like Johnathan Denise is "biting down" during the feeding  This is very painful for her  He transferred 25gm of milk during this feeding  This is the same amount as noted at the prior weighted feed last week      Position:  Infant's Ergonomics/Body               Body Alignment: Yes               Head Supported: Yes               Close to Mom's body/ Lifted/ Supported: Yes               Mom's Ergonomics/Body: Yes                           Supported: Yes                           Sitting Back: Yes                           Brings Baby to her breast: Yes  Positioning Problems: None      Handouts:   None    Education:  Reviewed Mom/Breast care: Discussed moist wound healing for sore nipples  Plan:  Continue with current plan  If your discomfort during feedings or Harmeet's difficulty with latch or biting during feedings does not improve or if he still needs supplementation after feedings at the breast, you may want to consider and evaluation with Dr Cristina Her  I have spent 60 minutes with Patient and family today in which greater than 50% of this time was spent in counseling/coordination of care regarding Patient and family education

## 2018-09-20 ENCOUNTER — POSTPARTUM VISIT (OUTPATIENT)
Dept: OBGYN CLINIC | Facility: CLINIC | Age: 33
End: 2018-09-20

## 2018-09-20 VITALS — DIASTOLIC BLOOD PRESSURE: 82 MMHG | WEIGHT: 244 LBS | BODY MASS INDEX: 38.22 KG/M2 | SYSTOLIC BLOOD PRESSURE: 106 MMHG

## 2018-09-20 PROBLEM — Z3A.39 39 WEEKS GESTATION OF PREGNANCY: Status: RESOLVED | Noted: 2018-02-22 | Resolved: 2018-09-20

## 2018-09-20 PROBLEM — E66.01 MATERNAL MORBID OBESITY IN THIRD TRIMESTER, ANTEPARTUM (HCC): Status: RESOLVED | Noted: 2018-02-22 | Resolved: 2018-09-20

## 2018-09-20 PROBLEM — O24.419 GESTATIONAL DIABETES MELLITUS (GDM): Status: RESOLVED | Noted: 2018-05-14 | Resolved: 2018-09-20

## 2018-09-20 PROBLEM — Z34.03 ENCOUNTER FOR SUPERVISION OF NORMAL FIRST PREGNANCY IN THIRD TRIMESTER: Status: RESOLVED | Noted: 2018-04-19 | Resolved: 2018-09-20

## 2018-09-20 PROBLEM — O99.213 MATERNAL MORBID OBESITY IN THIRD TRIMESTER, ANTEPARTUM (HCC): Status: RESOLVED | Noted: 2018-02-22 | Resolved: 2018-09-20

## 2018-09-20 PROCEDURE — 99024 POSTOP FOLLOW-UP VISIT: CPT | Performed by: OBSTETRICS & GYNECOLOGY

## 2018-09-20 NOTE — PROGRESS NOTES
So Louie  1985    S:  29 y o  female here for postpartum visit  She is s/p Uncomplicated vaginal delivery on 8/31/2018   Was induced for poly/GDM (?)  Had very short second stage, 5 minutes  Second degree laceration  Gender: male "Davene Paget"  Apgars: 8/9  Weight: 9lb 2oz  Her lochia is very light   She is breastfeeding and supplementing without problems  She denies postpartum blues/depression  Her EPDS is 2  Last Pap: 2/3/2017  Normal, Neg HPV    We discussed contraceptive options in detail including birth control pills, patches, NuvaRing, DepoProvera, Mirena/Kyleena IUD, Nexplanon in detail  At this point she would like to use condoms   may consider vasectomy  O:   /82 (BP Location: Left arm, Patient Position: Sitting, Cuff Size: Large)   Wt 111 kg (244 lb)   LMP 11/29/2017 (Exact Date)   BMI 38 22 kg/m²   She appears well and in no distress  Abdomen is soft and nontender  External genitals are normal  Vagina is normal      A/P:  Postpartum visit  She will return in 3-4 months for her yearly exam, sooner PRN  Will follow up with PCP in regards to GDM testing, with uncertain diagnosis she would prefer not to do 2 hour GTT

## 2018-09-26 ENCOUNTER — OFFICE VISIT (OUTPATIENT)
Dept: POSTPARTUM | Facility: CLINIC | Age: 33
End: 2018-09-26

## 2018-09-26 VITALS — SYSTOLIC BLOOD PRESSURE: 120 MMHG | DIASTOLIC BLOOD PRESSURE: 86 MMHG

## 2018-09-26 DIAGNOSIS — N64.4 NIPPLE PAIN: ICD-10-CM

## 2018-09-26 DIAGNOSIS — O92.79 NURSING DIFFICULTY: Primary | ICD-10-CM

## 2018-09-26 NOTE — PROGRESS NOTES
BREAST FEEDING FOLLOW UP VISIT    Informant/Relationship: Sandrita/mom    Discussion of General Lactation Issues: Phil Santos and Luz Marina Workman have been several times for lactation support, but it is still painful when [employer]  It feels as if he is biting  Donis Saint, RN, IBCLC thinks he might have a "posterior" tongue tie  Infant is 2 weeks old today  Interval Breastfeeding History:    Frequency of breast feedin-3 x/day  Does mother feel breastfeeding is effective: Yes  Does infant appear satisfied after nursing:Yes  Stooling pattern normal:Yes  Urinating frequently:Yes  Using shield or shells:No    Alternative/Artificial Feedings:   Bottle: Yes, for EBM and formula most of the time due to pain  Cup: N/A  Syringe/Finger: N/A           Formula Type: Similac Pro Advance                     Amount: 3 oz            Breast Milk:                      Amount: 3 oz            Frequency Q 2-3 Hr between feedings  Elimination Problems: No      Equipment:  Nipple Shield             Type: n/a             Size: n/a             Frequency of Use: n/a  Pump            Type: Spectra S1            Frequency of Use: About 5x/day  Shells            Type: n/a            Frequency of use: n/a    Equipment Problems: no      Mom:  Breast: Normal  Nipple Assessment in General: Normal: elongated/eraser, no discoloration and no damage noted  Mother's Awareness of Feeding Cues                 Recognizes: Yes                  Verbalizes: Yes  Support System: FOB  History of Breastfeeding: none  Changes/Stressors/Violence: pain with nursing  Concerns/Goals: Sandrita would like to nurse without pain    Problems with Mom: None    Physical Exam   Constitutional: She is oriented to person, place, and time  She appears well-developed and well-nourished  Neck: Normal range of motion  Neck supple  No thyromegaly present  Cardiovascular: Normal rate, regular rhythm, normal heart sounds and intact distal pulses      No murmur heard   Pulmonary/Chest: Effort normal and breath sounds normal    Musculoskeletal: Normal range of motion  She exhibits no edema, tenderness or deformity  Lymphadenopathy:     She has no cervical adenopathy  Neurological: She is alert and oriented to person, place, and time  Skin: Skin is warm and dry  No erythema  Psychiatric: She has a normal mood and affect  Her behavior is normal  Judgment and thought content normal        Infant:  Behaviors: Alert  Color: Pink  Birth weight: 4 139kg  Current weight: 4 645kg    Problems with infant: Tongue tie, difficulty nursing      General Appearance:  Alert, active, no distress                             Head:  Normocephalic, AFOF, sutures opposed                             Eyes:  Conjunctiva clear, no drainage                              Ears:  Normally placed, no anomolies                             Nose:  Septum intact, no drainage or erythema                           Mouth:  No lesions; tongue with limited lift and flattening with lift; "speed bump" palpated, lateralization decreased  Neck:  Supple, symmetrical, trachea midline, no adenopathy                                                            Respiratory:  No grunting, flaring, retractions, breath sounds clear and equal                       Cardiovascular:  Regular rate and rhythm  No murmur  Adequate perfusion/capillary refill   Femoral pulse present                          Abdomen:   Soft, non-tender, no masses, bowel sounds present, no HSM                          Genitourinary:  Normal male, testes descended, no discharge, swelling, or pain                               Spine:   No abnormalities noted                          Musculoskeletal:  Full range of motion                         Skin/Hair/Nails:   Skin warm, dry, and intact, no rashes or abnormal dyspigmentation or lesions                             Neurologic:   No abnormal movement, tone appropriate for gestational age    Grady Latch:  Efficiency:               Lips Flanged: Yes, after frenotomy              Depth of latch: Good, after frenotomy              Audible Swallow: Yes, after frenotomy              Visible Milk: Yes, after frenotomy              Wide Open/ Asymmetrical: Yes, after frenotomy              Suck Swallow Cycle: Breathing: unlabored, Coordinated: yes  Nipple Assessment after latch: Normal: elongated/eraser, no discoloration and no damage noted  Latch Problems: Painful latch secondary to some decreased tongue movement, much improved after frenotomy    Position:  Infant's Ergonomics/Body               Body Alignment: Yes               Head Supported: Yes               Close to Mom's body/ Lifted/ Supported: Yes               Mom's Ergonomics/Body: Yes                           Supported: Yes                           Sitting Back: Yes                           Brings Baby to her breast: Yes  Positioning Problems: None        Education:  Reviewed Latch: Reviewed how to gently compress the breast as if offering the baby a sandwich to improve the depth of the sandwich  Discussed the importance of a deep latch for maternal comfort and infant success  Reviewed Positioning for Dyad: Reviewed how to position the baby so that his lower lip and chin touch the breast with his nose just above the nipple to encourage a wider, more asymmetric latch  Discussed the importance of the width and asymmetry for maternal comfort  Reviewed Frequency/Supply & Demand: Encouraged maintaining a frequency of nursing that is on demand, but at least every 3 hours during the day and every 5 hours at night  Reviewed Alternative/Artificial Feedings: Support given for continued use of paced bottle feeding when supplementing  Plan:  Discussed the history and exams with Maryellen Petersen and her    Reviewed the impact that a tongue tie may have on breastfeeding, from causing nipple trauma that may lead to nipple damage, to decreasing the effectiveness of milk transfer that may lead to poor infant weight gain, an increased risk of blocked ducts, mastitis, or milk production loss for mom  Reviewed the risks and benefits of a frenotomy and after obtaining consent, performed the procedure with increased comfort nursing  I have spent 45 minutes with Patient and family today in which greater than 50% of this time was spent in counseling/coordination of care regarding Prognosis, Risks and benefits of tx options, Intructions for management, Patient and family education, Importance of tx compliance, Risk factor reductions and Impressions

## 2018-10-06 NOTE — PROGRESS NOTES
I have reviewed the notes, assessments, and/or procedures performed by Vimal Jones, RN, IBCLC, I concur with her/his documentation of Cinthya Melchor

## 2018-10-23 ENCOUNTER — OFFICE VISIT (OUTPATIENT)
Dept: PULMONOLOGY | Facility: CLINIC | Age: 33
End: 2018-10-23
Payer: COMMERCIAL

## 2018-10-23 VITALS
RESPIRATION RATE: 18 BRPM | SYSTOLIC BLOOD PRESSURE: 124 MMHG | HEART RATE: 64 BPM | BODY MASS INDEX: 40.47 KG/M2 | WEIGHT: 258.4 LBS | TEMPERATURE: 98.5 F | OXYGEN SATURATION: 98 % | DIASTOLIC BLOOD PRESSURE: 76 MMHG

## 2018-10-23 DIAGNOSIS — J45.40 MODERATE PERSISTENT ASTHMA WITHOUT COMPLICATION: Primary | ICD-10-CM

## 2018-10-23 DIAGNOSIS — J30.89 SEASONAL ALLERGIC RHINITIS DUE TO OTHER ALLERGIC TRIGGER: ICD-10-CM

## 2018-10-23 PROCEDURE — 99213 OFFICE O/P EST LOW 20 MIN: CPT | Performed by: INTERNAL MEDICINE

## 2018-10-23 RX ORDER — FLUTICASONE FUROATE AND VILANTEROL 100; 25 UG/1; UG/1
1 POWDER RESPIRATORY (INHALATION) DAILY
Qty: 3 INHALER | Refills: 3 | Status: SHIPPED | OUTPATIENT
Start: 2018-10-23 | End: 2019-12-20 | Stop reason: SDUPTHER

## 2018-10-23 NOTE — PROGRESS NOTES
Progress note - Pulmonary Medicine   Kamini Pisano 29 y o  female MRN: [id number]       Impression & Plan: Moderate persistent asthma  · Symptoms very well controlled on Breo 200  · Almost never needs rescue Ventolin  · We discussed De-escalating to the Breo 100/25 strength since she has had such good control  Would like to minimize the inhaled corticosteroid as much as possible  She is a agreeable with this approach  · She plans to get her flu shot when she returns to work in the next 2 weeks  She did not want to receive it today    Allergic rhinitis  · She typically uses Mucinex D when her allergy symptoms flare up and this helps dry her up and control the nasal drip symptoms    I will see her back in 1 year for routine asthma follow-up  She was instructed to call me with any new or worsening symptoms  ______________________________________________________________________    HPI:    Kamini Pisano presents today for follow-up of moderate persistent asthma  Her symptoms have been very well controlled on Breo Ellipta 200/25  She typically gets allergy symptoms in the late fall, when her time  She uses Mucinex D to help control the allergy symptoms  With regular use of Breo Ellipta, she has not required use of rescue albuterol  She almost never requires her Ventolin  She denies cough or phlegm production  She has no new medical problems  She did just recently have a baby boy about 3 months ago which is her first child and both she and the baby are doing well        Review of Systems:  Review of Systems  Answers for HPI/ROS submitted by the patient on 10/23/2018   Primary symptoms  Do you have shortness of breath that occurs with effort or exertion?: No  Do you have ear congestion?: No  Do you have heartburn?: No  Do you have fatigue?: No  Do you have nasal congestion?: No  Do you have shortness of breath when lying flat?: No  Do you have shortness of breath when you wake up?: No  Do you have sweats?: No  Have you experienced weight loss?: Yes  Which of the following makes your symptoms worse?: animal exposure, change in weather, pollen  Which of the following makes your symptoms better?: OTC cough suppressant      Past medical history, surgical history, and family history were reviewed and updated as appropriate    Social history updates:  History   Smoking Status    Former Smoker    Packs/day: 0 50    Years: 10 00    Quit date: 7/9/2017   Smokeless Tobacco    Never Used     Comment: Remote  A few cigarettes socially during college years       PhysicalExamination:  Vitals:   /76   Pulse 64   Temp 98 5 °F (36 9 °C)   Resp 18   Wt 117 kg (258 lb 6 4 oz)   SpO2 98%   BMI 40 47 kg/m²     Physical Exam:   Gen: Comfortable  Non-labored  HEENT: PERRL  O/P: clear  moist  Neck: Trachea is midline  No JVD  No adenopathy  Chest:  Clear bilaterally  Cardiac:  Regular  no murmur  Abdomen: Soft and nontender  Bowel sounds are present  Extremities: No edema    Diagnostic Data:  Labs:   I personally reviewed the most recent laboratory data pertinent to today's visit    Lab Results   Component Value Date    WBC 9 99 08/29/2018    HGB 9 7 (L) 08/29/2018    HCT 29 4 (L) 08/29/2018    MCV 90 08/29/2018     08/29/2018     Radha Castillo MD

## 2018-10-23 NOTE — ASSESSMENT & PLAN NOTE
· Symptoms very well controlled on Breo 200  · Almost never needs rescue Ventolin  · We discussed De-escalating to the Breo 100/25 strength since she has had such good control  Would like to minimize the inhaled corticosteroid as much as possible  She is a agreeable with this approach  · She plans to get her flu shot when she returns to work in the next 2 weeks    She did not want to receive it today

## 2018-10-23 NOTE — ASSESSMENT & PLAN NOTE
· She typically uses Mucinex D when her allergy symptoms flare up and this helps dry her up and control the nasal drip symptoms

## 2018-10-23 NOTE — LETTER
October 23, 2018     Kevin Gomez, [address]    Patient: Venkata Snow   YOB: 1985   Date of Visit: 10/23/2018       Dear Dr Emmie Hinkle: Thank you for referring Venkata Snow to me for evaluation  Below are my notes for this consultation  If you have questions, please do not hesitate to call me  I look forward to following your patient along with you  Sincerely,        Ann Hung MD        CC: No Recipients  Ann Hung MD  10/23/2018  2:28 PM  Sign at close encounter    Progress note - Pulmonary Medicine   Venkata Snow 29 y o  female MRN: [id number]       Impression & Plan: Moderate persistent asthma  · Symptoms very well controlled on Breo 200  · Almost never needs rescue Ventolin  · We discussed De-escalating to the Breo 100/25 strength since she has had such good control  Would like to minimize the inhaled corticosteroid as much as possible  She is a agreeable with this approach  · She plans to get her flu shot when she returns to work in the next 2 weeks  She did not want to receive it today    Allergic rhinitis  · She typically uses Mucinex D when her allergy symptoms flare up and this helps dry her up and control the nasal drip symptoms    I will see her back in 1 year for routine asthma follow-up  She was instructed to call me with any new or worsening symptoms  ______________________________________________________________________    HPI:    Venkata Snow presents today for follow-up of moderate persistent asthma  Her symptoms have been very well controlled on Breo Ellipta 200/25  She typically gets allergy symptoms in the late fall, when her time  She uses Mucinex D to help control the allergy symptoms  With regular use of Breo Ellipta, she has not required use of rescue albuterol  She almost never requires her Ventolin  She denies cough or phlegm production  She has no new medical problems    She did just recently have a baby boy about 3 months ago which is her first child and both she and the baby are doing well  Review of Systems:  Review of Systems  Answers for HPI/ROS submitted by the patient on 10/23/2018   Primary symptoms  Do you have shortness of breath that occurs with effort or exertion?: No  Do you have ear congestion?: No  Do you have heartburn?: No  Do you have fatigue?: No  Do you have nasal congestion?: No  Do you have shortness of breath when lying flat?: No  Do you have shortness of breath when you wake up?: No  Do you have sweats?: No  Have you experienced weight loss?: Yes  Which of the following makes your symptoms worse?: animal exposure, change in weather, pollen  Which of the following makes your symptoms better?: OTC cough suppressant      Past medical history, surgical history, and family history were reviewed and updated as appropriate    Social history updates:  History   Smoking Status    Former Smoker    Packs/day: 0 50    Years: 10 00    Quit date: 7/9/2017   Smokeless Tobacco    Never Used     Comment: Remote  A few cigarettes socially during college years       PhysicalExamination:  Vitals:   /76   Pulse 64   Temp 98 5 °F (36 9 °C)   Resp 18   Wt 117 kg (258 lb 6 4 oz)   SpO2 98%   BMI 40 47 kg/m²      Physical Exam:   Gen: Comfortable  Non-labored  HEENT: PERRL  O/P: clear  moist  Neck: Trachea is midline  No JVD  No adenopathy  Chest:  Clear bilaterally  Cardiac:  Regular  no murmur  Abdomen: Soft and nontender  Bowel sounds are present  Extremities: No edema    Diagnostic Data:  Labs:   I personally reviewed the most recent laboratory data pertinent to today's visit    Lab Results   Component Value Date    WBC 9 99 08/29/2018    HGB 9 7 (L) 08/29/2018    HCT 29 4 (L) 08/29/2018    MCV 90 08/29/2018     08/29/2018     Ignacia Ruiz MD

## 2018-11-14 ENCOUNTER — TELEPHONE (OUTPATIENT)
Dept: OBGYN CLINIC | Facility: CLINIC | Age: 33
End: 2018-11-14

## 2019-01-14 ENCOUNTER — OFFICE VISIT (OUTPATIENT)
Dept: URGENT CARE | Facility: CLINIC | Age: 34
End: 2019-01-14
Payer: COMMERCIAL

## 2019-01-14 VITALS
DIASTOLIC BLOOD PRESSURE: 84 MMHG | BODY MASS INDEX: 41.12 KG/M2 | TEMPERATURE: 100.8 F | RESPIRATION RATE: 18 BRPM | HEIGHT: 67 IN | HEART RATE: 108 BPM | OXYGEN SATURATION: 99 % | SYSTOLIC BLOOD PRESSURE: 118 MMHG | WEIGHT: 262 LBS

## 2019-01-14 DIAGNOSIS — R05.9 COUGH: ICD-10-CM

## 2019-01-14 DIAGNOSIS — J11.1 INFLUENZA-LIKE ILLNESS: ICD-10-CM

## 2019-01-14 DIAGNOSIS — R52 BODY ACHES: ICD-10-CM

## 2019-01-14 DIAGNOSIS — R50.9 FEVER, UNSPECIFIED FEVER CAUSE: Primary | ICD-10-CM

## 2019-01-14 DIAGNOSIS — J02.9 SORE THROAT: ICD-10-CM

## 2019-01-14 LAB — S PYO AG THROAT QL: NEGATIVE

## 2019-01-14 PROCEDURE — 87430 STREP A AG IA: CPT | Performed by: PHYSICIAN ASSISTANT

## 2019-01-14 PROCEDURE — 87070 CULTURE OTHR SPECIMN AEROBIC: CPT | Performed by: PHYSICIAN ASSISTANT

## 2019-01-14 PROCEDURE — 99213 OFFICE O/P EST LOW 20 MIN: CPT | Performed by: PHYSICIAN ASSISTANT

## 2019-01-14 PROCEDURE — 87631 RESP VIRUS 3-5 TARGETS: CPT | Performed by: PHYSICIAN ASSISTANT

## 2019-01-14 PROCEDURE — S9088 SERVICES PROVIDED IN URGENT: HCPCS | Performed by: PHYSICIAN ASSISTANT

## 2019-01-14 RX ORDER — OSELTAMIVIR PHOSPHATE 75 MG/1
75 CAPSULE ORAL EVERY 12 HOURS SCHEDULED
Qty: 10 CAPSULE | Refills: 0 | Status: SHIPPED | OUTPATIENT
Start: 2019-01-14 | End: 2019-01-17

## 2019-01-14 NOTE — LETTER
January 14, 2019     Patient: Otto Simon   YOB: 1985   Date of Visit: 1/14/2019       To Whom it May Concern:    Otto Simon was seen in my clinic on 1/14/2019  She may return to work on 1/16/2019  If you have any questions or concerns, please don't hesitate to call           Sincerely,          Jose M MARCO Pena        CC: No Recipients

## 2019-01-14 NOTE — PATIENT INSTRUCTIONS
Rapid strep negative, will send for culture and call if positive  Will send flu swab for testing  Call the office tomorrow for results  If influenza positive, begin tamiflu  Twice daily for 5 days  Take with food  Increase fluid intake  Over the counter medications as needed for symptomatic relief  Follow up with your PCP for persistent symptoms  Go to the ER for any distress

## 2019-01-14 NOTE — PROGRESS NOTES
[id number] [facility]        NAME: Shorty Salinas is a 29 y o  female  : 1985    MRN: [id number]  DATE: 2019  TIME: 3:55 PM     Assessment and Plan   Fever, unspecified fever cause [R50 9]  1  Fever, unspecified fever cause  Throat culture    INFLUENZA A/B AND RSV, PCR    oseltamivir (TAMIFLU) 75 mg capsule   2  Sore throat  POCT rapid strepA    Throat culture    INFLUENZA A/B AND RSV, PCR    oseltamivir (TAMIFLU) 75 mg capsule   3  Cough  INFLUENZA A/B AND RSV, PCR    oseltamivir (TAMIFLU) 75 mg capsule   4  Body aches  INFLUENZA A/B AND RSV, PCR    oseltamivir (TAMIFLU) 75 mg capsule   5  Influenza-like illness  oseltamivir (TAMIFLU) 75 mg capsule         Patient Instructions     Rapid strep negative, will send for culture and call if positive  Will send flu swab for testing  Call the office tomorrow for results  If influenza positive, begin tamiflu  Twice daily for 5 days  Take with food  Increase fluid intake  Over the counter medications as needed for symptomatic relief  Follow up with your PCP for persistent symptoms  Go to the ER for any distress  Chief Complaint     Chief Complaint   Patient presents with   Sunita Prakash Like Symptoms     began     Cough     roscoe related to post-nasal drip    Fever     began today         History of Present Illness       This is a 29year old female PMH asthma presenting for sore throat and fever  She reports that yesterday she started with mild sinus congestion and cough, today she felt worse with body aches, fatigue, shortness of breath  She developed a fever this afternoon of 101 6 F, and had 1 episode of emesis which prompted her to seek medical care  She does have a 2 month old son at home who was sick earlier this week with sinus congestion, rhinorrhea, and fever never above 101  He does go to   She has been using her albuterol inhaler and taking OTC medications   She did get a flu shot this year as she is a [employer] [employer] employee  Review of Systems   Review of Systems   Constitutional: Positive for chills, fatigue and fever  HENT: Positive for congestion, postnasal drip, rhinorrhea and sore throat  Negative for ear pain  Respiratory: Positive for cough, chest tightness and shortness of breath  Gastrointestinal: Positive for nausea and vomiting  Negative for abdominal pain and diarrhea  Skin: Negative for rash  Neurological: Negative for dizziness and headaches           Current Medications       Current Outpatient Prescriptions:     albuterol (VENTOLIN HFA) 90 mcg/act inhaler, Inhale 2 puffs every 4 (four) hours as needed  , Disp: , Rfl:     fluticasone-vilanterol (BREO ELLIPTA) 100-25 mcg/inh inhaler, Inhale 1 puff daily Rinse mouth after use , Disp: 3 Inhaler, Rfl: 3    oseltamivir (TAMIFLU) 75 mg capsule, Take 1 capsule (75 mg total) by mouth every 12 (twelve) hours for 5 days, Disp: 10 capsule, Rfl: 0    Prenatal Multivit-Min-Fe-FA (PRENATAL VITAMINS PO), Take 1 tablet by mouth daily  , Disp: , Rfl:     Current Allergies     Allergies as of 01/14/2019 - Reviewed 01/14/2019   Allergen Reaction Noted    Sulfa antibiotics Other (See Comments) 08/23/2012            The following portions of the patient's history were reviewed and updated as appropriate: allergies, current medications, past family history, past medical history, past social history, past surgical history and problem list      Past Medical History:   Diagnosis Date    Asthma     had vaccine     Diabetes mellitus ([facility] [address] )     gestational DM diet controlled    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    only got  sperm tested, naturally conceived     Lumbar strain 7/16/2015    Varicella     chicken pox as child    Visual impairment     eyewear        Past Surgical History:   Procedure Laterality Date    FOOT SURGERY      WISDOM TOOTH EXTRACTION         Family History   Problem Relation Age of Onset    Multiple sclerosis Mother     Skin cancer Mother    lGenda Bautista Cancer Mother     Diabetes Father     Other Father         Pericarditis    Asthma Maternal Grandmother     Miscarriages / Stillbirths Sister     Diabetes Paternal Grandmother     Learning disabilities Other          Medications have been verified  Objective   /84 (BP Location: Right arm, Patient Position: Sitting, Cuff Size: Large)   Pulse (!) 108   Temp (!) 100 8 °F (38 2 °C) (Tympanic Core)   Resp 18   Ht 5' 7" (1 702 m)   Wt 119 kg (262 lb)   SpO2 99%   BMI 41 04 kg/m²        Physical Exam     Physical Exam   Constitutional: She appears well-developed and well-nourished  No distress  HENT:   Right Ear: Tympanic membrane, external ear and ear canal normal    Left Ear: Tympanic membrane, external ear and ear canal normal    Nose: Nose normal    Mouth/Throat: Uvula is midline and mucous membranes are normal  Posterior oropharyngeal edema and posterior oropharyngeal erythema present  No oropharyngeal exudate  Eyes: Conjunctivae are normal    Neck: Normal range of motion  Neck supple  Cardiovascular: Normal rate, regular rhythm and normal heart sounds  Pulmonary/Chest: Effort normal and breath sounds normal  No respiratory distress  She has no decreased breath sounds  She has no wheezes  She has no rales  Abdominal: Soft  Bowel sounds are normal  She exhibits no distension and no mass  There is no tenderness  There is no rebound and no guarding  Lymphadenopathy:     She has no cervical adenopathy  Neurological: She is alert  Skin: Skin is warm and dry  She is not diaphoretic  Nursing note and vitals reviewed

## 2019-01-15 ENCOUNTER — TELEPHONE (OUTPATIENT)
Dept: URGENT CARE | Facility: CLINIC | Age: 34
End: 2019-01-15

## 2019-01-15 ENCOUNTER — DOCUMENTATION (OUTPATIENT)
Dept: URGENT CARE | Facility: CLINIC | Age: 34
End: 2019-01-15

## 2019-01-15 LAB
FLUAV AG SPEC QL: NORMAL
FLUBV AG SPEC QL: NORMAL
RSV B RNA SPEC QL NAA+PROBE: NORMAL

## 2019-01-16 LAB — BACTERIA THROAT CULT: NORMAL

## 2019-01-17 ENCOUNTER — OFFICE VISIT (OUTPATIENT)
Dept: FAMILY MEDICINE CLINIC | Facility: CLINIC | Age: 34
End: 2019-01-17
Payer: COMMERCIAL

## 2019-01-17 VITALS
BODY MASS INDEX: 40.81 KG/M2 | HEART RATE: 72 BPM | RESPIRATION RATE: 20 BRPM | HEIGHT: 67 IN | SYSTOLIC BLOOD PRESSURE: 116 MMHG | WEIGHT: 260 LBS | DIASTOLIC BLOOD PRESSURE: 70 MMHG

## 2019-01-17 DIAGNOSIS — J45.40 MODERATE PERSISTENT ASTHMA WITHOUT COMPLICATION: ICD-10-CM

## 2019-01-17 DIAGNOSIS — J02.0 ACUTE STREPTOCOCCAL PHARYNGITIS: ICD-10-CM

## 2019-01-17 DIAGNOSIS — J02.9 ACUTE PHARYNGITIS, UNSPECIFIED ETIOLOGY: Primary | ICD-10-CM

## 2019-01-17 DIAGNOSIS — H69.80 DYSFUNCTION OF EUSTACHIAN TUBE, UNSPECIFIED LATERALITY: ICD-10-CM

## 2019-01-17 DIAGNOSIS — H66.002 ACUTE SUPPURATIVE OTITIS MEDIA OF LEFT EAR WITHOUT SPONTANEOUS RUPTURE OF TYMPANIC MEMBRANE, RECURRENCE NOT SPECIFIED: ICD-10-CM

## 2019-01-17 DIAGNOSIS — J30.89 SEASONAL ALLERGIC RHINITIS DUE TO OTHER ALLERGIC TRIGGER: ICD-10-CM

## 2019-01-17 PROBLEM — H69.90 EUSTACHIAN TUBE DYSFUNCTION: Status: ACTIVE | Noted: 2019-01-17

## 2019-01-17 LAB — S PYO AG THROAT QL: POSITIVE

## 2019-01-17 PROCEDURE — 3008F BODY MASS INDEX DOCD: CPT | Performed by: FAMILY MEDICINE

## 2019-01-17 PROCEDURE — 87880 STREP A ASSAY W/OPTIC: CPT | Performed by: FAMILY MEDICINE

## 2019-01-17 PROCEDURE — 99214 OFFICE O/P EST MOD 30 MIN: CPT | Performed by: FAMILY MEDICINE

## 2019-01-17 RX ORDER — AZITHROMYCIN 250 MG/1
TABLET, FILM COATED ORAL
Qty: 6 TABLET | Refills: 0 | Status: SHIPPED | OUTPATIENT
Start: 2019-01-17 | End: 2019-01-22

## 2019-01-17 RX ORDER — IPRATROPIUM BROMIDE 21 UG/1
SPRAY, METERED NASAL
Qty: 30 ML | Refills: 0 | Status: SHIPPED | OUTPATIENT
Start: 2019-01-17 | End: 2019-02-28 | Stop reason: ALTCHOICE

## 2019-01-17 NOTE — PROGRESS NOTES
Assessment/Plan:  1  Pharyngitis/sore throat, rapid strep was positive  2  Streptococcal pharyngitis, Z-Jarek ordered  3  Acute otitis media, Z-Jarek ordered  4  Allergic rhinitis, Atrovent nasal spray ordered  5  Eustachian tube dysfunction, Atrovent nasal spray ordered  6  Asthma, stable continue present therapy  7  Patient to return in 1 week if still symptoms      Eustachian tube dysfunction  Atrovent nasal spray ordered    Allergic rhinitis  Atrovent nasal spray ordered    Moderate persistent asthma  Stable continue present therapy       Diagnoses and all orders for this visit:    Acute pharyngitis, unspecified etiology  -     POCT rapid strepA  -     azithromycin (ZITHROMAX) 250 mg tablet; Take 2 tablets today then 1 tablet daily till finished    Acute streptococcal pharyngitis  -     azithromycin (ZITHROMAX) 250 mg tablet; Take 2 tablets today then 1 tablet daily till finished    Acute suppurative otitis media of left ear without spontaneous rupture of tympanic membrane, recurrence not specified  -     azithromycin (ZITHROMAX) 250 mg tablet; Take 2 tablets today then 1 tablet daily till finished    Dysfunction of Eustachian tube, unspecified laterality  -     ipratropium (ATROVENT) 0 03 % nasal spray; Use 2 sprays both nostrils 3 times a day as needed    Seasonal allergic rhinitis due to other allergic trigger  -     ipratropium (ATROVENT) 0 03 % nasal spray; Use 2 sprays both nostrils 3 times a day as needed    Moderate persistent asthma without complication          Subjective: Pt here with complains of sore throat x 4 days  LIANNE Paredes     Patient ID: aNkul Daley is a 29 y o  female  Earlier this week patient felt like she had upper respiratory/flu was seen at urgent care  Strep was negative at that time influenza A/B and RSV swab was negative  Patient did never started on her Tamiflu because of this  But patient is getting worse    She is having sore throat head congestion mild otalgia sneezing clear to off white rhinorrhea  Very minimal dry cough  Low-grade fever no chills or night sweats  The following portions of the patient's history were reviewed and updated as appropriate: allergies, current medications, past family history, past medical history, past social history, past surgical history and problem list     Review of Systems   Constitutional:        HPI   HENT:        HPI   Eyes: Negative  Respiratory:        HPI   Cardiovascular: Negative  Gastrointestinal: Negative  Endocrine: Negative  Genitourinary: Negative  Musculoskeletal: Negative  Skin: Negative  Allergic/Immunologic: Positive for environmental allergies  Neurological: Negative  Hematological: Negative  Psychiatric/Behavioral: Negative  Objective:      Vitals:    01/17/19 1553   BP: 116/70   BP Location: Left arm   Patient Position: Sitting   Cuff Size: Large   Pulse: 72   Resp: 20   Weight: 118 kg (260 lb)   Height: 5' 7" (1 702 m)          Physical Exam   Constitutional: She is oriented to person, place, and time  She appears well-developed and well-nourished  HENT:   Head: Normocephalic and atraumatic  Mouth/Throat: No oropharyngeal exudate  Both tympanic membranes are dull left TM with mild injection positive allergic turbinates positive clear postnasal drip positive pharyngeal injection negative exudate   Eyes: Pupils are equal, round, and reactive to light  Conjunctivae are normal  No scleral icterus  Neck: Neck supple  No thyromegaly present  Cardiovascular: Normal rate, regular rhythm and normal heart sounds  Pulmonary/Chest: Effort normal and breath sounds normal    Lymphadenopathy:     She has cervical adenopathy  Neurological: She is alert and oriented to person, place, and time  No cranial nerve deficit  Skin: Skin is warm and dry  Psychiatric: She has a normal mood and affect

## 2019-01-17 NOTE — PATIENT INSTRUCTIONS
Patient use salt water gargle may use over-the-counter lozenges    Return in 1 week if still symptoms

## 2019-01-21 ENCOUNTER — TELEPHONE (OUTPATIENT)
Dept: FAMILY MEDICINE CLINIC | Facility: CLINIC | Age: 34
End: 2019-01-21

## 2019-01-21 DIAGNOSIS — J02.0 ACUTE STREPTOCOCCAL PHARYNGITIS: Primary | ICD-10-CM

## 2019-01-21 RX ORDER — CEPHALEXIN 500 MG/1
CAPSULE ORAL
Qty: 21 CAPSULE | Refills: 0 | Status: SHIPPED | OUTPATIENT
Start: 2019-01-21 | End: 2019-01-28

## 2019-01-21 NOTE — TELEPHONE ENCOUNTER
Keflex was ordered 1 3 times a day for 1 week    If patient still symptoms next week she should return to office for re-evaluation

## 2019-01-21 NOTE — TELEPHONE ENCOUNTER
You told Eugenio Rodriguez to call in if she still was not feeling better  She said that she is still having symptoms, not as bad, but she still is not feeling well  Today is her last day of antibiotics  She would like to know if you wanted to continue the antibiotics longer or switch her to something else  She uses marla quinn  Please advise  Thank you

## 2019-02-28 ENCOUNTER — ANNUAL EXAM (OUTPATIENT)
Dept: OBGYN CLINIC | Facility: CLINIC | Age: 34
End: 2019-02-28
Payer: COMMERCIAL

## 2019-02-28 ENCOUNTER — TELEPHONE (OUTPATIENT)
Dept: OBGYN CLINIC | Facility: CLINIC | Age: 34
End: 2019-02-28

## 2019-02-28 VITALS
BODY MASS INDEX: 39.86 KG/M2 | SYSTOLIC BLOOD PRESSURE: 124 MMHG | WEIGHT: 263 LBS | HEIGHT: 68 IN | DIASTOLIC BLOOD PRESSURE: 78 MMHG

## 2019-02-28 DIAGNOSIS — Z30.09 ENCOUNTER FOR COUNSELING REGARDING CONTRACEPTION: ICD-10-CM

## 2019-02-28 DIAGNOSIS — Z01.419 ENCOUNTER FOR GYNECOLOGICAL EXAMINATION WITHOUT ABNORMAL FINDING: Primary | ICD-10-CM

## 2019-02-28 PROCEDURE — 99395 PREV VISIT EST AGE 18-39: CPT | Performed by: OBSTETRICS & GYNECOLOGY

## 2019-02-28 NOTE — TELEPHONE ENCOUNTER
Ethan Smiley,    Please label Mirena for patient  Can you double book her on 3/21/19 @ 2:40 patient coming at 2:30 for insertion  She is a  employee  She is aware of appt

## 2019-02-28 NOTE — PROGRESS NOTES
Shahbaz Santiago  1985      CC:  Yearly exam    S:  29 y o  female here for yearly exam  Her cycles are regular, not heavy or crampy  She is sexually active  She uses withdrawal intermittently for contraception  She is interested in an IUD  She is not planning on another pregnancy yet, her son just started to sleep through the night  She'd prefer an IUD that would lighten her menses significantly or stop them  LMP last week, has not been sexually active within last 4 weeks  Last Pap 2/3/2017 - Normal Cytology, Neg HPV        Current Outpatient Medications:     albuterol (VENTOLIN HFA) 90 mcg/act inhaler, Inhale 2 puffs every 4 (four) hours as needed  , Disp: , Rfl:     fluticasone-vilanterol (BREO ELLIPTA) 100-25 mcg/inh inhaler, Inhale 1 puff daily Rinse mouth after use , Disp: 3 Inhaler, Rfl: 3  Social History     Socioeconomic History    Marital status: /Civil Union     Spouse name: Not on file    Number of children: Not on file    Years of education: Not on file    Highest education level: Not on file   Occupational History    Occupation: [employer]     Employer: [employer]   Social Needs    Financial resource strain: Not on file    Food insecurity:     Worry: Not on file     Inability: Not on file    Transportation needs:     Medical: Not on file     Non-medical: Not on file   Tobacco Use    Smoking status: Former Smoker     Packs/day: 0 50     Years: 10 00     Pack years: 5 00     Last attempt to quit: 2017     Years since quittin 6    Smokeless tobacco: Never Used    Tobacco comment: Remote   A few cigarettes socially during college years   Substance and Sexual Activity    Alcohol use: Yes     Frequency: Monthly or less    Drug use: No    Sexual activity: Yes     Partners: Male     Birth control/protection: None     Comment: withdrawl   Lifestyle    Physical activity:     Days per week: Not on file     Minutes per session: Not on file    Stress: Not on file   Relationships    Social connections:     Talks on phone: Not on file     Gets together: Not on file     Attends Cheondoism service: Not on file     Active member of club or organization: Not on file     Attends meetings of clubs or organizations: Not on file     Relationship status: Not on file    Intimate partner violence:     Fear of current or ex partner: Not on file     Emotionally abused: Not on file     Physically abused: Not on file     Forced sexual activity: Not on file   Other Topics Concern    Not on file   Social History Narrative    Caffeine use     Family History   Problem Relation Age of Onset    Multiple sclerosis Mother     Skin cancer Mother     Cancer Mother     Diabetes Father     Other Father         Pericarditis    Asthma Maternal Grandmother    Oseas Husain / Judd Ram Diabetes Paternal Grandmother     Learning disabilities Other       Past Medical History:   Diagnosis Date    Asthma     had vaccine     Diabetes mellitus ([facility] [address] )     gestational DM diet controlled    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    only got  sperm tested, naturally conceived     Lumbar strain 7/16/2015    Varicella     chicken pox as child    Visual impairment     eyewear         O:  Blood pressure 124/78, height 5' 7 5" (1 715 m), weight 119 kg (263 lb), last menstrual period 02/19/2019, currently breastfeeding  Patient appears well and is not in distress  Neck is supple without masses  Breasts are symmetrical without mass, tenderness, nipple discharge, skin changes or adenopathy  Abdomen is soft and nontender without masses  External genitals are normal without lesions or rashes  Vagina is normal without discharge or bleeding  Cervix is normal without discharge or lesion  Uterus is normal, mobile, nontender without palpable mass  Adnexa are normal, nontender, without palpable mass  A:  Yearly exam      P:   Pap due 2022   Discussed IUD options - she is interested in [facility]  Scheduled 3/21 for insertion

## 2019-03-01 NOTE — TELEPHONE ENCOUNTER
Patient is already aware of date and time  I moved a patient to accommodate  I had Edmar Loco put her in for appt so all you have to do is label a IUD  Thanks!

## 2019-03-06 NOTE — TELEPHONE ENCOUNTER
Buy and bill Mirena labeled and placed in [facility] room  Pt is scheduled for insertion

## 2019-03-21 ENCOUNTER — OFFICE VISIT (OUTPATIENT)
Dept: OBGYN CLINIC | Facility: CLINIC | Age: 34
End: 2019-03-21
Payer: COMMERCIAL

## 2019-03-21 VITALS — SYSTOLIC BLOOD PRESSURE: 130 MMHG | DIASTOLIC BLOOD PRESSURE: 70 MMHG | BODY MASS INDEX: 37.5 KG/M2 | WEIGHT: 243 LBS

## 2019-03-21 DIAGNOSIS — Z30.430 ENCOUNTER FOR IUD INSERTION: Primary | ICD-10-CM

## 2019-03-21 PROCEDURE — 58300 INSERT INTRAUTERINE DEVICE: CPT | Performed by: OBSTETRICS & GYNECOLOGY

## 2019-03-21 NOTE — PROGRESS NOTES
Iud insertions  Date/Time: 3/21/2019 3:23 PM  Performed by: Amanda Dickens MD  Authorized by: Amanda Dickens MD     Consent:     Consent obtained:  Verbal and written    Consent given by:  Patient    Procedure risks and benefits discussed: yes      Patient questions answered: yes      Patient agrees, verbalizes understanding, and wants to proceed: yes      Educational handouts given: yes      Instructions and paperwork completed: yes    Procedure:     Pelvic exam performed: yes      Cervix cleaned and prepped: yes      Speculum placed in vagina: yes      Tenaculum applied to cervix: yes      Uterus sounded: yes      Uterus sound depth (cm):  9    IUD inserted with no complications: yes      IUD type:  Mirena    Strings trimmed: yes    Post-procedure:     Patient tolerated procedure well: yes      Patient will follow up after next period: yes    Comments:      /70 (BP Location: Left arm, Patient Position: Sitting, Cuff Size: Large)   Wt 110 kg (243 lb)   LMP 03/15/2019 (Exact Date)   Breastfeeding?  No   BMI 37 50 kg/m²

## 2019-06-21 ENCOUNTER — OFFICE VISIT (OUTPATIENT)
Dept: OBGYN CLINIC | Facility: CLINIC | Age: 34
End: 2019-06-21
Payer: COMMERCIAL

## 2019-06-21 VITALS — WEIGHT: 267 LBS | SYSTOLIC BLOOD PRESSURE: 100 MMHG | DIASTOLIC BLOOD PRESSURE: 64 MMHG | BODY MASS INDEX: 41.2 KG/M2

## 2019-06-21 DIAGNOSIS — N92.6 IRREGULAR BLEEDING: Primary | ICD-10-CM

## 2019-06-21 PROBLEM — J02.0 ACUTE STREPTOCOCCAL PHARYNGITIS: Status: RESOLVED | Noted: 2019-01-21 | Resolved: 2019-06-21

## 2019-06-21 PROCEDURE — 99213 OFFICE O/P EST LOW 20 MIN: CPT | Performed by: PHYSICIAN ASSISTANT

## 2019-10-04 ENCOUNTER — OFFICE VISIT (OUTPATIENT)
Dept: OBGYN CLINIC | Facility: CLINIC | Age: 34
End: 2019-10-04
Payer: COMMERCIAL

## 2019-10-04 VITALS — DIASTOLIC BLOOD PRESSURE: 72 MMHG | WEIGHT: 271 LBS | BODY MASS INDEX: 41.82 KG/M2 | SYSTOLIC BLOOD PRESSURE: 118 MMHG

## 2019-10-04 DIAGNOSIS — Z86.32 HISTORY OF GESTATIONAL DIABETES: Primary | ICD-10-CM

## 2019-10-04 DIAGNOSIS — Z30.432 ENCOUNTER FOR IUD REMOVAL: ICD-10-CM

## 2019-10-04 DIAGNOSIS — E78.2 ELEVATED TRIGLYCERIDES WITH HIGH CHOLESTEROL: ICD-10-CM

## 2019-10-04 DIAGNOSIS — Z31.69 ENCOUNTER FOR PRECONCEPTION CONSULTATION: ICD-10-CM

## 2019-10-04 PROCEDURE — 99213 OFFICE O/P EST LOW 20 MIN: CPT | Performed by: PHYSICIAN ASSISTANT

## 2019-10-04 PROCEDURE — 58301 REMOVE INTRAUTERINE DEVICE: CPT | Performed by: PHYSICIAN ASSISTANT

## 2019-10-04 NOTE — PROGRESS NOTES
Ford Tere  1985      CC:  IUD removal    S:  36 y o  female here for IUD removal   The reason for her IUD removal is desired fertility  We reviewed her history of gestational diabetes based on a fasting glucose of 95 during her 3hr gtt, and her other 3 values were normal  She was diet controlled throughout the pregnancy and delivered a 9 lb  2oz baby without complications  She had not had follow-up testing since her pregnancy  I suggested ordering a hemoglobin A1c and a lipid profile (due to her hx of hypertriglyceridemia last year)  I asked her to start a prenatal vitamin daily  We reviewed the risks of IUD removal including pain and irregular bleeding  O:   Blood pressure 118/72, weight 123 kg (271 lb), not currently breastfeeding  Patient appears well and is in no distress  Abdomen is soft and nontender  External genitals are normal without rash or lesion  Vagina is normal without discharge  Cervix is normal without discharge or lesion  IUD strings are normal      PROCEDURE:   IUD strings were grasped with a ring forceps and the IUD was removed by gently pulling on the strings  The IUD was removed in its entirety and was discarded  There were no complications and the patient tolerated the procedure well  A:   IUD removal     Preconception counseling  P:  Return for yearly exam or sooner as needed

## 2019-10-22 ENCOUNTER — OFFICE VISIT (OUTPATIENT)
Dept: URGENT CARE | Facility: MEDICAL CENTER | Age: 34
End: 2019-10-22
Payer: COMMERCIAL

## 2019-10-22 VITALS
OXYGEN SATURATION: 99 % | WEIGHT: 277.12 LBS | HEIGHT: 67 IN | BODY MASS INDEX: 43.49 KG/M2 | TEMPERATURE: 97.7 F | DIASTOLIC BLOOD PRESSURE: 86 MMHG | RESPIRATION RATE: 18 BRPM | HEART RATE: 68 BPM | SYSTOLIC BLOOD PRESSURE: 124 MMHG

## 2019-10-22 DIAGNOSIS — S65.510A LACERATION OF BLOOD VESSEL OF RIGHT INDEX FINGER, INITIAL ENCOUNTER: Primary | ICD-10-CM

## 2019-10-22 PROCEDURE — S9088 SERVICES PROVIDED IN URGENT: HCPCS | Performed by: FAMILY MEDICINE

## 2019-10-22 PROCEDURE — 12001 RPR S/N/AX/GEN/TRNK 2.5CM/<: CPT | Performed by: FAMILY MEDICINE

## 2019-10-22 PROCEDURE — 99213 OFFICE O/P EST LOW 20 MIN: CPT | Performed by: FAMILY MEDICINE

## 2019-10-22 NOTE — PATIENT INSTRUCTIONS
Laceration was approximated with Dermabond followed by Steri-Strips  Right index finger was placed in a splint for at least 24 hours  Advised patient to observe for any signs of wound infection 2-3 days  Skin Adhesive Care   WHAT YOU NEED TO KNOW:   Skin adhesive is medical glue used to close wounds  It is a substitute for staples and stitches  Skin adhesive wound closures take less time and do not require anesthesia  You have less pain and a lower risk of infection than with staples or stitches  Skin adhesive will fall off after the wound is healed  DISCHARGE INSTRUCTIONS:   Self-care:   · Keep your wound clean and dry  for 1 to 5 days  You can shower 24 hours after the skin adhesive is applied  Lightly pat your wound dry after you shower  · Do not soak  your wound in water, such as in a bath or hot tub  · Do not scrub  your wound or pick at the adhesive  This can make your wound reopen  · Do not apply ointments  to your wound  These include antibiotic and other ointments that contain petroleum jelly  These products will remove skin adhesive and reopen your wound  Follow up with your healthcare provider as directed:  Write down your questions so you remember to ask them during your visits  Contact your healthcare provider if:   · You have a fever  · Your wound is red and warm to touch  · You have questions or concerns about your condition or care  Return to the emergency department if:   · Your wound has fluid draining from it  · Your wound opens  © 2017 [address] Information is for End User's use only and may not be sold, redistributed or otherwise used for commercial purposes  All illustrations and images included in CareNotes® are the copyrighted property of [employer]  or Michael Waller  The above information is an  only  It is not intended as medical advice for individual conditions or treatments   Talk to your doctor, nurse or pharmacist before following any medical regimen to see if it is safe and effective for you

## 2019-10-22 NOTE — PROGRESS NOTES
[facility]        NAME: Shira Mooney is a 36 y o  female  : 1985    MRN: [id number]  DATE: 2019  TIME: 10:06 AM    Assessment and Plan   Laceration of blood vessel of right index finger, initial encounter [S65 510A]  1  Laceration of blood vessel of right index finger, initial encounter           Patient Instructions       Follow up with PCP in 3-5 days  Proceed to  ER if symptoms worsen  Chief Complaint     Chief Complaint   Patient presents with    Laceration     Patient is here with laceration to right index finger dorsal aspect on a can today at 7:00 am  Superficial and no active bleeding  Last Tdap 6/15/2018  History of Present Illness       52-year-old female here today because she sustained laceration over right index finger earlier today with a can  She was able to wash out the wound, place pressure and came to urgent care for assessment  Patient is currently up-to-date on her tetanus shot  Review of Systems   Review of Systems   Skin: Positive for wound           Current Medications       Current Outpatient Medications:     albuterol (VENTOLIN HFA) 90 mcg/act inhaler, Inhale 2 puffs every 4 (four) hours as needed  , Disp: , Rfl:     fluticasone-vilanterol (BREO ELLIPTA) 100-25 mcg/inh inhaler, Inhale 1 puff daily Rinse mouth after use , Disp: 3 Inhaler, Rfl: 3    Prenatal MV-Min-Fe Fum-FA-DHA (PRENATAL 1 PO), Take 1 tablet by mouth daily, Disp: , Rfl:     levonorgestrel (MIRENA) 20 MCG/24HR IUD, 1 each by Intrauterine route once, Disp: , Rfl:     Current Allergies     Allergies as of 10/22/2019 - Reviewed 10/22/2019   Allergen Reaction Noted    Sulfa antibiotics Other (See Comments) 2012            The following portions of the patient's history were reviewed and updated as appropriate: allergies, current medications, past family history, past medical history, past social history, past surgical history and problem list      Past Medical History: Diagnosis Date    Asthma     had vaccine     Diabetes mellitus ([facility] [address] )     gestational DM diet controlled    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    only got  sperm tested, naturally conceived     Lumbar strain 7/16/2015    Varicella     chicken pox as child    Visual impairment     eyewear        Past Surgical History:   Procedure Laterality Date    FOOT SURGERY      WISDOM TOOTH EXTRACTION         Family History   Problem Relation Age of Onset    Multiple sclerosis Mother     Skin cancer Mother     Cancer Mother     Diabetes Father     Other Father         Pericarditis    Asthma Maternal Grandmother     Miscarriages / Stillbirths Sister     Diabetes Paternal Grandmother     Learning disabilities Other          Medications have been verified  Objective   /86   Pulse 68   Temp 97 7 °F (36 5 °C) (Tympanic)   Resp 18   Ht 5' 7" (1 702 m)   Wt 126 kg (277 lb 1 9 oz)   LMP  (LMP Unknown) Comment: Due now just had IUD removed  SpO2 99%   BMI 43 40 kg/m²          Physical Exam     Physical Exam   Skin:   Right hand:  Dorsal aspect, right index finger reveals a laceration measuring about 2 cm in length  It is located just distal to the 1st MCP joint  It extends to the dermis  There is no arteries, nerves, veins for tenderness seem to be affected  Patient exhibits good tactile sensation and capillary refill distal to the injury  Laceration repair  Date/Time: 10/22/2019 10:07 AM  Performed by: Kenneth Enriquez MD  Authorized by: Kenneth Enriquez MD   Consent: Verbal consent obtained    Consent given by: patient  Patient identity confirmed: verbally with patient  Body area: upper extremity  Location details: right index finger  Laceration length: 2 cm  Foreign bodies: no foreign bodies  Tendon involvement: none  Nerve involvement: none    Wound Dehiscence:  Superficial Wound Dehiscence: simple closure      Procedure Details:  Skin closure: glue  Approximation: close  Patient tolerance: Patient tolerated the procedure well with no immediate complications

## 2019-10-22 NOTE — LETTER
October 22, 2019     Patient: Kavin Hi   YOB: 1985   Date of Visit: 10/22/2019       To Whom It May Concern: It is my medical opinion that Kavin Hi may return to work on 10/23/2019  If you have any questions or concerns, please don't hesitate to call           Sincerely,        Natalie Neal MD    CC: No Recipients

## 2019-10-22 NOTE — PROGRESS NOTES
Assessment/Plan    Laceration on Right Index Finger     -Wound closed with dermabond glue    - Reinforced with steristrips     -Held in finger long brace for stability     Subjective:     Patient ID: Zulay Howe is a 36 y o  female  Reason For Visit / Chief Complaint  Chief Complaint   Patient presents with    Laceration     Patient is here with laceration to right index finger dorsal aspect on a can today at 7:00 am  Superficial and no active bleeding  Last Tdap 6/15/2018  CHEVY SILVESTRE  Is a 37 y/o female presenting with a finger laceration occurring this morning  Patient states that she cut her hand on a metal can  She had cleaned the wound and stopped the bleeding  Patient is up to date on her tetanus shot  Patient denies any weakness or numbness in the finger  Past Medical History:   Diagnosis Date    Asthma     had vaccine     Diabetes mellitus (HCC)     gestational DM diet controlled    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    only got  sperm tested, naturally conceived     Lumbar strain 7/16/2015    Varicella     chicken pox as child    Visual impairment     eyewear        Past Surgical History:   Procedure Laterality Date    FOOT SURGERY      WISDOM TOOTH EXTRACTION         Family History   Problem Relation Age of Onset    Multiple sclerosis Mother     Skin cancer Mother     Cancer Mother     Diabetes Father     Other Father         Pericarditis    Asthma Maternal Grandmother    Jaime  / Stillbirths Sister     Diabetes Paternal Grandmother     Learning disabilities Other        Review of Systems   Musculoskeletal: Negative for joint swelling  Skin: Positive for wound (just distal to the right MCP of index finger)  Negative for color change, pallor and rash  Neurological: Negative for weakness and numbness         Objective:    /86   Pulse 68   Temp 97 7 °F (36 5 °C) (Tympanic)   Resp 18   Ht 5' 7" (1 702 m)   Wt 126 kg (277 lb 1 9 oz)   LMP  (LMP Unknown) Comment: Due now just had IUD removed  SpO2 99%   BMI 43 40 kg/m²     Physical Exam   Constitutional: She appears well-developed and well-nourished  No distress  Musculoskeletal: Normal range of motion  She exhibits tenderness (near lacerations edge)  She exhibits no edema or deformity  Right hand: She exhibits tenderness  She exhibits normal range of motion, no bony tenderness, no deformity, no laceration and no swelling  Normal sensation noted  Normal strength noted  Neurological: No sensory deficit  Skin: Skin is warm and dry  No erythema  Nursing note and vitals reviewed

## 2019-11-25 DIAGNOSIS — J44.9 CHRONIC OBSTRUCTIVE PULMONARY DISEASE, UNSPECIFIED COPD TYPE (HCC): Primary | ICD-10-CM

## 2019-11-25 RX ORDER — ALBUTEROL SULFATE 90 UG/1
2 AEROSOL, METERED RESPIRATORY (INHALATION) EVERY 4 HOURS PRN
Qty: 1 INHALER | Refills: 0 | Status: SHIPPED | OUTPATIENT
Start: 2019-11-25 | End: 2019-12-20 | Stop reason: SDUPTHER

## 2019-12-10 ENCOUNTER — OFFICE VISIT (OUTPATIENT)
Dept: FAMILY MEDICINE CLINIC | Facility: CLINIC | Age: 34
End: 2019-12-10
Payer: COMMERCIAL

## 2019-12-10 VITALS
WEIGHT: 272 LBS | SYSTOLIC BLOOD PRESSURE: 118 MMHG | HEIGHT: 67 IN | TEMPERATURE: 98.8 F | BODY MASS INDEX: 42.69 KG/M2 | DIASTOLIC BLOOD PRESSURE: 84 MMHG

## 2019-12-10 DIAGNOSIS — H65.01 NON-RECURRENT ACUTE SEROUS OTITIS MEDIA OF RIGHT EAR: ICD-10-CM

## 2019-12-10 DIAGNOSIS — Z23 ENCOUNTER FOR IMMUNIZATION: ICD-10-CM

## 2019-12-10 DIAGNOSIS — J45.41 MODERATE PERSISTENT ASTHMA WITH ACUTE EXACERBATION: Primary | ICD-10-CM

## 2019-12-10 PROCEDURE — 99213 OFFICE O/P EST LOW 20 MIN: CPT | Performed by: FAMILY MEDICINE

## 2019-12-10 PROCEDURE — 90471 IMMUNIZATION ADMIN: CPT

## 2019-12-10 PROCEDURE — 90732 PPSV23 VACC 2 YRS+ SUBQ/IM: CPT

## 2019-12-10 PROCEDURE — 94640 AIRWAY INHALATION TREATMENT: CPT | Performed by: FAMILY MEDICINE

## 2019-12-10 PROCEDURE — 3008F BODY MASS INDEX DOCD: CPT | Performed by: FAMILY MEDICINE

## 2019-12-10 RX ORDER — ALBUTEROL SULFATE 2.5 MG/3ML
2.5 SOLUTION RESPIRATORY (INHALATION) ONCE
Status: COMPLETED | OUTPATIENT
Start: 2019-12-10 | End: 2019-12-10

## 2019-12-10 RX ORDER — AZITHROMYCIN 250 MG/1
TABLET, FILM COATED ORAL
Qty: 6 TABLET | Refills: 0 | Status: SHIPPED | OUTPATIENT
Start: 2019-12-10 | End: 2019-12-15

## 2019-12-10 RX ADMIN — ALBUTEROL SULFATE 2.5 MG: 2.5 SOLUTION RESPIRATORY (INHALATION) at 10:45

## 2019-12-10 NOTE — ASSESSMENT & PLAN NOTE
Patient appears have a viral infection that has started the asthma exacerbation  Recommend that she use her albuterol, which she has been doing  She improved with the nebulizer treatment here today  Continue on Breo  Add Zithromax  Follow with Pulmonary in the future  Of note today she did get Pneumovax

## 2019-12-10 NOTE — PATIENT INSTRUCTIONS
Problem List Items Addressed This Visit     Moderate persistent asthma - Primary     Patient appears have a viral infection that has started the asthma exacerbation  Recommend that she use her albuterol, which she has been doing  She improved with the nebulizer treatment here today  Continue on Breo  Add Zithromax  Follow with Pulmonary in the future  Of note today she did get Pneumovax  Relevant Medications    albuterol inhalation solution 2 5 mg (Completed)    azithromycin (ZITHROMAX) 250 mg tablet    Other Relevant Orders    Mini neb      Other Visit Diagnoses     Non-recurrent acute serous otitis media of right ear        Right TM slightly reddened and bulging  Zithromax      Relevant Medications    azithromycin (ZITHROMAX) 250 mg tablet    Encounter for immunization        Relevant Orders    PNEUMOCOCCAL POLYSACCHARIDE VACCINE 23-VALENT =>1YO SQ IM (Completed)

## 2019-12-10 NOTE — LETTER
December 10, 2019     Patient: Be Whatley   YOB: 1985   Date of Visit: 12/10/2019       To Whom it May Concern:    Be Whatley is under my professional care  She was seen in my office on 12/10/2019  She may return to work on [id number]  Out from [id number]  If you have any questions or concerns, please don't hesitate to call           Sincerely,          Alejandra Hankins MD        CC: No Recipients

## 2019-12-10 NOTE — PROGRESS NOTES
Assessment and Plan:    Problem List Items Addressed This Visit     Moderate persistent asthma - Primary     Patient appears have a viral infection that has started the asthma exacerbation  Recommend that she use her albuterol, which she has been doing  She improved with the nebulizer treatment here today  Continue on Breo  Add Zithromax  Follow with Pulmonary in the future  Of note today she did get Pneumovax  Relevant Medications    albuterol inhalation solution 2 5 mg (Completed)    azithromycin (ZITHROMAX) 250 mg tablet      Other Visit Diagnoses     Non-recurrent acute serous otitis media of right ear        Right TM slightly reddened and bulging  Zithromax  Relevant Medications    azithromycin (ZITHROMAX) 250 mg tablet    Encounter for immunization        Relevant Orders    PNEUMOCOCCAL POLYSACCHARIDE VACCINE 23-VALENT =>1YO SQ IM (Completed)                 Diagnoses and all orders for this visit:    Moderate persistent asthma with acute exacerbation  -     albuterol inhalation solution 2 5 mg  -     azithromycin (ZITHROMAX) 250 mg tablet; Take 2 tablets on day 1, then 1 tablet daily days 2 through 5    Non-recurrent acute serous otitis media of right ear  Comments:  Right TM slightly reddened and bulging  Zithromax  Orders:  -     azithromycin (ZITHROMAX) 250 mg tablet; Take 2 tablets on day 1, then 1 tablet daily days 2 through 5    Encounter for immunization  -     PNEUMOCOCCAL POLYSACCHARIDE VACCINE 23-VALENT =>1YO SQ IM    Other orders  -     Mini neb              Subjective:      Patient ID: Dayan Vera is a 36 y o  female  CC:    Chief Complaint   Patient presents with    Fever     Pt states she had fever sunday and monday, highest being 102  Pt will need note for work   Sore Throat       HPI:    Patient has had a fever recently  Please see chart    Patient does have a significant amount of congestion in the head, with postnasal drip, sore throat secondary to that, as well as now chest symptoms  She started with her child having illness after vaccinations recently  102 5 over weekend  Using rescue inhaler more lately with this  Meds: Tylenol and Motrin  Patient did have Mirena in place recently, but she it was removed with consultation with her OBGYN  Patient was having some issues with the medication, more specifically that the Mirena was causing her to have some emotional changes, as well as significant weight gain  She currently has her menstrual cycle  Is not pregnant  The following portions of the patient's history were reviewed and updated as appropriate: allergies, current medications, past family history, past medical history, past social history, past surgical history and problem list       Review of Systems   Constitutional: Positive for fatigue  HENT: Positive for congestion, postnasal drip, rhinorrhea, sinus pressure and sinus pain  Negative for ear pain  Eyes: Negative  Respiratory: Positive for chest tightness and shortness of breath  Cardiovascular: Negative  Gastrointestinal: Negative  All other systems reviewed and are negative  Data to review:       Objective:    Vitals:    12/10/19 1000   BP: 118/84   BP Location: Left arm   Patient Position: Sitting   Cuff Size: Adult   Temp: 98 8 °F (37 1 °C)   TempSrc: Tympanic   Weight: 123 kg (272 lb)   Height: 5' 7" (1 702 m)        Physical Exam   Constitutional: She appears well-developed and well-nourished  HENT:   Head: Normocephalic and atraumatic  Right Ear: Hearing and ear canal normal  A middle ear effusion is present  Left Ear: Hearing, tympanic membrane and ear canal normal    Mouth/Throat: Uvula is midline, oropharynx is clear and moist and mucous membranes are normal  Tonsils are 0 on the right  Tonsils are 0 on the left  No tonsillar exudate  Neck: Normal range of motion  Neck supple  Cardiovascular: Normal rate, regular rhythm and normal heart sounds  Pulses:       Carotid pulses are 2+ on the right side, and 2+ on the left side  Pulmonary/Chest: Effort normal and breath sounds normal  No respiratory distress (Slightly decreased airflow)  She has no wheezes  She has no rales  She exhibits no tenderness  Nursing note and vitals reviewed  Mini neb  Performed by: Shaka King MD  Authorized by: Shaka King MD     Number of treatments:  1  Treatment 1:   Pre-Procedure     Symptoms:  Wheezing, difficulty breathing and shortness of breath    Medication Administered:  Albuterol 2 5 mg  Post-Procedure     Symptoms:  Wheezing    Lung sounds:  Improved airflow          BMI Counseling: Body mass index is 42 6 kg/m²  The BMI is above normal  Nutrition recommendations include decreasing portion sizes, encouraging healthy choices of fruits and vegetables, decreasing fast food intake, consuming healthier snacks, limiting drinks that contain sugar, moderation in carbohydrate intake, increasing intake of lean protein, reducing intake of saturated and trans fat and reducing intake of cholesterol  Exercise recommendations include exercising 3-5 times per week  No pharmacotherapy was ordered

## 2019-12-13 ENCOUNTER — TELEPHONE (OUTPATIENT)
Dept: FAMILY MEDICINE CLINIC | Facility: CLINIC | Age: 34
End: 2019-12-13

## 2019-12-13 DIAGNOSIS — J45.41 MODERATE PERSISTENT ASTHMA WITH ACUTE EXACERBATION: Primary | ICD-10-CM

## 2019-12-13 NOTE — TELEPHONE ENCOUNTER
PATIENT STATES THAT WHEN SHE WAS JUST HERE FOR HER APPOINTMENT SHE SAID THAT SHE DIDN'T NEED HER ALBUTEROL FOR NEB TREATMENTS AT HOME  SHE CHECKED AT HOME AND SHE IS OUT  SHE WAS HOPING A PRESCRIPTION COULD BE SENT TO [facility] PLEASE  THANK YOU!

## 2019-12-16 RX ORDER — ALBUTEROL SULFATE 2.5 MG/3ML
2.5 SOLUTION RESPIRATORY (INHALATION) EVERY 6 HOURS PRN
Qty: 60 VIAL | Refills: 5 | Status: SHIPPED | OUTPATIENT
Start: 2019-12-16

## 2019-12-20 ENCOUNTER — OFFICE VISIT (OUTPATIENT)
Dept: PULMONOLOGY | Facility: CLINIC | Age: 34
End: 2019-12-20
Payer: COMMERCIAL

## 2019-12-20 VITALS
DIASTOLIC BLOOD PRESSURE: 80 MMHG | OXYGEN SATURATION: 97 % | HEART RATE: 92 BPM | WEIGHT: 272 LBS | BODY MASS INDEX: 42.69 KG/M2 | TEMPERATURE: 97.3 F | HEIGHT: 67 IN | SYSTOLIC BLOOD PRESSURE: 118 MMHG | RESPIRATION RATE: 18 BRPM

## 2019-12-20 DIAGNOSIS — J44.9 CHRONIC OBSTRUCTIVE PULMONARY DISEASE, UNSPECIFIED COPD TYPE (HCC): ICD-10-CM

## 2019-12-20 DIAGNOSIS — J45.40 MODERATE PERSISTENT ASTHMA WITHOUT COMPLICATION: ICD-10-CM

## 2019-12-20 PROCEDURE — 99213 OFFICE O/P EST LOW 20 MIN: CPT | Performed by: INTERNAL MEDICINE

## 2019-12-20 RX ORDER — ALBUTEROL SULFATE 90 UG/1
2 AEROSOL, METERED RESPIRATORY (INHALATION) EVERY 4 HOURS PRN
Qty: 3 INHALER | Refills: 3 | Status: SHIPPED | OUTPATIENT
Start: 2019-12-20 | End: 2021-12-09 | Stop reason: SDUPTHER

## 2019-12-20 RX ORDER — FLUTICASONE FUROATE AND VILANTEROL 100; 25 UG/1; UG/1
1 POWDER RESPIRATORY (INHALATION) DAILY
Qty: 3 INHALER | Refills: 3 | Status: SHIPPED | OUTPATIENT
Start: 2019-12-20 | End: 2021-12-09 | Stop reason: SDUPTHER

## 2019-12-20 NOTE — PROGRESS NOTES
Progress note - Pulmonary Medicine   Vaishali Huerta 36 y o  female MRN: [id number]       Impression & Plan: Moderate persistent asthma  · Controlled on Breo Ellipta with rescue albuterol  · Rarely needs rescue inhaler if she takes her Breo regularly  · Recently had an upper respiratory infection with middle ear involvement  She had significant wheezing and use the nebulizer and took is through [employer]  Symptoms now completely resolved  · Continue annual follow-up      ______________________________________________________________________    HPI:    Vaishali Huerta presents today for follow-up of moderate persistent asthma which is well controlled on Breo Ellipta  Recently she had mild exacerbation in the setting of URI which was likely viral   She had middle ear involvement, wheezing, cough and congestion  The rest of her family was also sick  She did see her primary care physician who suggested she take her nebulizer and gave her a Z-Jarek  Her symptoms have since resolved completely  She takes Breo daily  Her albuterol she uses quite infrequently  She realized when she took her albuterol nebulizer that it was  2 years ago  Otherwise has been healthy  No chronic medical issues  No chest pain  No chronic cough or exercise intolerance  She denies GERD symptoms  She does have seasonal allergies which she treats with over-the-counter antihistamines as needed during allergy season      Current Medications:    Current Outpatient Medications:     albuterol (VENTOLIN HFA) 90 mcg/act inhaler, Inhale 2 puffs every 4 (four) hours as needed for wheezing or shortness of breath, Disp: 3 Inhaler, Rfl: 3    fluticasone-vilanterol (BREO ELLIPTA) 100-25 mcg/inh inhaler, Inhale 1 puff daily Rinse mouth after use , Disp: 3 Inhaler, Rfl: 3    Prenatal MV-Min-Fe Fum-FA-DHA (PRENATAL 1 PO), Take 1 tablet by mouth daily, Disp: , Rfl:     albuterol (2 5 mg/3 mL) 0 083 % nebulizer solution, Take 1 vial (2 5 mg total) by nebulization every 6 (six) hours as needed for wheezing or shortness of breath (Patient not taking: Reported on 2019), Disp: 60 vial, Rfl: 5    Review of Systems:  No fever or chills  No weight or appetite changes  No abdominal pain  No dysuria  No headache or visual changes  No other neurologic symptoms  No arthralgias or myalgias  Past medical history, surgical history, and family history were reviewed and updated as appropriate    Social history updates:  Social History     Tobacco Use   Smoking Status Former Smoker    Packs/day: 0 50    Years: 12 00    Pack years: 6 00    Start date:     Last attempt to quit: 2017    Years since quittin 4   Smokeless Tobacco Never Used       PhysicalExamination:  Vitals:   /80   Pulse 92   Temp (!) 97 3 °F (36 3 °C)   Resp 18   Ht 5' 7" (1 702 m)   Wt 123 kg (272 lb)   SpO2 97%   BMI 42 60 kg/m²     Gen: Comfortable  Non-labored  HEENT: PERRL  O/P: clear  moist  Neck: Trachea is midline  No JVD  No adenopathy  Chest:  Lungs are clear bilaterally without wheeze  Cardiac:  Regular  no murmur  Abdomen: Soft and nontender  Bowel sounds are present    Extremities: No edema    Diagnostic Data:  Labs:  No new laboratory or pulmonary diagnostic data    Rosetta Flynn MD

## 2020-01-18 ENCOUNTER — OFFICE VISIT (OUTPATIENT)
Dept: URGENT CARE | Facility: CLINIC | Age: 35
End: 2020-01-18
Payer: COMMERCIAL

## 2020-01-18 ENCOUNTER — APPOINTMENT (OUTPATIENT)
Dept: RADIOLOGY | Facility: CLINIC | Age: 35
End: 2020-01-18
Payer: COMMERCIAL

## 2020-01-18 VITALS
TEMPERATURE: 98.1 F | HEIGHT: 67 IN | OXYGEN SATURATION: 96 % | DIASTOLIC BLOOD PRESSURE: 82 MMHG | SYSTOLIC BLOOD PRESSURE: 133 MMHG | WEIGHT: 271 LBS | BODY MASS INDEX: 42.53 KG/M2 | HEART RATE: 84 BPM | RESPIRATION RATE: 18 BRPM

## 2020-01-18 DIAGNOSIS — R05.9 COUGH: ICD-10-CM

## 2020-01-18 DIAGNOSIS — R05.9 COUGH: Primary | ICD-10-CM

## 2020-01-18 PROCEDURE — G0382 LEV 3 HOSP TYPE B ED VISIT: HCPCS | Performed by: PHYSICIAN ASSISTANT

## 2020-01-18 PROCEDURE — 71046 X-RAY EXAM CHEST 2 VIEWS: CPT

## 2020-01-18 NOTE — PROGRESS NOTES
[facility]        NAME: Kamala Hatch is a 36 y o  female  : 1985    MRN: [id number]  DATE: 2020  TIME: 11:29 AM    Assessment and Plan   Cough [R05]  1  Cough  XR chest pa & lateral         Patient Instructions     C/w OTC tylenol/motrin, ice/heat, light stretching, massage, OTC icyhot, patches, etc   Discussed etiology is most likely muscular  Follow up with PCP in 3-5 days  Proceed to  ER if symptoms worsen  Chief Complaint     Chief Complaint   Patient presents with    Shoulder Pain     few days woke with pain under left shoulder blade, getting worse especially with cough         History of Present Illness       Patient w/ PMH significant for asthma presents with complaint of left upper back pain x 4 days  Pt reports that she woke up one morning with pain under her left scapula  Pt reports that the pain is sharp and constant and seems to be worsening  Pt reports sharp pain in her left upper back with deep breaths  Pt also states that she has had a lingering cough from a few weeks ago when she had a URI and ear infection  Pt states that she otherwise feels well and denies fever, chills, night sweats, chest pain, dyspnea, and SOB  Review of Systems   Review of Systems   Constitutional: Negative for chills, fatigue and fever  Respiratory: Positive for cough  Negative for chest tightness, shortness of breath and wheezing  Cardiovascular: Negative for chest pain and palpitations  Musculoskeletal: Positive for back pain  Negative for myalgias  Skin: Negative for color change, rash and wound  Neurological: Negative for headaches  All other systems reviewed and are negative          Current Medications       Current Outpatient Medications:     albuterol (2 5 mg/3 mL) 0 083 % nebulizer solution, Take 1 vial (2 5 mg total) by nebulization every 6 (six) hours as needed for wheezing or shortness of breath, Disp: 60 vial, Rfl: 5    albuterol (VENTOLIN HFA) 90 mcg/act inhaler, Inhale 2 puffs every 4 (four) hours as needed for wheezing or shortness of breath, Disp: 3 Inhaler, Rfl: 3    fluticasone-vilanterol (BREO ELLIPTA) 100-25 mcg/inh inhaler, Inhale 1 puff daily Rinse mouth after use , Disp: 3 Inhaler, Rfl: 3    Prenatal MV-Min-Fe Fum-FA-DHA (PRENATAL 1 PO), Take 1 tablet by mouth daily, Disp: , Rfl:     Current Allergies     Allergies as of 01/18/2020 - Reviewed 01/18/2020   Allergen Reaction Noted    Sulfa antibiotics Other (See Comments) 08/23/2012            The following portions of the patient's history were reviewed and updated as appropriate: allergies, current medications, past family history, past medical history, past social history, past surgical history and problem list      Past Medical History:   Diagnosis Date    Asthma     had vaccine     Diabetes mellitus ([facility] [address] )     gestational DM diet controlled    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    only got  sperm tested, naturally conceived     Lumbar strain 7/16/2015    Varicella     chicken pox as child    Visual impairment     eyewear        Past Surgical History:   Procedure Laterality Date    FOOT SURGERY      WISDOM TOOTH EXTRACTION         Family History   Problem Relation Age of Onset    Multiple sclerosis Mother     Skin cancer Mother     Cancer Mother     Diabetes Father     Other Father         Pericarditis    Asthma Maternal Grandmother     Miscarriages / Stillbirths Sister     Diabetes Paternal Grandmother     Learning disabilities Other          Medications have been verified  Objective   /82   Pulse 84   Temp 98 1 °F (36 7 °C) (Tympanic)   Resp 18   Ht 5' 7" (1 702 m)   Wt 123 kg (271 lb)   SpO2 96%   BMI 42 44 kg/m²        Physical Exam     Physical Exam   Constitutional: She is oriented to person, place, and time  She appears well-developed and well-nourished  No distress  HENT:   Head: Normocephalic and atraumatic     Eyes: Pupils are equal, round, and reactive to light  Conjunctivae and EOM are normal    Neck: Normal range of motion  Neck supple  Cardiovascular: Normal rate, regular rhythm and normal heart sounds  Pulmonary/Chest: Effort normal and breath sounds normal  No stridor  No respiratory distress  She has no wheezes  She has no rales  Musculoskeletal: Normal range of motion  She exhibits tenderness  T-spine: no skin changes; TTP over left PVMs, medial to scapula; FAROM of UE, discomfort w/ overhead reach   Lymphadenopathy:     She has no cervical adenopathy  Neurological: She is alert and oriented to person, place, and time  No cranial nerve deficit or sensory deficit  Skin: Skin is warm and dry  Capillary refill takes less than 2 seconds  No rash noted  She is not diaphoretic  Psychiatric: She has a normal mood and affect  Her behavior is normal  Thought content normal    Nursing note and vitals reviewed

## 2020-02-18 ENCOUNTER — TRANSCRIBE ORDERS (OUTPATIENT)
Dept: ADMINISTRATIVE | Facility: HOSPITAL | Age: 35
End: 2020-02-18

## 2020-02-18 ENCOUNTER — APPOINTMENT (OUTPATIENT)
Dept: LAB | Facility: HOSPITAL | Age: 35
End: 2020-02-18

## 2020-02-18 DIAGNOSIS — Z01.84 IMMUNITY STATUS TESTING: Primary | ICD-10-CM

## 2020-02-18 DIAGNOSIS — Z01.84 IMMUNITY STATUS TESTING: ICD-10-CM

## 2020-02-18 PROCEDURE — 36415 COLL VENOUS BLD VENIPUNCTURE: CPT

## 2020-02-18 PROCEDURE — 86765 RUBEOLA ANTIBODY: CPT

## 2020-02-20 LAB — MEV IGG SER QL: NORMAL

## 2020-03-02 ENCOUNTER — APPOINTMENT (OUTPATIENT)
Dept: LAB | Facility: HOSPITAL | Age: 35
End: 2020-03-02

## 2020-03-02 DIAGNOSIS — Z01.84 IMMUNITY STATUS TESTING: ICD-10-CM

## 2020-03-02 PROCEDURE — 86787 VARICELLA-ZOSTER ANTIBODY: CPT

## 2020-03-02 PROCEDURE — 36415 COLL VENOUS BLD VENIPUNCTURE: CPT

## 2020-03-02 PROCEDURE — 86735 MUMPS ANTIBODY: CPT

## 2020-03-03 LAB — MUV IGG SER QL: NORMAL

## 2020-03-05 LAB — VZV IGG SER IA-ACNC: NORMAL

## 2020-03-16 ENCOUNTER — AMB VIDEO VISIT (OUTPATIENT)
Dept: URGENT CARE | Age: 35
End: 2020-03-16

## 2020-03-16 ENCOUNTER — OFFICE VISIT (OUTPATIENT)
Dept: URGENT CARE | Facility: MEDICAL CENTER | Age: 35
End: 2020-03-16
Payer: COMMERCIAL

## 2020-03-16 VITALS
BODY MASS INDEX: 41.59 KG/M2 | HEIGHT: 67 IN | DIASTOLIC BLOOD PRESSURE: 80 MMHG | WEIGHT: 265 LBS | RESPIRATION RATE: 16 BRPM | OXYGEN SATURATION: 98 % | SYSTOLIC BLOOD PRESSURE: 120 MMHG | TEMPERATURE: 99.8 F | HEART RATE: 90 BPM

## 2020-03-16 DIAGNOSIS — R68.89 FLU-LIKE SYMPTOMS: Primary | ICD-10-CM

## 2020-03-16 DIAGNOSIS — J06.9 ACUTE RESPIRATORY DISEASE: Primary | ICD-10-CM

## 2020-03-16 PROCEDURE — 87631 RESP VIRUS 3-5 TARGETS: CPT | Performed by: PHYSICIAN ASSISTANT

## 2020-03-16 PROCEDURE — G0382 LEV 3 HOSP TYPE B ED VISIT: HCPCS | Performed by: PHYSICIAN ASSISTANT

## 2020-03-16 NOTE — PATIENT INSTRUCTIONS
Covid 19  And flu  Will Result will return in 3-5 days  We will call you until you the results  Prophylactic 14 day quarantine at home until results given  Drink lots of fluids  May use over the counter cold medications for symptomatic treatment  Do not use medications with Pseudoephedrine or Phenylphrine if you have high blood pressure because it may worsen your blood pressure  Follow up with your PCP in 3-5 days if your symptoms do not improve or if you have any concerns  Go to the ER if symptoms become severe  Influenza   WHAT YOU NEED TO KNOW:   Influenza (the flu) is an infection caused by the influenza virus  The flu is easily spread when an infected person coughs, sneezes, or has close contact with others  You may be able to spread the flu to others for 1 week or longer after signs or symptoms appear  DISCHARGE INSTRUCTIONS:   Call 911 for any of the following:   · You have trouble breathing, and your lips look purple or blue  · You have a seizure  Return to the emergency department if:   · You are dizzy, or you are urinating less or not at all  · You have a headache with a stiff neck, and you feel tired or confused  · You have new pain or pressure in your chest     · Your symptoms, such as shortness of breath, vomiting, or diarrhea, get worse  · Your symptoms, such as fever and coughing, seem to get better, but then get worse  Contact your healthcare provider if:   · You have new muscle pain or weakness  · You have questions or concerns about your condition or care  Medicines: You may need any of the following:  · Acetaminophen  decreases pain and fever  It is available without a doctor's order  Ask how much to take and how often to take it  Follow directions  Acetaminophen can cause liver damage if not taken correctly  · NSAIDs , such as ibuprofen, help decrease swelling, pain, and fever  This medicine is available with or without a doctor's order   NSAIDs can cause stomach bleeding or kidney problems in certain people  If you take blood thinner medicine, always ask your healthcare provider if NSAIDs are safe for you  Always read the medicine label and follow directions  · Antivirals  help fight a viral infection  · Take your medicine as directed  Contact your healthcare provider if you think your medicine is not helping or if you have side effects  Tell him or her if you are allergic to any medicine  Keep a list of the medicines, vitamins, and herbs you take  Include the amounts, and when and why you take them  Bring the list or the pill bottles to follow-up visits  Carry your medicine list with you in case of an emergency  Rest  as much as you can to help you recover  Drink liquids as directed  to help prevent dehydration  Ask how much liquid to drink each day and which liquids are best for you  Prevent the spread of influenza:   · Wash your hands often  Use soap and water  Wash your hands after you use the bathroom, change a child's diapers, or sneeze  Wash your hands before you prepare or eat food  Use gel hand cleanser when soap and water are not available  Do not touch your eyes, nose, or mouth unless you have washed your hands first            · Cover your mouth when you sneeze or cough  Cough into a tissue or the bend of your arm  · Clean shared items with a germ-killing   Clean table surfaces, doorknobs, and light switches  Do not share towels, silverware, and dishes with people who are sick  Wash bed sheets, towels, silverware, and dishes with soap and water  · Wear a mask  over your mouth and nose if you are sick or are near anyone who is sick  · Stay away from others  if you are sick  · Influenza vaccine  helps prevent influenza (flu)  Everyone older than 6 months should get a yearly influenza vaccine  Get the vaccine as soon as it is available, usually in September or October each year    Follow up with your healthcare provider as directed:  Write down your questions so you remember to ask them during your visits  © 2017 [address] [employer] Information is for End User's use only and may not be sold, redistributed or otherwise used for commercial purposes  All illustrations and images included in CareNotes® are the copyrighted property of A D A M [employer] Inc  or Michael Waller  The above information is an  only  It is not intended as medical advice for individual conditions or treatments  Talk to your doctor, nurse or pharmacist before following any medical regimen to see if it is safe and effective for you

## 2020-03-16 NOTE — CARE ANYWHERE EVISITS
Visit Summary for Dedra Sumner - Gender: Female - Date of Birth: [id number]  Date: [id number] - Duration: 6 minutes  Patient: Dedra Sumner  Provider: April Prather PA-C    Patient Contact Information  Address  [address]; [address]  [phone]    Visit Topics  Fever [Added By: Self - 2020-03-16]    Triage Questions   Have you had any international travel in the last 14 days? Answer []  Have you had any exposure to a known or expected Covid-19 patient in the last 14 days? Answer []  Do you have any immune system compromise or chronic lung disease? Answer   []  Do you have any vulnerable family members in the home (infant, pregnant, cancer, elderly)? Answer []     Conversation Transcripts  [0A][0A] [Notification] You are connected with April Prather PA-C, Urgent Care Specialist [0A][Notification] Radha Farris is located in [place]  [0A][Notification] Radha Farris has shared health history  Daniel Virk  [0A]    Diagnosis  Acute upper respiratory infection, unspecified    Procedures  Value: [id number] Code: CPT-4 UNLISTED E&M SERVICE    Medications Prescribed    No prescriptions ordered    Electronically signed by: Roger Norton(NPI [id number])

## 2020-03-16 NOTE — PROGRESS NOTES
Video Visit - [facility] 36 y o  female MRN: [id number]    Patient has a cough, fever of 102°  Patient works in the emergency room with numerous sick contacts  I cannot rule out corona virus at this time  Patient sent to urgent care for testing  I contacted [facility] and spoke with Dr Mariza Franks to coordinate the hand off  I called the patient and let them know to wait in the car and called the office, do not into the office without contacting them  She states she understands and agrees to this plan  REQUIRED DOCUMENTATION:         1  This service was provided via [employer]  2  Provider located at [address]  [phone]   3  [facility] provider: Christian Walker PA-C   4  Identify all parties in room with patient during [facility] visit:  Self  5  After connecting through [employer], patient was identified by name and date of birth  Patient was then informed that this was a Telemedicine visit and that the exam was being conducted confidentially over secure lines  My office door was closed  No one else was in the room  Patient acknowledged consent and understanding of privacy and security of the Telemedicine visit  I informed the patient that I have reviewed their record in Epic and presented the opportunity for them to ask any questions regarding the visit today  The patient agreed to participate  Pt denies any recent travel  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  Fever   This is a new problem  Episode onset: last night  The problem occurs constantly  The problem has been gradually worsening  Associated symptoms include chills, congestion, coughing, fatigue, a fever (Tmax 102 this morning), myalgias and swollen glands  Pertinent negatives include no abdominal pain, chest pain, diaphoresis, joint swelling, neck pain, numbness, sore throat, urinary symptoms, vomiting or weakness   Nothing aggravates the symptoms  She has tried acetaminophen for the symptoms  The treatment provided no relief  Patient had her flu shot this year  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 [employer]  Patient denies any recent travel  Patient is child currently has an upper respiratory infection  Patient is unsure she has had any exposure with a Coronavirus patient, a lot of people are sick in the ER currently  [place] Physical Exam   Constitutional: She appears well-developed and well-nourished  No distress  Patient appears well  Speaking full sentences  No shortness of breath  HENT:   Head: Normocephalic and atraumatic  Right Ear: External ear normal    Left Ear: External ear normal    Nose: Nose normal    No nasal congestion  Eyes: Conjunctivae are normal    Cardiovascular:   Unable to assess in the setting   Pulmonary/Chest: Effort normal  No respiratory distress  Occasional dry cough  Otherwise unable to assess in the setting   Lymphadenopathy:     She has no cervical adenopathy  Skin: No rash noted  She is not diaphoretic  No pallor  Diagnoses and all orders for this visit:    Acute respiratory disease      Patient Instructions   Go immediately to [facility] for influenza and corona virus testing  Follow up with PCP if not improved, if symptoms are worse, go to the ER

## 2020-03-16 NOTE — PROGRESS NOTES
[facility]        NAME: Noemí Vallejo is a 36 y o  female  : 1985    MRN: [id number]  DATE: 2020  TIME: 12:23 PM    Assessment and Plan   Flu-like symptoms [R68 89]  1  Flu-like symptoms  MISCELLANEOUS LAB TEST    Influenza A/B and RSV by PCR         Patient Instructions   Covid 19  And flu  Will Result will return in 3-5 days  We will call you until you the results  Prophylactic 14 day quarantine at home until results given  Drink lots of fluids  May use over the counter cold medications for symptomatic treatment  Do not use medications with Pseudoephedrine or Phenylphrine if you have high blood pressure because it may worsen your blood pressure  Follow up with your PCP in 3-5 days if your symptoms do not improve or if you have any concerns  Go to the ER if symptoms become severe  Follow up with PCP in 3-5 days  Proceed to  ER if symptoms worsen  Chief Complaint     Chief Complaint   Patient presents with    Cough         History of Present Illness       Patient is a 71-year-old female with significant past medical history of asthma who presents to office complaining of flu-like symptoms for 2 days  She reports fever of 102 1, cough, postnasal drip, congestion, sore throat which she has been treating with Tylenol with good results  Patient denies for headache, dizziness, shortness of breath, chest pain, palpitations, or abdominal complaints  Patient reports daughter was recently diagnosed with croup  Patient works as a tech across [employer] but denies any known contact with COVID 19 persons  Review of Systems   Review of Systems   Constitutional: Positive for chills and fever  HENT: Positive for postnasal drip, rhinorrhea and sore throat  Negative for congestion, ear pain, sinus pressure, sinus pain and sneezing  Eyes: Negative for itching  Respiratory: Positive for cough  Negative for shortness of breath      Cardiovascular: Negative for chest pain and palpitations  Gastrointestinal: Negative for abdominal pain, diarrhea, nausea and vomiting  Genitourinary: Negative for dysuria  Musculoskeletal: Negative for myalgias  Skin: Negative for rash  Neurological: Negative for dizziness, light-headedness and headaches           Current Medications       Current Outpatient Medications:     albuterol (2 5 mg/3 mL) 0 083 % nebulizer solution, Take 1 vial (2 5 mg total) by nebulization every 6 (six) hours as needed for wheezing or shortness of breath, Disp: 60 vial, Rfl: 5    albuterol (VENTOLIN HFA) 90 mcg/act inhaler, Inhale 2 puffs every 4 (four) hours as needed for wheezing or shortness of breath, Disp: 3 Inhaler, Rfl: 3    fluticasone-vilanterol (BREO ELLIPTA) 100-25 mcg/inh inhaler, Inhale 1 puff daily Rinse mouth after use , Disp: 3 Inhaler, Rfl: 3    Current Allergies     Allergies as of 03/16/2020 - Reviewed 03/16/2020   Allergen Reaction Noted    Sulfa antibiotics Other (See Comments) 08/23/2012            The following portions of the patient's history were reviewed and updated as appropriate: allergies, current medications, past family history, past medical history, past social history, past surgical history and problem list      Past Medical History:   Diagnosis Date    Asthma     had vaccine     Diabetes mellitus ([facility] [address] )     gestational DM diet controlled    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    only got  sperm tested, naturally conceived     Lumbar strain 7/16/2015    Varicella     chicken pox as child    Visual impairment     eyewear        Past Surgical History:   Procedure Laterality Date    FOOT SURGERY      WISDOM TOOTH EXTRACTION         Family History   Problem Relation Age of Onset    Multiple sclerosis Mother     Skin cancer Mother     Cancer Mother     Diabetes Father     Other Father         Pericarditis    Asthma Maternal Grandmother     Miscarriages / Stillbirths Sister     Diabetes Paternal Grandmother     Learning disabilities Other          Medications have been verified  Objective   /80   Pulse 90   Temp 99 8 °F (37 7 °C) (Tympanic)   Resp 16   Ht 5' 7" (1 702 m)   Wt 120 kg (265 lb)   LMP 03/09/2020   SpO2 98%   BMI 41 50 kg/m²        Physical Exam     Physical Exam   Constitutional: She appears well-developed and well-nourished  HENT:   Head: Normocephalic and atraumatic  Right Ear: Tympanic membrane, external ear and ear canal normal    Left Ear: Tympanic membrane, external ear and ear canal normal    Nose: Nose normal    Mouth/Throat: Uvula is midline, oropharynx is clear and moist and mucous membranes are normal    Eyes: Pupils are equal, round, and reactive to light  Conjunctivae and lids are normal    Neck: Neck supple  Cardiovascular: Normal rate, regular rhythm, normal heart sounds and normal pulses  Exam reveals no gallop and no friction rub  No murmur heard  Pulmonary/Chest: Effort normal and breath sounds normal  She has no wheezes  She has no rhonchi  She has no rales  She exhibits no tenderness  Abdominal: Normal appearance  Musculoskeletal: Normal range of motion  Lymphadenopathy:     She has no cervical adenopathy  Neurological: She is alert  Skin: Skin is warm and dry  Capillary refill takes less than 2 seconds  No rash noted  Psychiatric: She has a normal mood and affect  Nursing note and vitals reviewed  COVID 19 and flu swab sent

## 2020-03-17 ENCOUNTER — TELEPHONE (OUTPATIENT)
Dept: URGENT CARE | Facility: CLINIC | Age: 35
End: 2020-03-17

## 2020-03-17 LAB
FLUAV RNA NPH QL NAA+PROBE: DETECTED
FLUBV RNA NPH QL NAA+PROBE: ABNORMAL
RSV RNA NPH QL NAA+PROBE: ABNORMAL

## 2020-03-17 NOTE — TELEPHONE ENCOUNTER
Second attempt to call patient with results  Left message to call office for +flu and tamiflu prescription

## 2020-03-17 NOTE — TELEPHONE ENCOUNTER
Attempted to call patient with positive flu results  Patient did not answer and mailbox was full  Will attempt again later  Patient will be placed on Tamiflu    Waiting for COVID results

## 2020-03-18 ENCOUNTER — TELEPHONE (OUTPATIENT)
Dept: URGENT CARE | Age: 35
End: 2020-03-18

## 2020-03-18 LAB — SARS-COV-2 RNA SPEC QL NAA+PROBE: NOT DETECTED

## 2020-03-18 NOTE — TELEPHONE ENCOUNTER
Patient call about results  Flu positive COVID 19 negative  Patient educated that even though she is not now on official quarantine, that she should practice social dispensing  Patient also educated to wash hands often and not to touch face  Patient verbally understood

## 2020-03-29 NOTE — PROGRESS NOTES
Order(s) created erroneously   Erroneous order ID: [id number]   Order moved by: Grant Daigle   Order move date/time: 03/29/2020 9:08 AM   Source Patient: [id number]   Source Contact: 03/16/2020   Destination Patient: [id number]   Destination Contact: 03/16/2020

## 2020-05-28 ENCOUNTER — OFFICE VISIT (OUTPATIENT)
Dept: OBGYN CLINIC | Facility: CLINIC | Age: 35
End: 2020-05-28
Payer: COMMERCIAL

## 2020-05-28 VITALS
DIASTOLIC BLOOD PRESSURE: 74 MMHG | TEMPERATURE: 96.4 F | BODY MASS INDEX: 40.97 KG/M2 | WEIGHT: 261.6 LBS | SYSTOLIC BLOOD PRESSURE: 112 MMHG

## 2020-05-28 DIAGNOSIS — N92.4 EXCESSIVE BLEEDING IN PREMENOPAUSAL PERIOD: Primary | ICD-10-CM

## 2020-05-28 DIAGNOSIS — Z32.01 POSITIVE PREGNANCY TEST: ICD-10-CM

## 2020-05-28 PROCEDURE — 81025 URINE PREGNANCY TEST: CPT | Performed by: OBSTETRICS & GYNECOLOGY

## 2020-05-28 PROCEDURE — 1036F TOBACCO NON-USER: CPT | Performed by: OBSTETRICS & GYNECOLOGY

## 2020-05-28 PROCEDURE — 99214 OFFICE O/P EST MOD 30 MIN: CPT | Performed by: OBSTETRICS & GYNECOLOGY

## 2020-05-28 RX ORDER — MEDROXYPROGESTERONE ACETATE 10 MG/1
10 TABLET ORAL 2 TIMES DAILY
Qty: 7 TABLET | Refills: 0 | Status: SHIPPED | OUTPATIENT
Start: 2020-05-28 | End: 2020-05-28 | Stop reason: CLARIF

## 2020-05-29 ENCOUNTER — TELEPHONE (OUTPATIENT)
Dept: OBGYN CLINIC | Facility: CLINIC | Age: 35
End: 2020-05-29

## 2020-05-29 ENCOUNTER — APPOINTMENT (OUTPATIENT)
Dept: LAB | Facility: HOSPITAL | Age: 35
End: 2020-05-29
Attending: OBSTETRICS & GYNECOLOGY
Payer: COMMERCIAL

## 2020-05-29 ENCOUNTER — HOSPITAL ENCOUNTER (OUTPATIENT)
Dept: ULTRASOUND IMAGING | Facility: HOSPITAL | Age: 35
Discharge: HOME | End: 2020-05-29
Attending: OBSTETRICS & GYNECOLOGY
Payer: COMMERCIAL

## 2020-05-29 DIAGNOSIS — N92.4 EXCESSIVE BLEEDING IN PREMENOPAUSAL PERIOD: ICD-10-CM

## 2020-05-29 LAB
B-HCG SERPL-ACNC: 30.4 MIU/ML
SL AMB POCT URINE HCG: POSITIVE

## 2020-05-29 PROCEDURE — 76856 US EXAM PELVIC COMPLETE: CPT

## 2020-05-29 PROCEDURE — 84702 CHORIONIC GONADOTROPIN TEST: CPT

## 2020-05-29 PROCEDURE — 36415 COLL VENOUS BLD VENIPUNCTURE: CPT

## 2020-05-29 PROCEDURE — 76830 TRANSVAGINAL US NON-OB: CPT

## 2020-06-02 ENCOUNTER — APPOINTMENT (OUTPATIENT)
Dept: LAB | Facility: HOSPITAL | Age: 35
End: 2020-06-02
Payer: COMMERCIAL

## 2020-06-02 ENCOUNTER — TELEPHONE (OUTPATIENT)
Dept: OBGYN CLINIC | Facility: CLINIC | Age: 35
End: 2020-06-02

## 2020-06-02 DIAGNOSIS — Z32.01 POSITIVE PREGNANCY TEST: ICD-10-CM

## 2020-06-02 DIAGNOSIS — Z86.32 HISTORY OF GESTATIONAL DIABETES: ICD-10-CM

## 2020-06-02 DIAGNOSIS — E78.2 ELEVATED TRIGLYCERIDES WITH HIGH CHOLESTEROL: ICD-10-CM

## 2020-06-02 LAB
B-HCG SERPL-ACNC: 42 MIU/ML
CHOLEST SERPL-MCNC: 150 MG/DL (ref 50–200)
EST. AVERAGE GLUCOSE BLD GHB EST-MCNC: 117 MG/DL
HBA1C MFR BLD: 5.7 %
HDLC SERPL-MCNC: 43 MG/DL
LDLC SERPL CALC-MCNC: 84 MG/DL (ref 0–100)
NONHDLC SERPL-MCNC: 107 MG/DL
TRIGL SERPL-MCNC: 113 MG/DL

## 2020-06-02 PROCEDURE — 80061 LIPID PANEL: CPT

## 2020-06-02 PROCEDURE — 83036 HEMOGLOBIN GLYCOSYLATED A1C: CPT

## 2020-06-02 PROCEDURE — 84702 CHORIONIC GONADOTROPIN TEST: CPT

## 2020-06-02 PROCEDURE — 36415 COLL VENOUS BLD VENIPUNCTURE: CPT

## 2020-06-03 ENCOUNTER — TELEPHONE (OUTPATIENT)
Dept: OBGYN CLINIC | Facility: CLINIC | Age: 35
End: 2020-06-03

## 2020-06-03 DIAGNOSIS — N93.9 ABNORMAL UTERINE BLEEDING: Primary | ICD-10-CM

## 2020-06-04 ENCOUNTER — APPOINTMENT (OUTPATIENT)
Dept: LAB | Facility: HOSPITAL | Age: 35
End: 2020-06-04
Payer: COMMERCIAL

## 2020-06-04 ENCOUNTER — TELEPHONE (OUTPATIENT)
Dept: LABOR AND DELIVERY | Facility: HOSPITAL | Age: 35
End: 2020-06-04

## 2020-06-04 ENCOUNTER — TELEPHONE (OUTPATIENT)
Dept: OBGYN CLINIC | Facility: CLINIC | Age: 35
End: 2020-06-04

## 2020-06-04 ENCOUNTER — HOSPITAL ENCOUNTER (EMERGENCY)
Facility: HOSPITAL | Age: 35
Discharge: HOME | End: 2020-06-05
Attending: EMERGENCY MEDICINE
Payer: COMMERCIAL

## 2020-06-04 DIAGNOSIS — N93.9 ABNORMAL UTERINE BLEEDING: ICD-10-CM

## 2020-06-04 DIAGNOSIS — O00.90 ECTOPIC PREGNANCY WITHOUT INTRAUTERINE PREGNANCY, UNSPECIFIED LOCATION: Primary | ICD-10-CM

## 2020-06-04 DIAGNOSIS — O00.90 ECTOPIC PREGNANCY: Primary | ICD-10-CM

## 2020-06-04 LAB
ALBUMIN SERPL BCP-MCNC: 3.5 G/DL (ref 3.5–5)
ALP SERPL-CCNC: 67 U/L (ref 46–116)
ALT SERPL W P-5'-P-CCNC: 26 U/L (ref 12–78)
ANION GAP SERPL CALCULATED.3IONS-SCNC: 10 MMOL/L (ref 4–13)
AST SERPL W P-5'-P-CCNC: 24 U/L (ref 5–45)
B-HCG SERPL-ACNC: 39.7 MIU/ML
BASOPHILS # BLD AUTO: 0.03 THOUSANDS/ΜL (ref 0–0.1)
BASOPHILS NFR BLD AUTO: 0 % (ref 0–1)
BILIRUB SERPL-MCNC: 0.39 MG/DL (ref 0.2–1)
BUN SERPL-MCNC: 14 MG/DL (ref 5–25)
CALCIUM SERPL-MCNC: 8.4 MG/DL (ref 8.3–10.1)
CHLORIDE SERPL-SCNC: 105 MMOL/L (ref 100–108)
CO2 SERPL-SCNC: 24 MMOL/L (ref 21–32)
CREAT SERPL-MCNC: 0.78 MG/DL (ref 0.6–1.3)
EOSINOPHIL # BLD AUTO: 0.22 THOUSAND/ΜL (ref 0–0.61)
EOSINOPHIL NFR BLD AUTO: 3 % (ref 0–6)
ERYTHROCYTE [DISTWIDTH] IN BLOOD BY AUTOMATED COUNT: 12.6 % (ref 11.6–15.1)
GFR SERPL CREATININE-BSD FRML MDRD: 99 ML/MIN/1.73SQ M
GLUCOSE SERPL-MCNC: 120 MG/DL (ref 65–140)
HCT VFR BLD AUTO: 35 % (ref 34.8–46.1)
HGB BLD-MCNC: 11.5 G/DL (ref 11.5–15.4)
IMM GRANULOCYTES # BLD AUTO: 0.03 THOUSAND/UL (ref 0–0.2)
IMM GRANULOCYTES NFR BLD AUTO: 0 % (ref 0–2)
LYMPHOCYTES # BLD AUTO: 1.78 THOUSANDS/ΜL (ref 0.6–4.47)
LYMPHOCYTES NFR BLD AUTO: 21 % (ref 14–44)
MCH RBC QN AUTO: 29.4 PG (ref 26.8–34.3)
MCHC RBC AUTO-ENTMCNC: 32.9 G/DL (ref 31.4–37.4)
MCV RBC AUTO: 90 FL (ref 82–98)
MONOCYTES # BLD AUTO: 0.64 THOUSAND/ΜL (ref 0.17–1.22)
MONOCYTES NFR BLD AUTO: 8 % (ref 4–12)
NEUTROPHILS # BLD AUTO: 5.84 THOUSANDS/ΜL (ref 1.85–7.62)
NEUTS SEG NFR BLD AUTO: 68 % (ref 43–75)
NRBC BLD AUTO-RTO: 0 /100 WBCS
PLATELET # BLD AUTO: 242 THOUSANDS/UL (ref 149–390)
PMV BLD AUTO: 9.8 FL (ref 8.9–12.7)
POTASSIUM SERPL-SCNC: 3.7 MMOL/L (ref 3.5–5.3)
PROT SERPL-MCNC: 7 G/DL (ref 6.4–8.2)
RBC # BLD AUTO: 3.91 MILLION/UL (ref 3.81–5.12)
SODIUM SERPL-SCNC: 139 MMOL/L (ref 136–145)
WBC # BLD AUTO: 8.54 THOUSAND/UL (ref 4.31–10.16)

## 2020-06-04 PROCEDURE — 84702 CHORIONIC GONADOTROPIN TEST: CPT

## 2020-06-04 PROCEDURE — 36415 COLL VENOUS BLD VENIPUNCTURE: CPT

## 2020-06-04 PROCEDURE — 99284 EMERGENCY DEPT VISIT MOD MDM: CPT | Performed by: EMERGENCY MEDICINE

## 2020-06-04 PROCEDURE — 99284 EMERGENCY DEPT VISIT MOD MDM: CPT

## 2020-06-04 PROCEDURE — 93005 ELECTROCARDIOGRAM TRACING: CPT

## 2020-06-04 PROCEDURE — 85025 COMPLETE CBC W/AUTO DIFF WBC: CPT | Performed by: STUDENT IN AN ORGANIZED HEALTH CARE EDUCATION/TRAINING PROGRAM

## 2020-06-04 PROCEDURE — 80053 COMPREHEN METABOLIC PANEL: CPT | Performed by: STUDENT IN AN ORGANIZED HEALTH CARE EDUCATION/TRAINING PROGRAM

## 2020-06-04 RX ORDER — METHOTREXATE 25 MG/ML
50 INJECTION INTRA-ARTERIAL; INTRAMUSCULAR; INTRATHECAL; INTRAVENOUS ONCE
Status: COMPLETED | OUTPATIENT
Start: 2020-06-04 | End: 2020-06-05

## 2020-06-05 VITALS
RESPIRATION RATE: 18 BRPM | TEMPERATURE: 98.5 F | SYSTOLIC BLOOD PRESSURE: 135 MMHG | DIASTOLIC BLOOD PRESSURE: 80 MMHG | OXYGEN SATURATION: 98 % | HEART RATE: 80 BPM | BODY MASS INDEX: 41.95 KG/M2 | WEIGHT: 267.86 LBS

## 2020-06-05 LAB
ATRIAL RATE: 82 BPM
P AXIS: 46 DEGREES
PR INTERVAL: 128 MS
QRS AXIS: 47 DEGREES
QRSD INTERVAL: 94 MS
QT INTERVAL: 382 MS
QTC INTERVAL: 424 MS
T WAVE AXIS: 37 DEGREES
VENTRICULAR RATE: 74 BPM

## 2020-06-05 PROCEDURE — 96372 THER/PROPH/DIAG INJ SC/IM: CPT

## 2020-06-05 PROCEDURE — 99243 OFF/OP CNSLTJ NEW/EST LOW 30: CPT | Performed by: STUDENT IN AN ORGANIZED HEALTH CARE EDUCATION/TRAINING PROGRAM

## 2020-06-05 PROCEDURE — 93010 ELECTROCARDIOGRAM REPORT: CPT | Performed by: INTERNAL MEDICINE

## 2020-06-05 RX ADMIN — METHOTREXATE 114.5 MG: 25 SOLUTION INTRA-ARTERIAL; INTRAMUSCULAR; INTRATHECAL; INTRAVENOUS at 00:36

## 2020-06-08 ENCOUNTER — APPOINTMENT (OUTPATIENT)
Dept: LAB | Facility: HOSPITAL | Age: 35
End: 2020-06-08
Attending: STUDENT IN AN ORGANIZED HEALTH CARE EDUCATION/TRAINING PROGRAM
Payer: COMMERCIAL

## 2020-06-08 DIAGNOSIS — O00.90 ECTOPIC PREGNANCY WITHOUT INTRAUTERINE PREGNANCY, UNSPECIFIED LOCATION: ICD-10-CM

## 2020-06-08 LAB — B-HCG SERPL-ACNC: 21.7 MIU/ML

## 2020-06-08 PROCEDURE — 36415 COLL VENOUS BLD VENIPUNCTURE: CPT

## 2020-06-08 PROCEDURE — 84702 CHORIONIC GONADOTROPIN TEST: CPT

## 2020-06-11 ENCOUNTER — TELEPHONE (OUTPATIENT)
Dept: OBGYN CLINIC | Facility: CLINIC | Age: 35
End: 2020-06-11

## 2020-06-11 ENCOUNTER — APPOINTMENT (OUTPATIENT)
Dept: LAB | Facility: HOSPITAL | Age: 35
End: 2020-06-11
Attending: STUDENT IN AN ORGANIZED HEALTH CARE EDUCATION/TRAINING PROGRAM
Payer: COMMERCIAL

## 2020-06-11 ENCOUNTER — OFFICE VISIT (OUTPATIENT)
Dept: OBGYN CLINIC | Facility: CLINIC | Age: 35
End: 2020-06-11
Payer: COMMERCIAL

## 2020-06-11 VITALS
SYSTOLIC BLOOD PRESSURE: 112 MMHG | TEMPERATURE: 96.5 F | DIASTOLIC BLOOD PRESSURE: 78 MMHG | BODY MASS INDEX: 40.5 KG/M2 | WEIGHT: 258.6 LBS

## 2020-06-11 DIAGNOSIS — O00.90 ECTOPIC PREGNANCY WITHOUT INTRAUTERINE PREGNANCY, UNSPECIFIED LOCATION: ICD-10-CM

## 2020-06-11 DIAGNOSIS — O00.90 ECTOPIC PREGNANCY WITHOUT INTRAUTERINE PREGNANCY, UNSPECIFIED LOCATION: Primary | ICD-10-CM

## 2020-06-11 DIAGNOSIS — Z30.011 ENCOUNTER FOR INITIAL PRESCRIPTION OF CONTRACEPTIVE PILLS: ICD-10-CM

## 2020-06-11 LAB — B-HCG SERPL-ACNC: 12 MIU/ML

## 2020-06-11 PROCEDURE — 1036F TOBACCO NON-USER: CPT | Performed by: OBSTETRICS & GYNECOLOGY

## 2020-06-11 PROCEDURE — 36415 COLL VENOUS BLD VENIPUNCTURE: CPT

## 2020-06-11 PROCEDURE — 99213 OFFICE O/P EST LOW 20 MIN: CPT | Performed by: OBSTETRICS & GYNECOLOGY

## 2020-06-11 PROCEDURE — 84702 CHORIONIC GONADOTROPIN TEST: CPT

## 2020-06-11 RX ORDER — NORETHINDRONE ACETATE AND ETHINYL ESTRADIOL 1; .02 MG/1; MG/1
1 TABLET ORAL DAILY
Qty: 84 TABLET | Refills: 3 | Status: SHIPPED | OUTPATIENT
Start: 2020-06-11 | End: 2021-12-09

## 2020-06-19 ENCOUNTER — APPOINTMENT (OUTPATIENT)
Dept: LAB | Facility: HOSPITAL | Age: 35
End: 2020-06-19
Attending: OBSTETRICS & GYNECOLOGY
Payer: COMMERCIAL

## 2020-06-19 DIAGNOSIS — O00.90 ECTOPIC PREGNANCY WITHOUT INTRAUTERINE PREGNANCY, UNSPECIFIED LOCATION: ICD-10-CM

## 2020-06-19 LAB — B-HCG SERPL-ACNC: <2 MIU/ML

## 2020-06-19 PROCEDURE — 36415 COLL VENOUS BLD VENIPUNCTURE: CPT

## 2020-06-19 PROCEDURE — 84702 CHORIONIC GONADOTROPIN TEST: CPT

## 2020-06-22 ENCOUNTER — TELEPHONE (OUTPATIENT)
Dept: OBGYN CLINIC | Facility: CLINIC | Age: 35
End: 2020-06-22

## 2020-09-24 ENCOUNTER — ANNUAL EXAM (OUTPATIENT)
Dept: OBGYN CLINIC | Facility: CLINIC | Age: 35
End: 2020-09-24
Payer: COMMERCIAL

## 2020-09-24 VITALS
TEMPERATURE: 95.6 F | WEIGHT: 257.4 LBS | HEIGHT: 67 IN | BODY MASS INDEX: 40.4 KG/M2 | SYSTOLIC BLOOD PRESSURE: 122 MMHG | DIASTOLIC BLOOD PRESSURE: 82 MMHG

## 2020-09-24 DIAGNOSIS — Z11.51 SCREENING FOR HUMAN PAPILLOMAVIRUS (HPV): ICD-10-CM

## 2020-09-24 DIAGNOSIS — Z01.419 ENCOUNTER FOR GYNECOLOGICAL EXAMINATION (GENERAL) (ROUTINE) WITHOUT ABNORMAL FINDINGS: Primary | ICD-10-CM

## 2020-09-24 PROCEDURE — 99395 PREV VISIT EST AGE 18-39: CPT | Performed by: OBSTETRICS & GYNECOLOGY

## 2020-09-24 PROCEDURE — G0145 SCR C/V CYTO,THINLAYER,RESCR: HCPCS | Performed by: OBSTETRICS & GYNECOLOGY

## 2020-09-24 PROCEDURE — 87624 HPV HI-RISK TYP POOLED RSLT: CPT | Performed by: OBSTETRICS & GYNECOLOGY

## 2020-09-25 NOTE — PROGRESS NOTES
Assessment/Plan:    1  Encounter for gynecological examination (general) (routine) without abnormal findings    - Liquid-based pap, screening    2  Screening for human papillomavirus (HPV)    - Liquid-based pap, screening    Plan for quant/prog once patient has positive urine hCG    Silvestre Pang is a 30 y o  female who presents for annual exam  Periods are regular every 28-30 days, lasting 3 days  Dysmenorrhea:none  Cyclic symptoms include none  No intermenstrual bleeding, spotting, or discharge  The patient denies any urinary or sexual issues  She plans to try and conceive around 2020  Current contraception: OCP (estrogen/progesterone)  History of abnormal Pap smear: no  Regular self breast exam: yes  History of abnormal mammogram: no  History of abnormal lipids: no  Menstrual History:  OB History        2    Para   1    Term   1       0    AB   1    Living   1       SAB   0    TAB   0    Ectopic   1    Multiple   0    Live Births   1           Obstetric Comments   GDM-diet control, asthma            Patient's last menstrual period was 2020 (exact date)  Period Cycle (Days): 28  Period Duration (Days): 3-4  Period Pattern: Regular  Menstrual Flow: Light  Dysmenorrhea: None  Dysmenorrhea Symptoms: Headache(Week not on the pill gets headaches)    The following portions of the patient's history were reviewed and updated as appropriate: allergies, current medications, past family history, past medical history, past social history, past surgical history and problem list     Review of Systems  Pertinent items are noted in HPI        Objective      /82 (BP Location: Left arm, Patient Position: Sitting, Cuff Size: Large)   Temp (!) 95 6 °F (35 3 °C) (Tympanic)   Ht 5' 7 25" (1 708 m)   Wt 117 kg (257 lb 6 4 oz)   LMP 2020 (Exact Date)   BMI 40 02 kg/m²     General:   alert and oriented, in no acute distress   Heart:    Breasts: regular rate and rhythm, S1, S2 normal, no murmur, click, rub or gallop   appear normal, no suspicious masses, no skin or nipple changes or axillary nodes     Lungs: clear to auscultation bilaterally   Abdomen: soft, non-tender, without masses or organomegaly   Vulva: normal   Vagina: normal mucosa   Cervix: no lesions   Uterus: normal size, mobile, non-tender   Adnexa: normal adnexa and no mass, fullness, tenderness

## 2020-10-05 ENCOUNTER — TELEPHONE (OUTPATIENT)
Dept: OBGYN CLINIC | Facility: CLINIC | Age: 35
End: 2020-10-05

## 2020-10-05 LAB
HPV HR 12 DNA CVX QL NAA+PROBE: NEGATIVE
HPV16 DNA CVX QL NAA+PROBE: NEGATIVE
HPV18 DNA CVX QL NAA+PROBE: NEGATIVE
LAB AP GYN PRIMARY INTERPRETATION: NORMAL
LAB AP LMP: NORMAL
Lab: NORMAL

## 2020-10-27 ENCOUNTER — OFFICE VISIT (OUTPATIENT)
Dept: URGENT CARE | Facility: MEDICAL CENTER | Age: 35
End: 2020-10-27
Payer: COMMERCIAL

## 2020-10-27 VITALS — BODY MASS INDEX: 40.02 KG/M2 | WEIGHT: 255 LBS | HEIGHT: 67 IN

## 2020-10-27 DIAGNOSIS — Z11.59 SPECIAL SCREENING EXAMINATION FOR UNSPECIFIED VIRAL DISEASE: Primary | ICD-10-CM

## 2020-10-27 DIAGNOSIS — G43.909 MIGRAINE WITHOUT STATUS MIGRAINOSUS, NOT INTRACTABLE, UNSPECIFIED MIGRAINE TYPE: ICD-10-CM

## 2020-10-27 PROCEDURE — U0003 INFECTIOUS AGENT DETECTION BY NUCLEIC ACID (DNA OR RNA); SEVERE ACUTE RESPIRATORY SYNDROME CORONAVIRUS 2 (SARS-COV-2) (CORONAVIRUS DISEASE [COVID-19]), AMPLIFIED PROBE TECHNIQUE, MAKING USE OF HIGH THROUGHPUT TECHNOLOGIES AS DESCRIBED BY CMS-2020-01-R: HCPCS | Performed by: FAMILY MEDICINE

## 2020-10-27 PROCEDURE — 96372 THER/PROPH/DIAG INJ SC/IM: CPT | Performed by: FAMILY MEDICINE

## 2020-10-27 PROCEDURE — G0383 LEV 4 HOSP TYPE B ED VISIT: HCPCS | Performed by: FAMILY MEDICINE

## 2020-10-27 RX ORDER — KETOROLAC TROMETHAMINE 30 MG/ML
60 INJECTION, SOLUTION INTRAMUSCULAR; INTRAVENOUS ONCE
Status: COMPLETED | OUTPATIENT
Start: 2020-10-27 | End: 2020-10-27

## 2020-10-27 RX ORDER — ONDANSETRON 4 MG/1
4 TABLET, ORALLY DISINTEGRATING ORAL ONCE
Status: COMPLETED | OUTPATIENT
Start: 2020-10-27 | End: 2020-10-27

## 2020-10-27 RX ORDER — SUMATRIPTAN 50 MG/1
50 TABLET, FILM COATED ORAL ONCE AS NEEDED
Qty: 9 TABLET | Refills: 0 | Status: SHIPPED | OUTPATIENT
Start: 2020-10-27

## 2020-10-27 RX ORDER — ONDANSETRON 4 MG/1
4 TABLET, ORALLY DISINTEGRATING ORAL EVERY 6 HOURS PRN
Qty: 20 TABLET | Refills: 0 | Status: SHIPPED | OUTPATIENT
Start: 2020-10-27 | End: 2021-12-09

## 2020-10-27 RX ADMIN — KETOROLAC TROMETHAMINE 60 MG: 30 INJECTION, SOLUTION INTRAMUSCULAR; INTRAVENOUS at 11:40

## 2020-10-27 RX ADMIN — ONDANSETRON 4 MG: 4 TABLET, ORALLY DISINTEGRATING ORAL at 11:42

## 2020-10-28 LAB — SARS-COV-2 RNA SPEC QL NAA+PROBE: NOT DETECTED

## 2020-11-19 ENCOUNTER — TELEPHONE (OUTPATIENT)
Dept: PULMONOLOGY | Facility: CLINIC | Age: 35
End: 2020-11-19

## 2020-12-22 ENCOUNTER — TELEPHONE (OUTPATIENT)
Dept: OBGYN CLINIC | Facility: CLINIC | Age: 35
End: 2020-12-22

## 2021-02-19 ENCOUNTER — IMMUNIZATIONS (OUTPATIENT)
Dept: FAMILY MEDICINE CLINIC | Facility: HOSPITAL | Age: 36
End: 2021-02-19

## 2021-02-19 DIAGNOSIS — Z23 ENCOUNTER FOR IMMUNIZATION: Primary | ICD-10-CM

## 2021-02-19 PROCEDURE — 0001A SARS-COV-2 / COVID-19 MRNA VACCINE (PFIZER-BIONTECH) 30 MCG: CPT

## 2021-02-19 PROCEDURE — 91300 SARS-COV-2 / COVID-19 MRNA VACCINE (PFIZER-BIONTECH) 30 MCG: CPT

## 2021-03-10 ENCOUNTER — IMMUNIZATIONS (OUTPATIENT)
Dept: FAMILY MEDICINE CLINIC | Facility: HOSPITAL | Age: 36
End: 2021-03-10

## 2021-03-10 DIAGNOSIS — Z23 ENCOUNTER FOR IMMUNIZATION: Primary | ICD-10-CM

## 2021-03-10 PROCEDURE — 0002A SARS-COV-2 / COVID-19 MRNA VACCINE (PFIZER-BIONTECH) 30 MCG: CPT

## 2021-03-10 PROCEDURE — 91300 SARS-COV-2 / COVID-19 MRNA VACCINE (PFIZER-BIONTECH) 30 MCG: CPT

## 2021-04-18 ENCOUNTER — NURSE TRIAGE (OUTPATIENT)
Dept: OTHER | Facility: OTHER | Age: 36
End: 2021-04-18

## 2021-04-18 DIAGNOSIS — Z20.828 SARS-ASSOCIATED CORONAVIRUS EXPOSURE: Primary | ICD-10-CM

## 2021-04-18 DIAGNOSIS — Z20.828 SARS-ASSOCIATED CORONAVIRUS EXPOSURE: ICD-10-CM

## 2021-04-18 PROCEDURE — U0003 INFECTIOUS AGENT DETECTION BY NUCLEIC ACID (DNA OR RNA); SEVERE ACUTE RESPIRATORY SYNDROME CORONAVIRUS 2 (SARS-COV-2) (CORONAVIRUS DISEASE [COVID-19]), AMPLIFIED PROBE TECHNIQUE, MAKING USE OF HIGH THROUGHPUT TECHNOLOGIES AS DESCRIBED BY CMS-2020-01-R: HCPCS | Performed by: FAMILY MEDICINE

## 2021-04-18 PROCEDURE — U0005 INFEC AGEN DETEC AMPLI PROBE: HCPCS | Performed by: FAMILY MEDICINE

## 2021-04-18 NOTE — TELEPHONE ENCOUNTER
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

## 2021-04-18 NOTE — TELEPHONE ENCOUNTER
Reason for Disposition   [1] COVID-19 infection suspected by caller or triager AND [2] mild symptoms (cough, fever, or others) AND [9] no complications or SOB    Answer Assessment - Initial Assessment Questions  1  Were you within 6 feet or less, for up to 15 minutes or more with a person that has a confirmed COVID-19 test?   Yes    2  What was the date of your exposure? One week ago  3  Are you experiencing any symptoms attributed to the virus?  (Assess for SOB, cough, fever, difficulty breathing)   Cough, headache, sore throat, and congestion  4  HIGH RISK: Do you have any history heart or lung conditions, weakened immune system, diabetes, Asthma, CHF, HIV, COPD, Chemo, renal failure, sickle cell, etc?   Asthma    5  PREGNANCY: Are you pregnant or did you recently give birth?        No    Protocols used: CORONAVIRUS (COVID-19) DIAGNOSED OR SUSPECTED-ADULT-

## 2021-04-19 LAB — SARS-COV-2 RNA RESP QL NAA+PROBE: NEGATIVE

## 2021-06-17 ENCOUNTER — APPOINTMENT (OUTPATIENT)
Dept: RADIOLOGY | Facility: CLINIC | Age: 36
End: 2021-06-17
Payer: COMMERCIAL

## 2021-06-17 ENCOUNTER — OFFICE VISIT (OUTPATIENT)
Dept: URGENT CARE | Facility: CLINIC | Age: 36
End: 2021-06-17
Payer: COMMERCIAL

## 2021-06-17 VITALS
WEIGHT: 250 LBS | DIASTOLIC BLOOD PRESSURE: 82 MMHG | OXYGEN SATURATION: 97 % | RESPIRATION RATE: 20 BRPM | BODY MASS INDEX: 39.24 KG/M2 | HEART RATE: 90 BPM | SYSTOLIC BLOOD PRESSURE: 136 MMHG | TEMPERATURE: 98.2 F | HEIGHT: 67 IN

## 2021-06-17 DIAGNOSIS — S99.912A INJURY OF LEFT ANKLE, INITIAL ENCOUNTER: ICD-10-CM

## 2021-06-17 DIAGNOSIS — S93.602A FOOT SPRAIN, LEFT, INITIAL ENCOUNTER: Primary | ICD-10-CM

## 2021-06-17 PROCEDURE — 73630 X-RAY EXAM OF FOOT: CPT

## 2021-06-17 PROCEDURE — 73610 X-RAY EXAM OF ANKLE: CPT

## 2021-06-17 PROCEDURE — G0382 LEV 3 HOSP TYPE B ED VISIT: HCPCS | Performed by: FAMILY MEDICINE

## 2021-06-17 NOTE — PATIENT INSTRUCTIONS
Ibuprofen 3 times a day  Elevate with ice for 20-30 minutes 3 to 4 times a day  If pain persists through this weekend, even on his follow-up with orthopedics or podiatry  Follow up with PCP in 3-5 days  Proceed to  ER if symptoms worsen  Foot Sprain   WHAT YOU NEED TO KNOW:   A foot sprain is a stretched or torn ligament in the foot or toe  Ligaments are tough tissues that connect bones  DISCHARGE INSTRUCTIONS:   Return to the emergency department if:   · You have numbness or tingling below the injury, such as in your toes  · The skin on your injured foot is blue or pale  · You have increased pain, even after you take pain medicine  Call your doctor if:   · You have new weakness in your foot  · You have new or increased swelling in your foot  · You have new or increased stiffness when you move your injured foot  · You have questions or concerns about your condition or care  Medicines:   · NSAIDs , such as ibuprofen, help decrease swelling, pain, and fever  This medicine is available with or without a doctor's order  NSAIDs can cause stomach bleeding or kidney problems in certain people  If you take blood thinner medicine, always ask if NSAIDs are safe for you  Always read the medicine label and follow directions  Do not give these medicines to children under 10months of age without direction from your child's healthcare provider  · Take your medicine as directed  Contact your healthcare provider if you think your medicine is not helping or if you have side effects  Tell him of her if you are allergic to any medicine  Keep a list of the medicines, vitamins, and herbs you take  Include the amounts, and when and why you take them  Bring the list or the pill bottles to follow-up visits  Carry your medicine list with you in case of an emergency  Self-care:   · Rest your foot  Limit movement in your sprained foot for the first 2 to 3 days   You might need crutches to take weight off your injured foot as it heals  Use crutches as directed  · Apply ice  on your foot for 15 to 20 minutes every hour or as directed  Use an ice pack, or put crushed ice in a plastic bag  Cover it with a towel  Ice helps prevent tissue damage and decreases swelling and pain  · Compress your foot  You may need to use tape or an elastic bandage to support your foot if you have a mild sprain  You may need a splint on your foot for support if your sprain is severe  Wear your splint for as many days as directed  · Elevate your foot  above the level of your heart as often as you can  This will help decrease swelling and pain  Prop your foot on pillows or blankets to keep it elevated comfortably  Exercise your foot:  You may be given exercises to improve your strength and to help decrease stiffness  The exercises and physical therapy can help restore strength and increase the range of motion in your foot  Ask your healthcare provider when you can return to your normal activities or play sports  Prevent another foot sprain:   · Warm up and stretch before you exercise  · Do not exercise when you feel pain or are tired  · Wear equipment to protect yourself when you play sports  Follow up with your doctor as directed:  Write down your questions so you remember to ask them during your visits  © Copyright [employer] Information is for End User's use only and may not be sold, redistributed or otherwise used for commercial purposes  All illustrations and images included in CareNotes® are the copyrighted property of A D A M , Inc  or [facility] [employer]   The above information is an  only  It is not intended as medical advice for individual conditions or treatments  Talk to your doctor, nurse or pharmacist before following any medical regimen to see if it is safe and effective for you

## 2021-06-17 NOTE — PROGRESS NOTES
[facility]        NAME: Tim Jimenez is a 29 y o  female  : 1985    MRN: [id number]  DATE: 2021  TIME: 9:46 AM    Assessment and Plan   Foot sprain, left, initial encounter [Z93 602A]  1  Foot sprain, left, initial encounter  XR ankle 3+ vw left    XR foot 3+ vw left         Patient Instructions       Ibuprofen 3 times a day  Elevate with ice for 20-30 minutes 3 to 4 times a day  If pain persists through this weekend, even on his follow-up with orthopedics or podiatry  Follow up with PCP in 3-5 days  Proceed to  ER if symptoms worsen  Chief Complaint     Chief Complaint   Patient presents with    Ankle Pain     left ankle, started on saturday, was camping and rolled ankle slipping in mud, pt is able to ambulate, with discomfort, pain on lateral aspect of foot, canot turn towards left side, can flex toes with no pain  slighlty swollen, no bruising, "lump" on side of ankle, ice and tylenol         History of Present Illness       Ankle Pain (left ankle, started on saturday, was camping and rolled ankle slipping in mud, pt is able to ambulate, with discomfort, pain on lateral aspect of foot, canot turn towards left side, can flex toes with no pain  slighlty swollen, no bruising, "lump" on side of ankle, ice and tylenol)      Ankle Pain   The incident occurred 3 to 5 days ago  The incident occurred in the yard  The injury mechanism was a twisting injury  The pain is present in the left ankle and left foot  The pain is moderate  The pain has been improving since onset  She has tried acetaminophen and NSAIDs for the symptoms  The treatment provided mild relief  Review of Systems   Review of Systems   Constitutional: Negative  Respiratory: Negative  Cardiovascular: Negative      Musculoskeletal:        Left foot pain         Current Medications       Current Outpatient Medications:     albuterol (2 5 mg/3 mL) 0 083 % nebulizer solution, Take 1 vial (2 5 mg total) by nebulization every 6 (six) hours as needed for wheezing or shortness of breath, Disp: 60 vial, Rfl: 5    albuterol (VENTOLIN HFA) 90 mcg/act inhaler, Inhale 2 puffs every 4 (four) hours as needed for wheezing or shortness of breath, Disp: 3 Inhaler, Rfl: 3    fluticasone-vilanterol (BREO ELLIPTA) 100-25 mcg/inh inhaler, Inhale 1 puff daily Rinse mouth after use , Disp: 3 Inhaler, Rfl: 3    SUMAtriptan (IMITREX) 50 mg tablet, Take 1 tablet (50 mg total) by mouth once as needed for migraine for up to 1 dose, Disp: 9 tablet, Rfl: 0    norethindrone-ethinyl estradiol (MICROGESTIN 1/20) 1-20 MG-MCG per tablet, Take 1 tablet by mouth daily (Patient not taking: Reported on 6/17/2021), Disp: 84 tablet, Rfl: 3    ondansetron (ZOFRAN-ODT) 4 mg disintegrating tablet, Take 1 tablet (4 mg total) by mouth every 6 (six) hours as needed for nausea or vomiting (Patient not taking: Reported on 6/17/2021), Disp: 20 tablet, Rfl: 0    Current Allergies     Allergies as of 06/17/2021 - Reviewed 06/17/2021   Allergen Reaction Noted    Sulfa antibiotics Other (See Comments) 08/23/2012            The following portions of the patient's history were reviewed and updated as appropriate: allergies, current medications, past family history, past medical history, past social history, past surgical history and problem list      Past Medical History:   Diagnosis Date    Asthma     had vaccine     Diabetes mellitus ([facility] [address] )     gestational DM diet controlled    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    only got  sperm tested, naturally conceived     Lumbar strain 7/16/2015    Varicella     chicken pox as child    Visual impairment     eyewear        Past Surgical History:   Procedure Laterality Date    FOOT SURGERY      WISDOM TOOTH EXTRACTION         Family History   Problem Relation Age of Onset    Multiple sclerosis Mother     Skin cancer Mother     Cancer Mother     Diabetes Father     Other Father         Pericarditis    Asthma Maternal Grandmother     Miscarriages / Stillbirths Sister     Diabetes Paternal Grandmother     Learning disabilities Other          Medications have been verified  Objective   /82   Pulse 90   Temp 98 2 °F (36 8 °C) (Tympanic)   Resp 20   Ht 5' 7" (1 702 m)   Wt 113 kg (250 lb)   SpO2 97%   BMI 39 16 kg/m²   No LMP recorded  Physical Exam     Physical Exam  Vitals and nursing note reviewed  Constitutional:       Appearance: Normal appearance  She is well-developed  Cardiovascular:      Rate and Rhythm: Normal rate and regular rhythm  Pulmonary:      Effort: Pulmonary effort is normal       Breath sounds: Normal breath sounds  Musculoskeletal:        Legs:    Neurological:      Mental Status: She is alert  X-ray the ankle and foot show no acute fracture

## 2021-09-15 ENCOUNTER — APPOINTMENT (OUTPATIENT)
Dept: LAB | Facility: HOSPITAL | Age: 36
End: 2021-09-15

## 2021-09-15 DIAGNOSIS — Z00.8 HEALTH EXAMINATION IN POPULATION SURVEYS: ICD-10-CM

## 2021-09-15 LAB
CHOLEST SERPL-MCNC: 145 MG/DL (ref 50–200)
EST. AVERAGE GLUCOSE BLD GHB EST-MCNC: 114 MG/DL
HBA1C MFR BLD: 5.6 %
HDLC SERPL-MCNC: 35 MG/DL
LDLC SERPL CALC-MCNC: 80 MG/DL (ref 0–100)
NONHDLC SERPL-MCNC: 110 MG/DL
TRIGL SERPL-MCNC: 152 MG/DL

## 2021-09-15 PROCEDURE — 36415 COLL VENOUS BLD VENIPUNCTURE: CPT

## 2021-09-15 PROCEDURE — 83036 HEMOGLOBIN GLYCOSYLATED A1C: CPT

## 2021-09-15 PROCEDURE — 80061 LIPID PANEL: CPT

## 2021-09-28 ENCOUNTER — ANNUAL EXAM (OUTPATIENT)
Dept: OBGYN CLINIC | Facility: CLINIC | Age: 36
End: 2021-09-28
Payer: COMMERCIAL

## 2021-09-28 VITALS
WEIGHT: 264.8 LBS | DIASTOLIC BLOOD PRESSURE: 82 MMHG | SYSTOLIC BLOOD PRESSURE: 122 MMHG | BODY MASS INDEX: 41.56 KG/M2 | HEIGHT: 67 IN

## 2021-09-28 DIAGNOSIS — Z01.419 ENCOUNTER FOR ANNUAL ROUTINE GYNECOLOGICAL EXAMINATION: Primary | ICD-10-CM

## 2021-09-28 PROCEDURE — 99395 PREV VISIT EST AGE 18-39: CPT | Performed by: OBSTETRICS & GYNECOLOGY

## 2021-09-28 NOTE — PROGRESS NOTES
Assessment/Plan:    Encounter for annual routine gynecological examination      Subjective      Lilliam Pandey is a 29 y o  female who presents for annual exam  Periods are regular every 26 days, lasting 5 days  Dysmenorrhea:moderate, occurring first 1-2 days of flow  Cyclic symptoms include headache  No intermenstrual bleeding, spotting, or discharge  She denies any urinary or bowel complaints  Current contraception: none  History of abnormal Pap smear: no  Regular self breast exam: yes  History of abnormal mammogram: no  History of abnormal lipids: no  Menstrual History:  OB History        2    Para   1    Term   1       0    AB   1    Living   1       SAB   0    TAB   0    Ectopic   1    Multiple   0    Live Births   1           Obstetric Comments   GDM-diet control, asthma            Patient's last menstrual period was 2021 (exact date)    Period Cycle (Days): 26  Period Duration (Days): 4-5  Period Pattern: Regular  Menstrual Flow: Moderate  Dysmenorrhea: (!) Moderate  Dysmenorrhea Symptoms: Cramping, Headache (Headaches occasionally)    Past Medical History:   Diagnosis Date    Asthma     had vaccine     Diabetes mellitus ([facility] [address] )     gestational DM diet controlled    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    only got  sperm tested, naturally conceived     Lumbar strain 2015    Varicella     chicken pox as child    Visual impairment     eyewear        Family History   Problem Relation Age of Onset    Multiple sclerosis Mother     Skin cancer Mother     Cancer Mother     Other Mother         Myocardial bridge    Diabetes Father     Other Father         Pericarditis    Asthma Maternal Grandmother     Miscarriages / Stillbirths Sister     Diabetes Paternal Grandmother     Learning disabilities Other        The following portions of the patient's history were reviewed and updated as appropriate: allergies, current medications, past family history, past medical history, past social history, past surgical history and problem list     Review of Systems  Pertinent items are noted in HPI  Objective      /82 (BP Location: Right arm, Patient Position: Sitting, Cuff Size: Large)   Ht 5' 7" (1 702 m)   Wt 120 kg (264 lb 12 8 oz)   LMP 09/09/2021 (Exact Date)   BMI 41 47 kg/m²     General:   alert and oriented, in no acute distress   Heart:    Breasts: regular rate and rhythm, S1, S2 normal, no murmur, click, rub or gallop   appear normal, no suspicious masses, no skin or nipple changes or axillary nodes     Lungs: clear to auscultation bilaterally   Abdomen: soft, non-tender, without masses or organomegaly   Vulva: normal   Vagina: normal mucosa   Cervix: no lesions   Uterus: normal size, mobile, non-tender   Adnexa: normal adnexa and no mass, fullness, tenderness

## 2021-12-09 ENCOUNTER — OFFICE VISIT (OUTPATIENT)
Dept: FAMILY MEDICINE CLINIC | Facility: CLINIC | Age: 36
End: 2021-12-09
Payer: COMMERCIAL

## 2021-12-09 VITALS
DIASTOLIC BLOOD PRESSURE: 70 MMHG | SYSTOLIC BLOOD PRESSURE: 112 MMHG | HEIGHT: 67 IN | WEIGHT: 271.6 LBS | BODY MASS INDEX: 42.63 KG/M2 | RESPIRATION RATE: 16 BRPM | HEART RATE: 88 BPM | TEMPERATURE: 97.8 F

## 2021-12-09 DIAGNOSIS — J44.9 CHRONIC OBSTRUCTIVE PULMONARY DISEASE, UNSPECIFIED COPD TYPE (HCC): ICD-10-CM

## 2021-12-09 DIAGNOSIS — R53.83 FATIGUE, UNSPECIFIED TYPE: ICD-10-CM

## 2021-12-09 DIAGNOSIS — Z13.29 SCREENING FOR THYROID DISORDER: ICD-10-CM

## 2021-12-09 DIAGNOSIS — J45.40 MODERATE PERSISTENT ASTHMA WITHOUT COMPLICATION: Primary | ICD-10-CM

## 2021-12-09 DIAGNOSIS — Z13.0 SCREENING FOR DEFICIENCY ANEMIA: ICD-10-CM

## 2021-12-09 DIAGNOSIS — Z13.1 SCREENING FOR DIABETES MELLITUS: ICD-10-CM

## 2021-12-09 DIAGNOSIS — G43.909 MIGRAINE WITHOUT STATUS MIGRAINOSUS, NOT INTRACTABLE, UNSPECIFIED MIGRAINE TYPE: ICD-10-CM

## 2021-12-09 DIAGNOSIS — J30.89 SEASONAL ALLERGIC RHINITIS DUE TO OTHER ALLERGIC TRIGGER: ICD-10-CM

## 2021-12-09 PROCEDURE — 99214 OFFICE O/P EST MOD 30 MIN: CPT | Performed by: NURSE PRACTITIONER

## 2021-12-09 RX ORDER — ALBUTEROL SULFATE 90 UG/1
2 AEROSOL, METERED RESPIRATORY (INHALATION) EVERY 4 HOURS PRN
Qty: 18 G | Refills: 2 | Status: SHIPPED | OUTPATIENT
Start: 2021-12-09

## 2021-12-09 RX ORDER — FLUTICASONE FUROATE AND VILANTEROL 100; 25 UG/1; UG/1
1 POWDER RESPIRATORY (INHALATION) DAILY
Qty: 60 EACH | Refills: 1 | Status: SHIPPED | OUTPATIENT
Start: 2021-12-09

## 2021-12-22 ENCOUNTER — APPOINTMENT (OUTPATIENT)
Dept: URGENT CARE | Facility: CLINIC | Age: 36
End: 2021-12-22

## 2021-12-22 DIAGNOSIS — Z02.1 PRE-EMPLOYMENT HEALTH SCREENING EXAMINATION: Primary | ICD-10-CM

## 2021-12-22 PROCEDURE — 86480 TB TEST CELL IMMUN MEASURE: CPT

## 2021-12-23 NOTE — MISCELLANEOUS
Message   Recorded as Task   Date: 03/30/2017 01:42 PM, Created By: Manuelita Gitelman   Task Name: Care Coordination   Assigned To: Errol Hendrickson   Regarding Patient: Yousif Fofana, Status: Active   Comment:    Manuelita Gitelman - 30 Mar 2017 1:42 PM     TASK CREATED  Wrong code - male infertility is 49 9        Active Problems    1  Allergic rhinitis (477 9) (J30 9)   2  Encounter for gynecological examination without abnormal finding (V72 31) (Z01 419)   3  Infertility   4  Irregular menstrual cycle (626 4) (N92 6)   5  Lumbar strain (847 2) (S39 012A)   6  Moderate persistent asthma (493 90) (J45 40)   7  Screening for STD (sexually transmitted disease) (V74 5) (Z11 3)    Current Meds   1  Breo Ellipta 200-25 MCG/INH Inhalation Aerosol Powder Breath Activated; INHALE 1   PUFFS Daily; Therapy: 98PIE9476 to (Evaluate:88Ixk1045)  Requested for: 14Qim0308; Last   Rx:[id number] Ordered   2  Cetirizine HCl - 10 MG Oral Tablet; TAKE 1 TABLET DAILY AS DIRECTED; Therapy: 41TCV5544 to (Evaluate:79Isq1649); Last Rx:14Jun2016 Ordered   3  CitraNatal [place] 27-1-260 MG Oral Capsule; One po daily; Therapy: 92KIP6431 to (Last Rx:[id number])  Requested for: 70Igg7648 Ordered   4  Ventolin  (90 Base) MCG/ACT Inhalation Aerosol Solution; INHALE 2 PUFFS   EVERY 4 HOURS AS NEEDED; Therapy: 61DWT8690 to (Evaluate:22Jun2017)  Requested for: 35DKR4409; Last   Rx:[id number] Ordered    Allergies    1  Sulfa Drugs    Signatures   Electronically signed by :  Lianna Etienne, ; Mar 30 2017  1:49PM EST                       (Author)
oral

## 2021-12-27 LAB
GAMMA INTERFERON BACKGROUND BLD IA-ACNC: 0.03 IU/ML
M TB IFN-G BLD-IMP: NEGATIVE
M TB IFN-G CD4+ BCKGRND COR BLD-ACNC: 0 IU/ML
M TB IFN-G CD4+ BCKGRND COR BLD-ACNC: 0 IU/ML
MITOGEN IGNF BCKGRD COR BLD-ACNC: >10 IU/ML

## 2022-08-29 ENCOUNTER — APPOINTMENT (OUTPATIENT)
Dept: LAB | Facility: HOSPITAL | Age: 37
End: 2022-08-29
Payer: COMMERCIAL

## 2022-08-29 ENCOUNTER — APPOINTMENT (OUTPATIENT)
Dept: LAB | Facility: HOSPITAL | Age: 37
End: 2022-08-29

## 2022-08-29 DIAGNOSIS — R53.83 FATIGUE, UNSPECIFIED TYPE: ICD-10-CM

## 2022-08-29 DIAGNOSIS — Z00.8 HEALTH EXAMINATION IN POPULATION SURVEY: ICD-10-CM

## 2022-08-29 DIAGNOSIS — Z13.1 SCREENING FOR DIABETES MELLITUS: ICD-10-CM

## 2022-08-29 DIAGNOSIS — Z13.0 SCREENING FOR DEFICIENCY ANEMIA: ICD-10-CM

## 2022-08-29 DIAGNOSIS — Z13.29 SCREENING FOR THYROID DISORDER: ICD-10-CM

## 2022-08-29 LAB
25(OH)D3 SERPL-MCNC: 35.8 NG/ML (ref 30–100)
ALBUMIN SERPL BCP-MCNC: 3.6 G/DL (ref 3.5–5)
ALP SERPL-CCNC: 70 U/L (ref 46–116)
ALT SERPL W P-5'-P-CCNC: 32 U/L (ref 12–78)
ANION GAP SERPL CALCULATED.3IONS-SCNC: 11 MMOL/L (ref 4–13)
AST SERPL W P-5'-P-CCNC: 15 U/L (ref 5–45)
BILIRUB SERPL-MCNC: 0.43 MG/DL (ref 0.2–1)
BUN SERPL-MCNC: 10 MG/DL (ref 5–25)
CALCIUM SERPL-MCNC: 8.9 MG/DL (ref 8.3–10.1)
CHLORIDE SERPL-SCNC: 102 MMOL/L (ref 96–108)
CHOLEST SERPL-MCNC: 146 MG/DL
CO2 SERPL-SCNC: 26 MMOL/L (ref 21–32)
CREAT SERPL-MCNC: 0.84 MG/DL (ref 0.6–1.3)
ERYTHROCYTE [DISTWIDTH] IN BLOOD BY AUTOMATED COUNT: 12.2 % (ref 11.6–15.1)
GFR SERPL CREATININE-BSD FRML MDRD: 89 ML/MIN/1.73SQ M
GLUCOSE SERPL-MCNC: 98 MG/DL (ref 65–140)
HCT VFR BLD AUTO: 38.8 % (ref 34.8–46.1)
HDLC SERPL-MCNC: 38 MG/DL
HGB BLD-MCNC: 12.5 G/DL (ref 11.5–15.4)
LDLC SERPL CALC-MCNC: 82 MG/DL (ref 0–100)
MCH RBC QN AUTO: 29.1 PG (ref 26.8–34.3)
MCHC RBC AUTO-ENTMCNC: 32.2 G/DL (ref 31.4–37.4)
MCV RBC AUTO: 90 FL (ref 82–98)
NONHDLC SERPL-MCNC: 108 MG/DL
PLATELET # BLD AUTO: 295 THOUSANDS/UL (ref 149–390)
PMV BLD AUTO: 9.8 FL (ref 8.9–12.7)
POTASSIUM SERPL-SCNC: 3.7 MMOL/L (ref 3.5–5.3)
PROT SERPL-MCNC: 7.7 G/DL (ref 6.4–8.4)
RBC # BLD AUTO: 4.3 MILLION/UL (ref 3.81–5.12)
SODIUM SERPL-SCNC: 139 MMOL/L (ref 135–147)
TRIGL SERPL-MCNC: 129 MG/DL
TSH SERPL DL<=0.05 MIU/L-ACNC: 1.98 UIU/ML (ref 0.45–4.5)
WBC # BLD AUTO: 7.25 THOUSAND/UL (ref 4.31–10.16)

## 2022-08-29 PROCEDURE — 36415 COLL VENOUS BLD VENIPUNCTURE: CPT

## 2022-08-29 PROCEDURE — 85027 COMPLETE CBC AUTOMATED: CPT

## 2022-08-29 PROCEDURE — 84443 ASSAY THYROID STIM HORMONE: CPT

## 2022-08-29 PROCEDURE — 80061 LIPID PANEL: CPT

## 2022-08-29 PROCEDURE — 82306 VITAMIN D 25 HYDROXY: CPT

## 2022-08-29 PROCEDURE — 80053 COMPREHEN METABOLIC PANEL: CPT

## 2022-08-29 PROCEDURE — 83036 HEMOGLOBIN GLYCOSYLATED A1C: CPT

## 2022-08-30 LAB
EST. AVERAGE GLUCOSE BLD GHB EST-MCNC: 123 MG/DL
HBA1C MFR BLD: 5.9 %

## 2022-10-18 ENCOUNTER — OFFICE VISIT (OUTPATIENT)
Dept: FAMILY MEDICINE CLINIC | Facility: CLINIC | Age: 37
End: 2022-10-18
Payer: COMMERCIAL

## 2022-10-18 VITALS
RESPIRATION RATE: 16 BRPM | HEIGHT: 67 IN | HEART RATE: 80 BPM | DIASTOLIC BLOOD PRESSURE: 72 MMHG | WEIGHT: 279 LBS | BODY MASS INDEX: 43.79 KG/M2 | SYSTOLIC BLOOD PRESSURE: 114 MMHG

## 2022-10-18 DIAGNOSIS — J45.40 MODERATE PERSISTENT ASTHMA WITHOUT COMPLICATION: ICD-10-CM

## 2022-10-18 DIAGNOSIS — G43.909 MIGRAINE WITHOUT STATUS MIGRAINOSUS, NOT INTRACTABLE, UNSPECIFIED MIGRAINE TYPE: ICD-10-CM

## 2022-10-18 DIAGNOSIS — R73.9 HYPERGLYCEMIA: Primary | ICD-10-CM

## 2022-10-18 DIAGNOSIS — F33.8 SEASONAL AFFECTIVE DISORDER (HCC): ICD-10-CM

## 2022-10-18 DIAGNOSIS — E78.5 DYSLIPIDEMIA: ICD-10-CM

## 2022-10-18 DIAGNOSIS — E66.01 MORBID OBESITY WITH BMI OF 40.0-44.9, ADULT (HCC): ICD-10-CM

## 2022-10-18 PROCEDURE — 99214 OFFICE O/P EST MOD 30 MIN: CPT | Performed by: FAMILY MEDICINE

## 2022-10-18 RX ORDER — ALBUTEROL SULFATE 90 UG/1
2 AEROSOL, METERED RESPIRATORY (INHALATION) EVERY 4 HOURS PRN
Qty: 18 G | Refills: 2 | Status: SHIPPED | OUTPATIENT
Start: 2022-10-18

## 2022-10-18 RX ORDER — BUPROPION HYDROCHLORIDE 150 MG/1
150 TABLET ORAL EVERY MORNING
Qty: 30 TABLET | Refills: 5 | Status: SHIPPED | OUTPATIENT
Start: 2022-10-18

## 2022-10-18 NOTE — PATIENT INSTRUCTIONS
Low sugar low-fat diet recommended   I recommend exercising at least 3 times weekly for 30-40 minutes this will increase your HDL, help symptomatology of of mood, and help weight   Start Wellbutrin  mg 1 tablet daily   Return in 3 months for office visit blood work sooner if needed

## 2022-10-18 NOTE — ASSESSMENT & PLAN NOTE
Very well controlled if patient start using albuterol more than 2 to 3 times a week she will call my office

## 2022-10-18 NOTE — PROGRESS NOTES
Name: Erika       : 1985      MRN: [id number]  Encounter Provider: Isacc Pickett DO  Encounter Date: 10/18/2022   Encounter department: [facility] PRIMARY CARE    Assessment & Plan     1  Asthma, well controlled albuterol reordered  2  Hyperglycemia low sugar low-carbohydrate diet recommended  3  Migraine, stable on Imitrex  4  Dyslipidemia increase exercise increase HDL  5  Seasonal affective disorder, Wellbutrin ordered risk and benefit discussed  Especially increased risk of suicidal ideation  The benefits of exercise for symptom control was discussed  6  BMI 43 7 diet exercise weight loss recommended  7  Return in 3 months for office visit blood work sooner if needed          1  Hyperglycemia  Assessment & Plan:  Low sugar low-carbohydrate diet recommended    Orders:  -     Comprehensive metabolic panel; Future; Expected date: 2023  -     Hemoglobin A1C; Future; Expected date: 2023  -     Lipid Panel with Direct LDL reflex; Future; Expected date: 2023    2  Moderate persistent asthma without complication  Assessment & Plan:  Very well controlled if patient start using albuterol more than 2 to 3 times a week she will call my office    Orders:  -     albuterol (Ventolin HFA) 90 mcg/act inhaler; Inhale 2 puffs every 4 (four) hours as needed for wheezing or shortness of breath  -     Comprehensive metabolic panel; Future; Expected date: 2023  -     Hemoglobin A1C; Future; Expected date: 2023  -     Lipid Panel with Direct LDL reflex; Future; Expected date: 2023    3  Migraine without status migrainosus, not intractable, unspecified migraine type  Assessment & Plan:  Stable on Imitrex    Orders:  -     Comprehensive metabolic panel; Future; Expected date: 2023  -     Hemoglobin A1C; Future; Expected date: 2023  -     Lipid Panel with Direct LDL reflex; Future; Expected date: 2023    4   Dyslipidemia  Assessment & Plan:  Increase exercise increase HDL    Orders:  -     Comprehensive metabolic panel; Future; Expected date: 01/18/2023  -     Hemoglobin A1C; Future; Expected date: 01/18/2023  -     Lipid Panel with Direct LDL reflex; Future; Expected date: 01/18/2023    5  Seasonal affective disorder ([place] [address] )  Assessment & Plan:  Start Wellbutrin  mg daily  Risk and benefit discussed especially increased risk of suicidal ideation  Patient declines counseling at the present time    Orders:  -     buPROPion (Wellbutrin XL) 150 mg 24 hr tablet; Take 1 tablet (150 mg total) by mouth every morning  -     Comprehensive metabolic panel; Future; Expected date: 01/18/2023  -     Hemoglobin A1C; Future; Expected date: 01/18/2023  -     Lipid Panel with Direct LDL reflex; Future; Expected date: 01/18/2023    6  Morbid obesity with BMI of 40 0-44 9, adult ([place] [address] )  Assessment & Plan:  BMI 43 7 patient gained 8 lb diet exercise weight loss recommended        BMI Counseling: Body mass index is 43 7 kg/m²  The BMI is above normal  Nutrition recommendations include moderation in carbohydrate intake and reducing intake of cholesterol  Exercise recommendations include exercising 3-5 times per week  Rationale for BMI follow-up plan is due to patient being overweight or obese  Depression Screening and Follow-up Plan: Patient's depression screening was positive with a PHQ-2 score of 3  Their PHQ-9 score was 5  Subjective      Patient to review blood work also although her chief complaint says anxiety is not anxiety  Patient feels she gets “blue” in the winter discussed his own affective disorder  Patient like to start Wellbutrin risk and benefit discussed  Review of Systems   Constitutional:        HPI   HENT: Negative  Eyes: Negative  Respiratory:        Patient uses her albuterol less than once a week usually less than once a month   Cardiovascular: Negative  Gastrointestinal: Negative  Endocrine: Negative      Genitourinary: Negative  Musculoskeletal: Negative  Skin: Negative  Allergic/Immunologic: Negative  Neurological: Negative  Hematological: Negative  Psychiatric/Behavioral:        MDI       Current Outpatient Medications on File Prior to Visit   Medication Sig   • SUMAtriptan (IMITREX) 50 mg tablet Take 1 tablet (50 mg total) by mouth once as needed for migraine for up to 1 dose   • [DISCONTINUED] albuterol (2 5 mg/3 mL) 0 083 % nebulizer solution Take 1 vial (2 5 mg total) by nebulization every 6 (six) hours as needed for wheezing or shortness of breath   • [DISCONTINUED] albuterol (Ventolin HFA) 90 mcg/act inhaler Inhale 2 puffs every 4 (four) hours as needed for wheezing or shortness of breath   • [DISCONTINUED] fluticasone-vilanterol (BREO ELLIPTA) 100-25 mcg/inh inhaler Inhale 1 puff daily Rinse mouth after use  (Patient not taking: Reported on 10/18/2022)       Objective     /72 (BP Location: Left arm, Patient Position: Sitting, Cuff Size: Large)   Pulse 80   Resp 16   Ht 5' 7" (1 702 m)   Wt 127 kg (279 lb)   BMI 43 70 kg/m²     Physical Exam  Vitals and nursing note reviewed  Constitutional:       Appearance: She is obese  HENT:      Head: Normocephalic and atraumatic  Eyes:      General: No scleral icterus  Neck:      Vascular: No carotid bruit  Cardiovascular:      Rate and Rhythm: Normal rate and regular rhythm  Heart sounds: Normal heart sounds  Pulmonary:      Effort: Pulmonary effort is normal       Breath sounds: Normal breath sounds  Abdominal:      General: Bowel sounds are normal       Palpations: Abdomen is soft  Tenderness: There is no abdominal tenderness  Musculoskeletal:      Cervical back: Neck supple  Skin:     General: Skin is warm and dry  Neurological:      General: No focal deficit present  Mental Status: She is alert     Psychiatric:         Mood and Affect: Mood normal        Du Washburn DO No

## 2022-10-18 NOTE — ASSESSMENT & PLAN NOTE
Start Wellbutrin  mg daily  Risk and benefit discussed especially increased risk of suicidal ideation    Patient declines counseling at the present time

## 2023-02-08 ENCOUNTER — APPOINTMENT (OUTPATIENT)
Dept: LAB | Facility: HOSPITAL | Age: 38
End: 2023-02-08

## 2023-02-08 DIAGNOSIS — J45.40 MODERATE PERSISTENT ASTHMA WITHOUT COMPLICATION: ICD-10-CM

## 2023-02-08 DIAGNOSIS — R73.9 HYPERGLYCEMIA: ICD-10-CM

## 2023-02-08 DIAGNOSIS — E78.5 DYSLIPIDEMIA: ICD-10-CM

## 2023-02-08 DIAGNOSIS — G43.909 MIGRAINE WITHOUT STATUS MIGRAINOSUS, NOT INTRACTABLE, UNSPECIFIED MIGRAINE TYPE: ICD-10-CM

## 2023-02-08 DIAGNOSIS — F33.8 SEASONAL AFFECTIVE DISORDER (HCC): ICD-10-CM

## 2023-02-08 LAB
ALBUMIN SERPL BCP-MCNC: 4.1 G/DL (ref 3.5–5)
ALP SERPL-CCNC: 64 U/L (ref 34–104)
ALT SERPL W P-5'-P-CCNC: 19 U/L (ref 7–52)
ANION GAP SERPL CALCULATED.3IONS-SCNC: 7 MMOL/L (ref 4–13)
AST SERPL W P-5'-P-CCNC: 14 U/L (ref 13–39)
BILIRUB SERPL-MCNC: 0.66 MG/DL (ref 0.2–1)
BUN SERPL-MCNC: 11 MG/DL (ref 5–25)
CALCIUM SERPL-MCNC: 8.8 MG/DL (ref 8.4–10.2)
CHLORIDE SERPL-SCNC: 104 MMOL/L (ref 96–108)
CHOLEST SERPL-MCNC: 134 MG/DL
CO2 SERPL-SCNC: 26 MMOL/L (ref 21–32)
CREAT SERPL-MCNC: 0.85 MG/DL (ref 0.6–1.3)
GFR SERPL CREATININE-BSD FRML MDRD: 88 ML/MIN/1.73SQ M
GLUCOSE P FAST SERPL-MCNC: 116 MG/DL (ref 65–99)
HDLC SERPL-MCNC: 38 MG/DL
LDLC SERPL CALC-MCNC: 69 MG/DL (ref 0–100)
POTASSIUM SERPL-SCNC: 4.1 MMOL/L (ref 3.5–5.3)
PROT SERPL-MCNC: 6.9 G/DL (ref 6.4–8.4)
SODIUM SERPL-SCNC: 137 MMOL/L (ref 135–147)
TRIGL SERPL-MCNC: 134 MG/DL

## 2023-02-09 ENCOUNTER — OFFICE VISIT (OUTPATIENT)
Dept: FAMILY MEDICINE CLINIC | Facility: CLINIC | Age: 38
End: 2023-02-09

## 2023-02-09 ENCOUNTER — TELEPHONE (OUTPATIENT)
Dept: FAMILY MEDICINE CLINIC | Facility: CLINIC | Age: 38
End: 2023-02-09

## 2023-02-09 VITALS
WEIGHT: 284.5 LBS | OXYGEN SATURATION: 98 % | SYSTOLIC BLOOD PRESSURE: 132 MMHG | TEMPERATURE: 97.3 F | BODY MASS INDEX: 44.65 KG/M2 | DIASTOLIC BLOOD PRESSURE: 80 MMHG | HEART RATE: 88 BPM | HEIGHT: 67 IN

## 2023-02-09 DIAGNOSIS — Z82.49 FAMILY HISTORY OF DISEASE OF AORTA: ICD-10-CM

## 2023-02-09 DIAGNOSIS — E66.01 MORBID OBESITY WITH BMI OF 40.0-44.9, ADULT (HCC): ICD-10-CM

## 2023-02-09 DIAGNOSIS — R73.9 HYPERGLYCEMIA: ICD-10-CM

## 2023-02-09 DIAGNOSIS — E78.5 DYSLIPIDEMIA: ICD-10-CM

## 2023-02-09 DIAGNOSIS — F33.8 SEASONAL AFFECTIVE DISORDER (HCC): Primary | ICD-10-CM

## 2023-02-09 DIAGNOSIS — J45.41 MODERATE PERSISTENT ASTHMA WITH ACUTE EXACERBATION: ICD-10-CM

## 2023-02-09 LAB
EST. AVERAGE GLUCOSE BLD GHB EST-MCNC: 126 MG/DL
HBA1C MFR BLD: 6 %

## 2023-02-09 NOTE — PATIENT INSTRUCTIONS
Discontinue Wellbutrin  Start Contrave 1 tablet daily for 1 week, then 1 tablet twice daily for 1 week, then 2 tablets twice daily thereafter  Complete echocardiogram  Diet exercise weight loss is recommended especially increase exercise to increase good cholesterol and decreasing carbohydrates and sugar  Recheck 4 weeks

## 2023-02-09 NOTE — PROGRESS NOTES
Name: Nichole Hopper      : 1985      MRN: [id number]  Encounter Provider: Shandra Trevizo DO  Encounter Date: 2023   Encounter department: [facility] PRIMARY CARE    Assessment & Plan     1  SAD  2  BMI 44 56  Discontinue Wellbutrin changed to Contrave which contains Wellbutrin  To start 1 daily for 1 week 1 twice a day for the second week then 2 tablets twice daily after that  3  Asthma, stable lungs are clear  4  Hyperglycemia diet controlled  5  Dyslipidemia increase exercise to increase HDL  6  Family history of aortic root dilation check echocardiogram  7  Recheck 4 weeks  1  Seasonal affective disorder ([facility] [address] )  Assessment & Plan:  Continue Wellbutrin and Contrave as above which contains Wellbutrin    Orders:  -     Naltrexone-buPROPion HCl ER 8-90 MG TB12; Take 1 tablet once daily for 1 week, 1 tablet twice daily for the second week then 2 tablets twice daily after that    2  Moderate persistent asthma with acute exacerbation  Assessment & Plan:  Able lungs are clear      3  Dyslipidemia  Assessment & Plan:  Increase exercise to increase HDL      4  Hyperglycemia  Assessment & Plan:  Low sugar low carbohydrate diet      5  Morbid obesity with BMI of 40 0-44 9, adult [facility])  Assessment & Plan:  Risk and benefit of Contrave discussed we will start    Orders:  -     Naltrexone-buPROPion HCl ER 8-90 MG TB12; Take 1 tablet once daily for 1 week, 1 tablet twice daily for the second week then 2 tablets twice daily after that    6  Family history of disease of aorta  -     Echo complete w/ contrast if indicated; Future; Expected date: 2023      BMI Counseling: Body mass index is 44 56 kg/m²  The BMI is above normal  Nutrition recommendations include moderation in carbohydrate intake and reducing intake of cholesterol  Exercise recommendations include exercising 3-5 times per week  Rationale for BMI follow-up plan is due to patient being overweight or obese           Subjective Blood work was discussed  Patient did gain 5 pounds since last office visit patient very interested in Balaji Meza for weight loss  Discussed risk and benefit  We will discontinue Wellbutrin and start Contrave  Offered patient to refer to medical bariatric specialist patient wishes to started in this office  Review of Systems   Constitutional: Negative  HENT: Negative  Eyes: Negative  Respiratory: Negative  Cardiovascular:        Patient's child was diagnosed with dilated aortic root and the pediatric cardiologist recommended patient get an echocardiogram to rule out any genetic component   Gastrointestinal: Negative  Endocrine: Negative  Genitourinary: Negative  Musculoskeletal: Negative  Skin: Negative  Allergic/Immunologic: Negative  Neurological: Negative  Hematological: Negative  Psychiatric/Behavioral: Negative  Current Outpatient Medications on File Prior to Visit   Medication Sig   • albuterol (Ventolin HFA) 90 mcg/act inhaler Inhale 2 puffs every 4 (four) hours as needed for wheezing or shortness of breath   • SUMAtriptan (IMITREX) 50 mg tablet Take 1 tablet (50 mg total) by mouth once as needed for migraine for up to 1 dose   • [DISCONTINUED] buPROPion (Wellbutrin XL) 150 mg 24 hr tablet Take 1 tablet (150 mg total) by mouth every morning       Objective     /80 (BP Location: Right arm, Patient Position: Sitting, Cuff Size: Large)   Pulse 88   Temp (!) 97 3 °F (36 3 °C) (Temporal)   Ht 5' 7" (1 702 m)   Wt 129 kg (284 lb 8 oz)   SpO2 98%   BMI 44 56 kg/m²     Physical Exam  Vitals and nursing note reviewed  Constitutional:       Appearance: Normal appearance  HENT:      Head: Normocephalic and atraumatic  Mouth/Throat:      Mouth: Mucous membranes are moist    Eyes:      General: No scleral icterus  Cardiovascular:      Rate and Rhythm: Normal rate and regular rhythm  Heart sounds: Normal heart sounds  No murmur heard    Pulmonary: Effort: Pulmonary effort is normal       Breath sounds: Normal breath sounds  Musculoskeletal:      Cervical back: Neck supple  No rigidity  Skin:     General: Skin is warm and dry  Neurological:      General: No focal deficit present  Mental Status: She is alert     Psychiatric:         Mood and Affect: Mood normal        Du Washburn DO

## 2023-02-09 NOTE — TELEPHONE ENCOUNTER
[address]  help_outlineNEED HELP? [phone]    Pharmacy claim for Contrave 8-90MG er tablets, prescriber Yissel Germain been rejected and requires prior authorization for coverage  Non-preferred medications may have a higher patient co-pay than the health insurance plan's preferred medications  Please research and evaluate therapy options for this patient  You can learn more about alternative medications by [facility] at [phone], or checking their online formulary  Macie Erickson ([place]: [id number])  [facility] has not yet replied to your PA request  You may close this dialog, return to your dashboard, and perform other tasks  To check for an update later, open this request again from your dashboard  If [facility] has not replied within 72 hours for urgent requests and up to 15 days for standard requests, please contact Gurdeep Miller at [phone]

## 2023-02-10 NOTE — TELEPHONE ENCOUNTER
Outcome Denied on February 9  PA Case: [id number],   Status: Denied,   Denial Rationale:   Coverage for Bertha Aparicio is denied  It does not meet medical necessity  Bertha Aparicio has been requested for the treatment of Obesity  The information provided by your prescriber does not meet [employer] [facility]'s guideline  The guideline used is: Coverage Exception Criteria, and Weight Loss Agents Prior Authorization with Quantity Limit Criteria  Information provided does not show that the policy requirements have been met  The requirement(s) not met are: The requested drug is not on your plan's formulary  In order to meet criteria for approval, the prescriber must provide information supporting trial and inadequate response of at least two (2) formulary alternative medications (where applicable; e g , Qsymia, phentermine) including dates and duration of use, and reasons for failure, OR specified intolerance, hypersensitivity, or a specified FDA-labeled contraindication to ALL formulary alternatives  Therefore, coverage for CONTRAVE is denied  NOTE: Your plan will review for QSYMIA CAP, PHENTERMINE 37 5 MG TAB with prior authorization  Questions? Contact [phone]

## 2023-02-14 NOTE — PROGRESS NOTES
Patient c/o pain in left breast for about a week  Noticed a dimple-like appearance to skin in 12 o'clock position of the left breast  Only notices this when standing up and leaning forward  Pain described as dull and achy and located laterally in breast   There is no breast lump, no breast discharge, no discoloration  There was no breast injury  Patient has no history of breast disease  Family history of breast cancer: none  Caffeine intake: daily but recently cut back  She is in [address] program  Last mammogram: never    Current Outpatient Medications on File Prior to Visit   Medication Sig Dispense Refill   • albuterol (Ventolin HFA) 90 mcg/act inhaler Inhale 2 puffs every 4 (four) hours as needed for wheezing or shortness of breath 18 g 2   • Naltrexone-buPROPion HCl ER 8-90 MG TB12 Take 1 tablet once daily for 1 week, 1 tablet twice daily for the second week then 2 tablets twice daily after that 120 tablet 5   • SUMAtriptan (IMITREX) 50 mg tablet Take 1 tablet (50 mg total) by mouth once as needed for migraine for up to 1 dose 9 tablet 0     No current facility-administered medications on file prior to visit  Allergies   Allergen Reactions   • Sulfa Antibiotics Other (See Comments)     Pt does not know what happens when she takes was a child when had the allergy           Vitals:    02/15/23 0925   BP: 114/76       EXAM  breasts examined standing, sitting and lying  Left breast- + dimple in adipose tissue noted when standing and leaning forward only  No dimpling noted when seated or when lying down  Mildly tender to palpation on lateral aspect of breast  No masses, no discharge, no discoloration, no retraction, no thickening  Right breast- no masses, no discharge, no discoloration, no tenderness, no retraction, no dimpling, no thickening    Nodes- no cervical, subclavicular, or axillary node enlargment      Assessment/Plan    1  Breast pain, left  - suspect "dimpling" is just adipose tissue  Will place orders for left breast US and diagnostic mammo to r/o cause for breast pain  Counseled on decreasing caffeine intake and avoiding underwire/poor fitting bras  - US breast left limited (diagnostic);  Future  - Mammo diagnostic left w 3d & cad; Future

## 2023-02-15 ENCOUNTER — OFFICE VISIT (OUTPATIENT)
Dept: OBGYN CLINIC | Facility: CLINIC | Age: 38
End: 2023-02-15

## 2023-02-15 VITALS
WEIGHT: 279.4 LBS | HEIGHT: 67 IN | SYSTOLIC BLOOD PRESSURE: 114 MMHG | BODY MASS INDEX: 43.85 KG/M2 | DIASTOLIC BLOOD PRESSURE: 76 MMHG

## 2023-02-15 DIAGNOSIS — N64.4 BREAST PAIN, LEFT: Primary | ICD-10-CM

## 2023-02-15 RX ORDER — BUPROPION HYDROCHLORIDE 150 MG/1
150 TABLET ORAL DAILY
COMMUNITY

## 2023-03-16 ENCOUNTER — ANNUAL EXAM (OUTPATIENT)
Dept: OBGYN CLINIC | Facility: CLINIC | Age: 38
End: 2023-03-16
Payer: COMMERCIAL

## 2023-03-16 VITALS
HEIGHT: 67 IN | SYSTOLIC BLOOD PRESSURE: 104 MMHG | BODY MASS INDEX: 44.64 KG/M2 | WEIGHT: 284.4 LBS | DIASTOLIC BLOOD PRESSURE: 84 MMHG

## 2023-03-16 DIAGNOSIS — Z01.419 ROUTINE GYNECOLOGICAL EXAMINATION: Primary | ICD-10-CM

## 2023-03-16 DIAGNOSIS — N93.9 ABNORMAL UTERINE BLEEDING: ICD-10-CM

## 2023-04-24 ENCOUNTER — HOSPITAL ENCOUNTER (OUTPATIENT)
Dept: ULTRASOUND IMAGING | Facility: MEDICAL CENTER | Age: 38
Discharge: HOME | End: 2023-04-24

## 2023-04-24 DIAGNOSIS — N93.9 ABNORMAL UTERINE BLEEDING: ICD-10-CM

## 2023-05-05 ENCOUNTER — OFFICE VISIT (OUTPATIENT)
Dept: URGENT CARE | Facility: CLINIC | Age: 38
End: 2023-05-05

## 2023-05-05 VITALS
SYSTOLIC BLOOD PRESSURE: 142 MMHG | HEART RATE: 101 BPM | HEIGHT: 67 IN | BODY MASS INDEX: 44.1 KG/M2 | DIASTOLIC BLOOD PRESSURE: 80 MMHG | TEMPERATURE: 98.3 F | RESPIRATION RATE: 18 BRPM | WEIGHT: 281 LBS | OXYGEN SATURATION: 97 %

## 2023-05-05 DIAGNOSIS — J02.9 ACUTE PHARYNGITIS, UNSPECIFIED ETIOLOGY: Primary | ICD-10-CM

## 2023-05-05 LAB — S PYO AG THROAT QL: NEGATIVE

## 2023-05-05 RX ORDER — AMOXICILLIN 500 MG/1
500 CAPSULE ORAL EVERY 8 HOURS SCHEDULED
Qty: 30 CAPSULE | Refills: 0 | Status: SHIPPED | OUTPATIENT
Start: 2023-05-05 | End: 2023-05-15

## 2023-05-05 NOTE — PROGRESS NOTES
"[facility]        NAME: Vazquez Tariq is a 40 y o  female  : 1985    MRN: [id number]  DATE: May 5, 2023  TIME: 6:42 PM    /80   Pulse 101   Temp 98 3 °F (36 8 °C)   Resp 18   Ht 5' 7\" (1 702 m)   Wt 127 kg (281 lb)   SpO2 97%   BMI 44 01 kg/m²     Assessment and Plan   Acute pharyngitis, unspecified etiology [J02 9]  1  Acute pharyngitis, unspecified etiology  amoxicillin (AMOXIL) 500 mg capsule    POCT rapid strepA    Throat culture            Patient Instructions       Follow up with PCP in 3-5 days  Proceed to  ER if symptoms worsen  Chief Complaint     Chief Complaint   Patient presents with    Sore Throat     Sore throat, body aches x 3 days  Patient states she has been taking sudafed with no relief  History of Present Illness       Pt with sore throat fever body aches x 3 days      Review of Systems   Review of Systems   Constitutional: Positive for fever  HENT: Positive for congestion and sore throat  Eyes: Negative  Respiratory: Negative  Cardiovascular: Negative  Gastrointestinal: Negative  Endocrine: Negative  Genitourinary: Negative  Musculoskeletal: Negative  Skin: Negative  Allergic/Immunologic: Negative  Neurological: Negative  Hematological: Negative  Psychiatric/Behavioral: Negative  All other systems reviewed and are negative          Current Medications       Current Outpatient Medications:     albuterol (Ventolin HFA) 90 mcg/act inhaler, Inhale 2 puffs every 4 (four) hours as needed for wheezing or shortness of breath, Disp: 18 g, Rfl: 2    amoxicillin (AMOXIL) 500 mg capsule, Take 1 capsule (500 mg total) by mouth every 8 (eight) hours for 10 days, Disp: 30 capsule, Rfl: 0    buPROPion (WELLBUTRIN XL) 150 mg 24 hr tablet, Take 150 mg by mouth daily (Patient not taking: Reported on 3/16/2023), Disp: , Rfl:     Naltrexone-buPROPion HCl ER 8-90 MG TB12, Take 1 tablet once daily for 1 week, 1 tablet twice daily " "for the second week then 2 tablets twice daily after that (Patient not taking: Reported on 3/16/2023), Disp: 120 tablet, Rfl: 5    Prenatal Vit-Fe Fumarate-FA (PRENATAL VITAMIN PO), Take by mouth (Patient not taking: Reported on 5/5/2023), Disp: , Rfl:     Current Allergies     Allergies as of 05/05/2023 - Reviewed 05/05/2023   Allergen Reaction Noted    Sulfa antibiotics Other (See Comments) 08/23/2012            The following portions of the patient's history were reviewed and updated as appropriate: allergies, current medications, past family history, past medical history, past social history, past surgical history and problem list      Past Medical History:   Diagnosis Date    Asthma     had vaccine     Diabetes mellitus ([facility] [address] )     gestational DM diet controlled    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    only got  sperm tested, naturally conceived     Lumbar strain 7/16/2015    Varicella     chicken pox as child    Visual impairment     eyewear        Past Surgical History:   Procedure Laterality Date    FOOT SURGERY      WISDOM TOOTH EXTRACTION         Family History   Problem Relation Age of Onset    Multiple sclerosis Mother     Skin cancer Mother     Cancer Mother     Other Mother         Myocardial bridge    Diabetes Father     Other Father         Pericarditis    Miscarriages / Stillbirths Sister     Heart murmur Son         Mildly dialated Aortic root    Asthma Maternal Grandmother     Diabetes Paternal Grandmother     Learning disabilities Other     Breast cancer Neg Hx     Colon cancer Neg Hx     Ovarian cancer Neg Hx     Uterine cancer Neg Hx     Cervical cancer Neg Hx          Medications have been verified  Objective   /80   Pulse 101   Temp 98 3 °F (36 8 °C)   Resp 18   Ht 5' 7\" (1 702 m)   Wt 127 kg (281 lb)   SpO2 97%   BMI 44 01 kg/m²        Physical Exam     Physical Exam  Vitals and nursing note reviewed     Constitutional:       Appearance: She is " well-developed and normal weight  HENT:      Head: Normocephalic and atraumatic  Right Ear: Tympanic membrane and ear canal normal       Left Ear: Tympanic membrane and ear canal normal       Nose: Congestion and rhinorrhea present  Mouth/Throat:      Mouth: No oral lesions  Pharynx: Uvula midline  Posterior oropharyngeal erythema present  No pharyngeal swelling or uvula swelling  Tonsils: No tonsillar exudate or tonsillar abscesses  Eyes:      Conjunctiva/sclera: Conjunctivae normal       Pupils: Pupils are equal, round, and reactive to light  Cardiovascular:      Rate and Rhythm: Normal rate and regular rhythm  Heart sounds: Normal heart sounds  Pulmonary:      Effort: Pulmonary effort is normal       Breath sounds: Normal breath sounds  Abdominal:      General: Bowel sounds are normal       Palpations: Abdomen is soft  Musculoskeletal:      Cervical back: Normal range of motion and neck supple  Lymphadenopathy:      Cervical: Cervical adenopathy present  Skin:     General: Skin is warm  Neurological:      General: No focal deficit present  Mental Status: She is alert

## 2023-05-07 LAB — BACTERIA THROAT CULT: NORMAL

## 2023-05-08 LAB — BACTERIA THROAT CULT: NORMAL

## 2023-05-18 ENCOUNTER — OFFICE VISIT (OUTPATIENT)
Dept: DERMATOLOGY | Facility: CLINIC | Age: 38
End: 2023-05-18

## 2023-05-18 VITALS — WEIGHT: 280 LBS | HEIGHT: 67 IN | TEMPERATURE: 97.8 F | BODY MASS INDEX: 43.95 KG/M2

## 2023-05-18 DIAGNOSIS — L85.8 KERATOSIS PILARIS: ICD-10-CM

## 2023-05-18 DIAGNOSIS — L73.2 HIDRADENITIS SUPPURATIVA: ICD-10-CM

## 2023-05-18 DIAGNOSIS — D48.5 NEOPLASM OF UNCERTAIN BEHAVIOR OF SKIN: ICD-10-CM

## 2023-05-18 DIAGNOSIS — Z12.83 SCREENING FOR SKIN CANCER: Primary | ICD-10-CM

## 2023-05-18 DIAGNOSIS — R73.03 PREDIABETES: ICD-10-CM

## 2023-05-18 DIAGNOSIS — D23.9 DERMATOFIBROMA: ICD-10-CM

## 2023-05-18 DIAGNOSIS — D18.01 CHERRY ANGIOMA: ICD-10-CM

## 2023-05-18 DIAGNOSIS — D22.9 MULTIPLE NEVI: ICD-10-CM

## 2023-05-18 RX ORDER — CLINDAMYCIN PHOSPHATE 10 UG/ML
LOTION TOPICAL 2 TIMES DAILY
Qty: 60 ML | Refills: 3 | Status: SHIPPED | OUTPATIENT
Start: 2023-05-18

## 2023-05-18 RX ORDER — SPIRONOLACTONE 50 MG/1
TABLET, FILM COATED ORAL
Qty: 60 TABLET | Refills: 3 | Status: SHIPPED | OUTPATIENT
Start: 2023-05-18

## 2023-05-18 NOTE — PROGRESS NOTES
"[facility] Note     Patient Name: Mary Oconnor  Encounter Date: 05/18/23     Have you been cared for by a [facility] in the last 3 years and, if so, which description applies to you? NO  I am considered a \"new\" patient and must complete all patient intake questions  I am FEMALE/of child-bearing potential     REVIEW OF SYSTEMS:  Have you recently had or currently have any of the following? · Recent fever or chills? No  · Any non-healing wound? YES, groin, thighs  · Are you pregnant or planning to become pregnant? YES, planning  · Are you currently or planning to be nursing or breast feeding? No   PAST MEDICAL HISTORY:  Have you personally ever had or currently have any of the following? If \"YES,\" then please provide more detail  · Skin cancer (such as Melanoma, Basal Cell Carcinoma, Squamous Cell Carcinoma? No  · Tuberculosis, HIV/AIDS, Hepatitis B or C: No  · Systemic Immunosuppression such as Diabetes, Biologic or Immunotherapy, Chemotherapy, Organ Transplantation, Bone Marrow Transplantation No  · Radiation Treatment No   FAMILY HISTORY:  Any \"first degree relatives\" (parent, brother, sister, or child) with the following? • Skin Cancer, Pancreatic or Other Cancer? YES, mother had skin cancer on shoulder and skin (10 years ago)   PATIENT EXPERIENCE:    • Do you want the Dermatologist to perform a COMPLETE skin exam today including a clinical examination under the \"bra and underwear\" areas? Yes  • If necessary, do we have your permission to call and leave a detailed message on your Preferred Phone number that includes your specific medical information?   Yes      Allergies   Allergen Reactions   • Sulfa Antibiotics Other (See Comments)     Pt does not know what happens when she takes was a child when had the allergy      Current Outpatient Medications:   •  albuterol (Ventolin HFA) 90 mcg/act inhaler, Inhale 2 puffs every 4 (four) hours as needed for wheezing or " shortness of breath, Disp: 18 g, Rfl: 2  •  buPROPion (WELLBUTRIN XL) 150 mg 24 hr tablet, Take 150 mg by mouth daily (Patient not taking: Reported on 3/16/2023), Disp: , Rfl:   •  Naltrexone-buPROPion HCl ER 8-90 MG TB12, Take 1 tablet once daily for 1 week, 1 tablet twice daily for the second week then 2 tablets twice daily after that (Patient not taking: Reported on 3/16/2023), Disp: 120 tablet, Rfl: 5  •  Prenatal Vit-Fe Fumarate-FA (PRENATAL VITAMIN PO), Take by mouth (Patient not taking: Reported on 5/5/2023), Disp: , Rfl:           • Whom besides the patient is providing clinical information about today's encounter?   o NO ADDITIONAL HISTORIAN (patient alone provided history)    Physical Exam and Assessment/Plan by Diagnosis:    Patient is here for full skin exam  Patient is concern she may have HS on right groin  Patient had it for 10 years but never had a name to it  SEBORRHEIC KERATOSES  - Relevant exam: Scattered over the trunk/extremities are waxy brown to black plaques and papules with stuck on appearance  - Exam and clinical history consistent with seborrheic keratoses  - Counseled that these are benign growths that do not require treatment  - Counseled that removal of lesions is considered cosmetic and so would incur a fee should patient elect to move forward  - Patient to hold on treatments for now but will inform us should they desire additional treatments    MELANOCYTIC NEVI  -Relevant exam: Scattered over the trunk/extremities are homogenously pigmented brown macules and papules  ELM performed and without concerning findings  - Exam and clinical history consistent with melanocytic nevi  - Educated on the ABCDE's of melanoma; handout provided  - Counseled to return to clinic prior to scheduled appointment should any of these lesions change or should any new lesions of concern arise  - Counseled on use of sun protection daily   Reviewed latest FDA sunscreen guidelines, including use of broad spectrum (UVA and UVB blocking) sunscreen or sun protective clothing with SPF 30-50 every 2-3 hours and reapplied after exposure to water; use of photoprotective clothing, including a broad brim hat and UPF rated clothing if outdoors for several hours; avoid use of tanning beds as these pose significant risk for melanoma and skin cancer  LENTIGINES  OTHER SKIN CHANGES DUE TO CHRONIC EXPOSURE TO NONIONIZING RADIATION  - Relevant exam: Over sun exposed areas are brown macules  ELM performed and without concerning findings  - Exam and clinical history consistent with lentigines  - Educated that these are indicative of prior sun exposure  - Counseled to return to clinic prior to scheduled appointment should any of these lesions change or should any new lesions of concern arise   - Recommended use of sunscreen as above and below  - Counseled on use of sun protection daily  Reviewed latest FDA sunscreen guidelines, including use of broad spectrum (UVA and UVB blocking) sunscreen or sun protective clothing with SPF 30-50 every 2-3 hours and reapplied after exposure to water; use of photoprotective clothing, including a broad brim hat and UPF rated clothing if outdoors for several hours; avoid use of tanning beds as these pose significant risk for melanoma and skin cancer  CHERRY ANGIOMAS  - Relevant exam: Scattered over the trunk/extremities are red papules  - Exam and clinical history consistent with cherry angiomas  - Educated that these are benign  - Educated that removal is considered aesthetic and would incur a fee  - Patient does not wish to pursue removal at this time but will contact us should this change      KERATOSIS PILARIS    Physical Exam:  • Anatomic Location Affected & Morphological Description:  bilateral upper arms with 5-1SH ellis-follicular pinkish-red slightly scaly papules       Additional History of Present Condition:  Present for years    Assessment and Plan:  - History and physical exam consistent with keratosis pilaris (KP)  - Educated that [address] is a common condition that often presents as tiny bumps on the skin that resemble goosebumps or small pimples  These bumps are most often seen on the upper arms and thighs but can be more extensive    - Educated the [address] is caused by abnormal skin cells lining the upper portion of the hair follicles and is strongly associated with genetics  - Educated that [address] is not dangerous but can be associated with eczema  - Educated that bumps often worsen during cool, dry months and that they can resolve or improve with age, but can flare with puberty and hormonal therapies  Based on a thorough discussion of this condition and the management approach to it (including a comprehensive discussion of the known risks, side effects and potential benefits of treatment), the patient (family) agrees to implement the following specific plan:  • Discussed over the counter creams containaining gentle acids  Including:   o Amlactin's line of lotions (lactic acid containing)   o Cerave SA (salicylic acid containing) lotion or cream  o Skin Fix Renewing cream  o KP Duty cream (see photo below)  Choose one of the above to start  Advise gentle skin care as follows: • Shower with lukewarm water less than 10 minutes   • Use Dove unscented soap to groin and armpits and neck  • Pat dry after shower  Do not harshly rub  • Immediately moisturize with heavy emollient   o BEST - OINTMENTS, such as Vaseline, Aquaphor, Cerave healing ointment      o BETTER - CREAMS, such as Cerave, Cetaphil, VaniCREAM, Aveeno, Eucerin  o AVOID LOTIONS, too thin, most things in pump  • Moisturize at least once daily, more often if you can  Can mix moisturizers with the gentle acid creams above or use on top of the acid creams above           HIDRADENITIS SUPPURATIVA    Physical Exam:  • Anatomic Location Affected:  Right groin  • Morphological Description:  Two active erythematous cysts with numerous background scars at sites of prior invovlement  • BECKFORD STAGE: 2 (moderate)  • Pertinent Positives:  • Pertinent Negatives: Additional History of Present Condition:    - History of past smoking: Yes - no longer smoking  - History of diabetes: Yes  ( pre-diabetic during pregnancy)  Pre-diabetic on last HbA1C check  - History of PCOS or PCOS associated symptoms (menstrual irregularities, hirsutism, hormonal acne): Yes, menstrual irregularities  - History of obesity: Yes   - History of IBD or IBD associated symptoms (frequent blood diarrhea, abdominal pain): No  - PHQ2  - Feeling down or depressed over the past two weeks: No  - Little interest in things over the past two weeks: No    Assessment and Plan:  • Educated patient with hidradenitis suppurativa is an inflammatory skin disease that affects apocrine gland-bearing skin, classically in the axillae, groin, and under the breasts  • Discussed that hidradenitis suppurative is characterized by recurrent boil-like nodules and abscesses than can be associated with discharge, difficult to heal open wounds (sinuses) and scarring  • Discussed that HS often affects people between 21-42 yo and is more common in patients with history of obesity, insulin resistance/metabolic syndrome, other follicular disorders, cigarette smoking, IBD  • Educated on treatment ladder of HS, including topical antibiotics/anti-inflammatories, metformin, oral antibiotics, isotretinoin, immunomodulating medications, procedures (excisions, de-benjamin, laser) including benefits and drawbacks    • Educated on lifestyle factors that can help with improving HS including smoking cessation (not relevant for this patient), weight loss  • Discussed that HS is frequently associated with depression and anxiety and that we need to work together to form a care team and support network for the patient  • Based on a thorough discussion of this condition and the management approach to it (including a comprehensive discussion of the known risks, side effects and potential benefits of treatment), the patient (family) agrees to implement the following specific plan:  •    • Given recent HbA1C in prediabetic range along with history of irregular periods and early FPHL with noted history of hirsutism suggestive of PCOS, discussed starting metformin 500 mg daily and increasing to TID as tolerated with patient's primary care physician, who is in favor of this plan  Therefore will start 500 mg BID (start with 1 pill daily for 1 month and increase to 2 pills daily if tolerated)  Will plan to increase to TID on next visit if tolerated (recent study demonstrating >65% of patients with improvement; PMID [id number])  Side effects including GI upset and recommendation to limit alcohol discussed  • Given history consistent with PCOS as above will start spironolactone 50 mg BID  Educated to start one pill and increase to twice daily if tolerated  S/E reviewed including menstrual irregularity, breast tenderness, headaches, GI upset, K+ retention, palpitations, teratogenicity/feminization of male fetus  Educated to limit high potassium foods like bananas, avocados and coconut water while on this medication  NO history of liver/kidney disease, no strong family history of breast cancer  o Of note, patient with plans for pregnancy in the next few years but not immediate plans and discussed at length that pregnancy on the above-listed medications would not be advisable due to teratogenicity/safety profile  Patient confirms no intention of becoming pregnant at this time  •   • Will initiate topical treatment as below in the interim:  o Start to use Clindamycin 1% lotion  Apply twice a day to all affected areas on the skin; Bilateral flanks and armpits  Apply this in the morning and evening    o Start to use over the counter Cetaphil gentle wash   (Below is a picture) Apply daily when showering to all affected areas on "the armpits, bilateral flanks, and anywhere that boils/cysts appear  •       NEOPLASM OF UNCERTAIN BEHAVIOR OF SKIN    Physical Exam:  • (Anatomic Location); (Size and Morphological Description); (Differential Diagnosis):  o Specimen A: Right Arm; 0 3 cm x 0 5 cm pink firm papule with areas of pigmentation rule out dermatofibroma   o Specimen B: Back; 0 6cm x 0 7 cm light to dark brown papule with irregular pigmentation rule out atypia  • Pertinent Positives:  • Pertinent Negatives: Additional History of Present Condition:  Present on exam    Assessment and Plan:  • I have discussed with the patient that a sample of skin via a \"skin biopsy” would be potentially helpful to further make a specific diagnosis under the microscope  • Based on a thorough discussion of this condition and the management approach to it (including a comprehensive discussion of the known risks, side effects and potential benefits of treatment), the patient (family) agrees to implement the following specific plan:    o Procedure:  Skin Biopsy  After a thorough discussion of treatment options and risk/benefits/alternatives (including but not limited to local pain, scarring, dyspigmentation, blistering, possible superinfection, and inability to confirm a diagnosis via histopathology), verbal and written consent were obtained and portion of the rash was biopsied for tissue sample  See below for consent that was obtained from patient and subsequent Procedure Note    PROCEDURE TANGENTIAL (SHAVE) BIOPSY NOTE:    • Performing Physician: Dr Bull  • Anatomic Location; Clinical Description with size (cm); Pre-Op Diagnosis:   o Specimen A: Right Arm; 0 3 cm x 0 5 cm pink firm papule with areas of pigmentation rule out dermatofibroma   o Specimen B: Back; 0 6cm x 0 7 cm light to dark brown papule with irregular pigmentation rule out atypia  • Post-op diagnosis: Same     • Local anesthesia: 1% Lidocaine HCL     • Topical anesthesia: " "None    • Hemostasis: Aluminum chloride       After obtaining informed consent  at which time there was a discussion about the purpose of biopsy  and low risks of infection and bleeding  The area was prepped and draped in the usual fashion  Anesthesia was obtained with 1% lidocaine with epinephrine  A shave biopsy to an appropriate sampling depth was obtained by Shave (Dermablade or 15 blade) The resulting wound was covered with surgical ointment and bandaged appropriately  The patient tolerated the procedure well without complications and was without signs of functional compromise  Specimen has been sent for review by Dermatopathology  Standard post-procedure care has been explained and has been included in written form within the patient's copy of Informed Consent  INFORMED CONSENT DISCUSSION AND POST-OPERATIVE INSTRUCTIONS FOR PATIENT    I   RATIONALE FOR PROCEDURE  I understand that a skin biopsy allows the Dermatologist to test a lesion or rash under the microscope to obtain a diagnosis  It usually involves numbing the area with numbing medication and removing a small piece of skin; sometimes the area will be closed with sutures  In this specific procedure, sutures are not usually needed  If any sutures are placed, then they are usually need to be removed in 2 weeks or less  I understand that my Dermatologist recommends that a skin \"shave\" biopsy be performed today  A local anesthetic, similar to the kind that a dentist uses when filling a cavity, will be injected with a very small needle into the skin area to be sampled  The injected skin and tissue underneath \"will go to sleep” and become numb so no pain should be felt afterwards  An instrument shaped like a tiny \"razor blade\" (shave biopsy instrument) will be used to cut a small piece of tissue and skin from the area so that a sample of tissue can be taken and examined more closely under the microscope    A slight amount of bleeding " "will occur, but it will be stopped with direct pressure and a pressure bandage and any other appropriate methods  I understands that a scar will form where the wound was created  Surgical ointment will be applied to help protect the wound  Sutures are not usually needed  II   RISKS AND POTENTIAL COMPLICATIONS   I understand the risks and potential complications of a skin biopsy include but are not limited to the following:  • Bleeding  • Infection  • Pain  • Scar/keloid  • Skin discoloration  • Incomplete Removal  • Recurrence  • Nerve Damage/Numbness/Loss of Function  • Allergic Reaction to Anesthesia  • Biopsies are diagnostic procedures and based on findings additional treatment or evaluation may be required  • Loss or destruction of specimen resulting in no additional findings    My Dermatologist has explained to me the nature of the condition, the nature of the procedure, and the benefits to be reasonably expected compared with alternative approaches  My Dermatologist has discussed the likelihood of major risks or complications of this procedure including the specific risks listed above, such as bleeding, infection, and scarring/keloid  I understand that a scar is expected after this procedure  I understand that my physician cannot predict if the scar will form a \"keloid,\" which extends beyond the borders of the wound that is created  A keloid is a thick, painful, and bumpy scar  A keloid can be difficult to treat, as it does not always respond well to therapy, which includes injecting cortisone directly into the keloid every few weeks  While this usually reduces the pain and size of the scar, it does not eliminate it  I understand that photographs may be taken before and after the procedure  These will be maintained as part of the medical providers confidential records and may not be made available to me    I further authorize the medical provider to use the photographs for teaching purposes " "or to illustrate scientific papers, books, or lectures if in his/her judgment, medical research, education, or science may benefit from its use  I have had an opportunity to fully inquire about the risks and benefits of this procedure and its alternatives  I have been given ample time and opportunity to ask questions and to seek a second opinion if I wished to do so  I acknowledge that there have specifically been no guarantees as to the cosmetic results from the procedure  I am aware that with any procedure there is always the possibility of an unexpected complication  III  POST-PROCEDURAL CARE (WHAT YOU WILL NEED TO DO \"AFTER THE BIOPSY\" TO OPTIMIZE HEALING)    • Keep the area clean and dry  Try NOT to remove the bandage or get it wet for the first 24 hours  • Gently clean the area and apply surgical ointment (such as Vaseline petrolatum ointment, which is available \"over the counter\" and not a prescription) to the biopsy site for up to 2 weeks straight  This acts to protect the wound from the outside world  • Sutures are not usually placed in this procedure  If any sutures were placed, return for suture removal as instructed (generally 1 week for the face, 2 weeks for the body)  • Take Acetaminophen (Tylenol) for discomfort, if no contraindications  Ibuprofen or aspirin could make bleeding worse  • Call our office immediately for signs of infection: fever, chills, increased redness, warmth, tenderness, discomfort/pain, or pus or foul smell coming from the wound  WHAT TO DO IF THERE IS ANY BLEEDING? If a small amount of bleeding is noticed, place a clean cloth over the area and apply firm pressure for ten minutes  Check the wound after 10 minutes of direct pressure  If bleeding persists, try one more time for an additional 10 minutes of direct pressure on the area    If the bleeding becomes heavier or does not stop after the second attempt, or if you have any other questions " about this procedure, then please call your [facility]'s Dermatologist by calling [phone] (SKIN)  I hereby acknowledge that I have reviewed and verified the site with my Dermatologist and have requested and authorized my Dermatologist to proceed with the procedure  Time: 30 mins spent with patient  15 minutes spent on documentation and coordination of care with primary care doctor for medication initiation        Scribe Attestation    I,:  Gregorio Jones am acting as a scribe while in the presence of the attending physician :       I,:  Fransico Aguilar MD personally performed the services described in this documentation    as scribed in my presence :

## 2023-05-18 NOTE — PATIENT INSTRUCTIONS
What is skin cancer? Skin cancer is unfortunately very common  That's why we are here to help you on your journey to healthy happy skin! There are two main types of skin cancer: melanoma and non-melanoma skin cancer  Melanoma is a form of skin cancer that often arises within an existing nevus or mole  However, this is not always the case  Melanoma can arise anywhere (not only where you have moles right now)  Melanoma can run in families, so letting us know about your family history is important  Non-melanoma skin cancer is the most common type of cancer in the [place]  The two main types of non-melanoma skin cancers are basal cell carcinomas (BCC) and squamous cell carcinoma (SCC)  These cancers tend to be less aggressive than melanomas but are still important to look for and treat  What can I do to prevent skin cancer? One of the largest risk factors for skin cancer is sun exposure or UV radiation  Therefore, sun protection is sinclair! Here are some great tips for protecting yourself! Try to avoid direct sun exposure during peak sun hours (10 AM to 2 PM)  Remember you get A LOT of sun even under cloud coverage and through care windows! When choosing a sunscreen, look for one that says “broad spectrum” sunscreen  This means it protects you from more of the harmful UV rays  Choose a sunscreen that is SPF 30 or greater for best protection  Apply sunscreen to all sun-exposed skin and reapply every 2 hours  Consider sun protective clothing! Great additions to your sun protective clothing wardrobe include broad brimmed hats, sunglasses, UPF clothing  Avoid tanning salons  These have been shown to be very harmful in terms of your risk of skin cancer  Avoid “base tans”  We now know that tans are dangerous (not just sun burns)  If you want to have a tan for a trip, consider a spray tan! Should I check my skin at home between my dermatology appointments? Yes!  It's always a great idea to look at your "skin on a regular basis  Here are some things to look for when monitoring your skin  For melanoma, look for the ABCDE's! A = Asymmetry  Look for a spot where one half does not match the other! B = Borders  Look for a spot that has jagged, ragged or irregular borders  C = Color  Look for a spot that is not evenly colored and often includes multiple colors, especially true black, red, white, blue, grey  D = Diameter  Look for a spot that is larger than the size of a pencil eraser  E = Evolution  If you ever have a spot that is changing in shape, color, size or symptoms (becomes itchy, painful or starts to bleed), always call us! For non-melanoma skin cancers, look for a new, pink spot that is not going away, especially one that is itchy, painful or bleeding  What should I do if I see a spot that is concerning for melanoma or non-melanoma skin cancer? If you are ever concerned, call us! Do not wait for your next appointment  We want to help! The nature of sun-induced photo-aging and skin cancers is discussed  Sun avoidance, protective clothing, and the use of 30-SPF sunscreens is advised  Observe closely for skin damage/changes, and call if such occurs  Use a moisturizer + sunscreen \"combo\" product such as Neutrogena Daily Defense SPF 50+ or CeraVe AM at least three times a day  DERMATOFIBROMA  Assessment and Plan:  - History and physical consistent with dermatofibroma  - Educated that these lesions are generally benign but there are very rare subtypes that can spread to other parts of the body  - No evidence of eruptive dermatofibromas (not >15 lesions with acute onset)  - Educated the efforts to treat lesions with cryotherapy, shave biopsy and laser surgery are rarely completely successful    Based on a thorough discussion of this condition and the management approach to it (including a comprehensive discussion of the known risks, side effects and potential benefits of treatment), the patient " (family) agrees to implement the following specific plan:  Educated patient to call clinic if lesion changes (enlarges, ulcerates)     KERATOSIS PILARIS  Assessment and Plan:  - History and physical exam consistent with keratosis pilaris (KP)  - Educated that [place] is a common condition that often presents as tiny bumps on the skin that resemble goosebumps or small pimples  These bumps are most often seen on the upper arms and thighs but can be more extensive    - Educated the [place] is caused by abnormal skin cells lining the upper portion of the hair follicles and is strongly associated with genetics  - Educated that [place] is not dangerous but can be associated with eczema  - Educated that bumps often worsen during cool, dry months and that they can resolve or improve with age, but can flare with puberty and hormonal therapies  Based on a thorough discussion of this condition and the management approach to it (including a comprehensive discussion of the known risks, side effects and potential benefits of treatment), the patient (family) agrees to implement the following specific plan:  Discussed over the counter creams containaining gentle acids  Including:   Amlactin's line of lotions (lactic acid containing)   Cerave SA (salicylic acid containing) lotion or cream  Skin Fix Renewing cream  KP Duty cream (see photo below)  Choose one of the above to start  Advise gentle skin care as follows: Shower with lukewarm water less than 10 minutes   Use Dove unscented soap to groin and armpits and neck  Pat dry after shower  Do not harshly rub  Immediately moisturize with heavy emollient   BEST - OINTMENTS, such as Vaseline, Aquaphor, Cerave healing ointment      BETTER - CREAMS, such as Cerave, Cetaphil, VaniCREAM, Aveeno, Eucerin  AVOID LOTIONS, too thin, most things in pump  Moisturize at least once daily, more often if you can   Can mix moisturizers with the gentle acid creams above or use on top of the acid creams above  HIDRADENITIS SUPPURATIVA  Assessment and Plan:  Educated patient with hidradenitis suppurativa is an inflammatory skin disease that affects apocrine gland-bearing skin, classically in the axillae, groin, and under the breasts  Discussed that hidradenitis suppurative is characterized by recurrent boil-like nodules and abscesses than can be associated with discharge, difficult to heal open wounds (sinuses) and scarring  Discussed that HS often affects people between 21-44 yo and is more common in patients with history of obesity, insulin resistance/metabolic syndrome, other follicular disorders, cigarette smoking, IBD  Educated on treatment ladder of HS, including topical antibiotics/anti-inflammatories, metformin, oral antibiotics, isotretinoin, immunomodulating medications, procedures (excisions, de-benjamin, laser) including benefits and drawbacks  Educated on lifestyle factors that can help with improving HS including smoking cessation (not relevant for this patient), weight loss  Discussed that HS is frequently associated with depression and anxiety and that we need to work together to form a care team and support network for the patient  Based on a thorough discussion of this condition and the management approach to it (including a comprehensive discussion of the known risks, side effects and potential benefits of treatment), the patient (family) agrees to implement the following specific plan:  Given recent diagnosis of diabetes, will plan to touch base with patient's primary care provider regarding initiation of metformin (recent study demonstrating >65% of patients with improvement; PMID [id number])  Would favor starting 500 mg BID and increasing to TID as tolerated  Discuss Doxycycline 100 mg BID   Discuss Clindamycin/rifampin - 300 mg BID for both medications for 12 weeks   Side effects of medications including change in color of body fluids, cutaneous reactions, diarrhea/GI upset/C diff were discussed  Will initiate topical treatment as below in the interim:  Start to use Clindamycin 1% lotion  Apply twice a day to all affected areas on the skin; Bilateral flanks and armpits  Apply this in the morning and evening  Start to use over the counter Cetaphil gentle wash  (Below is a picture) Apply daily when showering to all affected areas on the armpits, bilateral flanks, and anywhere that boils/cysts appear

## 2023-05-19 ENCOUNTER — TELEPHONE (OUTPATIENT)
Dept: FAMILY MEDICINE CLINIC | Facility: CLINIC | Age: 38
End: 2023-05-19

## 2023-05-19 NOTE — TELEPHONE ENCOUNTER
Was speaking to Dr Arleth Napier from [place] [address] regarding a old pathology on patient and  They mentioned that Dr Dominic Martins put in his note that she  wanted to touch base with PCP regarding initiation of Met formin you can see the note on the office visit from yesterday   Their phone number is [phone]

## 2023-05-22 ENCOUNTER — TELEPHONE (OUTPATIENT)
Dept: FAMILY MEDICINE CLINIC | Facility: CLINIC | Age: 38
End: 2023-05-22

## 2023-05-22 DIAGNOSIS — E78.5 DYSLIPIDEMIA: ICD-10-CM

## 2023-05-22 DIAGNOSIS — J45.41 MODERATE PERSISTENT ASTHMA WITH ACUTE EXACERBATION: ICD-10-CM

## 2023-05-22 DIAGNOSIS — E66.01 MORBID OBESITY WITH BMI OF 40.0-44.9, ADULT (HCC): ICD-10-CM

## 2023-05-22 DIAGNOSIS — R73.9 HYPERGLYCEMIA: Primary | ICD-10-CM

## 2023-05-22 NOTE — TELEPHONE ENCOUNTER
Please have patient schedule follow-up for her hyperglycemia and dyslipidemia in June  I am placing blood work orders in computer please have her complete them about 1 week before office visit    Patient's dermatologist will be putting her on metformin for her hidradenitis suppurativa

## 2023-05-24 ENCOUNTER — HOSPITAL ENCOUNTER (OUTPATIENT)
Dept: MAMMOGRAPHY | Facility: CLINIC | Age: 38
Discharge: HOME | End: 2023-05-24

## 2023-05-24 ENCOUNTER — HOSPITAL ENCOUNTER (OUTPATIENT)
Dept: ULTRASOUND IMAGING | Facility: CLINIC | Age: 38
Discharge: HOME | End: 2023-05-24

## 2023-05-24 DIAGNOSIS — N64.4 BREAST PAIN, LEFT: ICD-10-CM

## 2023-05-31 DIAGNOSIS — R92.8 ABNORMAL MAMMOGRAM: Primary | ICD-10-CM

## 2023-06-03 NOTE — PROGRESS NOTES
GBS =Positive
Problem List Items Addressed This Visit     Encounter for supervision of normal first pregnancy in third trimester - Primary     Feels well overall, ready to deliver  Good fetal movement, it's a boy - maybe Harmeet  GBS positive  Last growth scan at 36 weeks showed EFW at 8 lbs - no plan for further growth scans         Gestational diabetes mellitus (GDM)     Says she is checking her glucose values QID but is bad about turning them in  Says they are all "within range"  Going for weekly APFS
03-Jun-2023 10:48

## 2023-06-05 PROCEDURE — 88342 IMHCHEM/IMCYTCHM 1ST ANTB: CPT | Performed by: STUDENT IN AN ORGANIZED HEALTH CARE EDUCATION/TRAINING PROGRAM

## 2023-06-05 PROCEDURE — 88305 TISSUE EXAM BY PATHOLOGIST: CPT | Performed by: STUDENT IN AN ORGANIZED HEALTH CARE EDUCATION/TRAINING PROGRAM

## 2023-06-05 PROCEDURE — 88341 IMHCHEM/IMCYTCHM EA ADD ANTB: CPT | Performed by: STUDENT IN AN ORGANIZED HEALTH CARE EDUCATION/TRAINING PROGRAM

## 2023-06-06 ENCOUNTER — TELEPHONE (OUTPATIENT)
Dept: DERMATOLOGY | Facility: CLINIC | Age: 38
End: 2023-06-06

## 2023-06-06 NOTE — TELEPHONE ENCOUNTER
I have left a message for the patient to call back in regards to her pathology results to schedule her for 2 excisions per Dr Corrine Cameron

## 2023-06-06 NOTE — LETTER
[facility] [place]  [id number] [facility] [address]  Phone#  [phone]  Fax#  [phone]      Tiny 15, 2023      Robyn Porras    We have been trying to contact you regarding your recent pathology results and to schedule an appointment  Please contact our office at [phone]       Sincerely,      Dr Sagrario Thomas

## 2023-06-06 NOTE — TELEPHONE ENCOUNTER
----- Message from Shannan Zheng MD sent at 6/5/2023  3:04 PM EDT -----  DERMATOPATHOLOGY RESULT NOTE    Results reviewed by ordering physician  Called patient to personally discuss results  No answer, left voicemail with result  Confirmed OK to leave results on VM  Instructions for Clinical Derm Team:   (remember to route Result Note to appropriate staff):    Call patient and schedule for EXCISION of both spots  Please  who you plan to schedule with to confirm if they would prefer both spots one day or separate days  If scheduled with me, please schedule two separate procedures  Result & Plan by Specimen:    Specimen A: indeterminate  Plan: excision      Specimen B: indeterminate  Plan: excision      Case Report  Surgical Pathology Report                         Case: [id number]                                   Authorizing Provider: Shannan Zheng MD Collected:           05/18/2023 1714              Ordering Location:     [facility]      Received:            05/18/2023 1714                                     [facility]                                                                Pathologist:           Mariaelena Oseguera MD                                                           Specimens:   A) - Skin, Other, Specimen A: Right Arm                                                             B) - Skin, Other, Specimen B: Back                                                       Final Diagnosis  A  Skin, right upper arm, shave biopsy:     Atypical dermal spitzoid melanocytic proliferation; extending focally to the tissue edge (see note)      Note:  The histopathologic findings are compatible with DERMAL SPITZ NEVUS  Excision is recommended to ensure against local persistence/recurrence  The lesional cells are positive for SOX10 and MART-1, and they are negative for HMB45  Expression of p16 is retained  [id number] expression is not present   Only focal, weak expression of PRAME is seen  Fluorescence in situ hybridization (FISH) analysis performed at [facility] using a 4-probe panel (6p25, 9p21/CEN9, 8q24, and 11q13) detected FISH signals within normal limits (accession #: [id number])  The results of TERT promoter mutational analysis will be reported as an addendum               B  Skin, back, shave biopsy:     Lentiginous compound dysplastic nevus with moderate to focally severe cytologic atypia; extending to the tissue edges (see note)      Note: SOX10, HMB45, and MART-1 immunostains were performed and reviewed          Electronically signed by Kerline Sultana MD on 6/5/2023 at 10:10 AM      Comments: This is an appended report  These results have been appended to a previously preliminary verified report

## 2023-06-15 ENCOUNTER — TELEPHONE (OUTPATIENT)
Dept: DERMATOLOGY | Facility: CLINIC | Age: 38
End: 2023-06-15

## 2023-06-30 NOTE — PROGRESS NOTES
Mecca Curiel  1985      CC:  Yearly exam    S:  41 y o  female here for yearly exam    LMP is 3/15/23    She is having irregular cycles and would like to discuss possible PCOS  Cycles can be every 34 days, lasting 5 days, irregular sometimes heavy flow with severe cramping  She denies vaginal discharge, itching, pelvic pain  She has no urinary concerns, does not have incontinence  No bowel concerns  No breast concerns  Sexual activity: She is sexually active without pain, bleeding or dryness  She is  and monogamous  She is not interested in STD screening today  Contraception: None (Withdrawal)    Last Pap: 9/24/20 - NILM, Neg HPV    We reviewed ASCCP guidelines for Pap testing today  Family hx of breast cancer: no  Family hx of ovarian cancer: no  Family hx of colon cancer: no      Current Outpatient Medications:   •  albuterol (Ventolin HFA) 90 mcg/act inhaler, Inhale 2 puffs every 4 (four) hours as needed for wheezing or shortness of breath, Disp: 18 g, Rfl: 2  •  Prenatal Vit-Fe Fumarate-FA (PRENATAL VITAMIN PO), Take by mouth (Patient not taking: Reported on 5/5/2023), Disp: , Rfl:   •  buPROPion (WELLBUTRIN XL) 150 mg 24 hr tablet, Take 150 mg by mouth daily (Patient not taking: Reported on 3/16/2023), Disp: , Rfl:   •  clindamycin (CLEOCIN T) 1 % lotion, Apply topically 2 (two) times a day, Disp: 60 mL, Rfl: 3  •  metFORMIN (GLUCOPHAGE) 500 mg tablet, Take one pill for one month  If tolerating well, increase to two pills daily  Limit alcohol while on this medication  , Disp: 60 tablet, Rfl: 4  •  Naltrexone-buPROPion HCl ER 8-90 MG TB12, Take 1 tablet once daily for 1 week, 1 tablet twice daily for the second week then 2 tablets twice daily after that (Patient not taking: Reported on 3/16/2023), Disp: 120 tablet, Rfl: 5  •  spironolactone (ALDACTONE) 50 mg tablet, Take 1 pill (50 mg) once daily for two weeks  Take with large glass of water   If well-tolerated, increase "to 2 pills (100 mg) daily  Avoid large amounts of high potassium foods while on this medication  , Disp: 60 tablet, Rfl: 3  Patient Active Problem List   Diagnosis   • Allergic rhinitis   • Moderate persistent asthma   • Fatigue   • Migraine without status migrainosus, not intractable   • Seasonal affective disorder (HCC)   • Hyperglycemia   • Dyslipidemia   • Morbid obesity with BMI of 40 0-44 9, adult [facility])     Past Medical History:   Diagnosis Date   • Asthma     had vaccine    • Diabetes mellitus ([facility] [address] )     gestational DM diet controlled   • 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    only got  sperm tested, naturally conceived    • Lumbar strain 7/16/2015   • Varicella     chicken pox as child   • Visual impairment     eyewear      Family History   Problem Relation Age of Onset   • Multiple sclerosis Mother    • Skin cancer Mother    • Cancer Mother    • Other Mother         Myocardial bridge   • Diabetes Father    • Other Father         Pericarditis   • Miscarriages / Stillbirths Sister    • Asthma Maternal Grandmother    • Diabetes Paternal Grandmother    • No Known Problems Son    • Learning disabilities Other    • No Known Problems Paternal Aunt    • No Known Problems Maternal Aunt    • Breast cancer Neg Hx    • Colon cancer Neg Hx    • Ovarian cancer Neg Hx    • Uterine cancer Neg Hx    • Cervical cancer Neg Hx           Review of Systems   Respiratory: Negative  Cardiovascular: Negative  Gastrointestinal: Negative for constipation and diarrhea  O:  Blood pressure 104/84, height 5' 7\" (1 702 m), weight 129 kg (284 lb 6 4 oz), last menstrual period 03/15/2023, not currently breastfeeding  Patient appears well and is not in distress  Breasts are symmetrical without mass, tenderness, nipple discharge, skin changes or adenopathy  Abdomen is soft and nontender without masses     External genitals are normal without lesions or rashes , + hidradenitis suppurativa  Urethral meatus and urethra are normal  Bladder is " normal to palpation  Vagina is normal without discharge or bleeding  Cervix is normal without discharge or lesion  Uterus is normal, mobile, nontender without palpable mass  Adnexa are normal, nontender, without palpable mass  A:  Yearly exam      P:   Pap & HPV up to date   Will check pelvic US to eval bleeding      RTO one year for yearly exam or sooner as needed

## 2023-09-28 ENCOUNTER — OFFICE VISIT (OUTPATIENT)
Dept: DERMATOLOGY | Facility: CLINIC | Age: 38
End: 2023-09-28

## 2023-09-28 VITALS — TEMPERATURE: 98.4 F | BODY MASS INDEX: 43.37 KG/M2 | WEIGHT: 276.3 LBS | HEIGHT: 67 IN

## 2023-09-28 DIAGNOSIS — L73.2 HIDRADENITIS SUPPURATIVA: Primary | ICD-10-CM

## 2023-09-28 DIAGNOSIS — D17.22 LIPOMA OF LEFT UPPER EXTREMITY: ICD-10-CM

## 2023-09-28 DIAGNOSIS — D48.5 NEOPLASM OF UNCERTAIN BEHAVIOR OF SKIN: ICD-10-CM

## 2023-09-28 RX ORDER — SPIRONOLACTONE 50 MG/1
TABLET, FILM COATED ORAL
Qty: 60 TABLET | Refills: 3 | Status: SHIPPED | OUTPATIENT
Start: 2023-09-28

## 2023-09-28 NOTE — PATIENT INSTRUCTIONS
HIDRADENITIS SUPPURATIVA    Assessment and Plan:  Based on a thorough discussion of this condition and the management approach to it (including a comprehensive discussion of the known risks, side effects and potential benefits of treatment), the patient (family) agrees to implement the following specific plan:  Continue taking Spironolactone 50 mg twice daily. If you decide to become pregnant, stop using and notify our office. Continue using Cetaphil wash for showering. Continue using Clindamycin 1% lotion as needed for outbreaks. Follow up in one year, sooner if symptoms worsen. What is hidradenitis suppurativa? Hidradenitis suppurativa is an inflammatory skin disease that affects apocrine gland-bearing skin in the axillae, in the groin, and under the breasts. It is characterised by recurrent boil-like nodules and abscesses that culminate in pus-like discharge, difficult-to-heal open wounds (sinuses) and scarring. Hidradenitis suppurativa also has significant psychological impact and many patients suffer from impairment of body image, depression and anxiety. The term hidradenitis implies it starts as an inflammatory disorder of sweat glands, which is now known to be incorrect. Hidradenitis suppurativa is also known as acne inversa. Who gets hidradenitis suppurativa? Hidradenitis often starts at puberty, and is most active between the ages of 21 and 36 years, and in women, can resolve at menopause. It is three times more common in females than in males. Risk factors include: Other family members with hidradenitis suppurativa  Obesity and insulin resistance/metabolic syndrome  Cigarette smoking  Follicular occlusion disorders: acne conglobata, dissecting cellulitis, pilonidal sinus  Inflammatory bowel disease (Crohn disease)  Rare autoinflammatory syndromes associated with abnormalities of PSTPIP1 gene. *    * PAPA syndrome (pyogenic arthritis, pyoderma gangrenosum and acne), PASH syndrome (pyoderma gangrenosum, acne, suppurative hidradenitis) and PAPASH syndrome (pyogenic arthritis, pyoderma gangrenosum, acne, suppurative hidradenitis). What causes hidradenitis suppurativa? Hidradenitis suppurativa is an autoinflammatory disorder. Although the exact cause is not yet understood, contributing factors include:  Friction from clothing and body folds  Aberrant immune response to commensal bacteria  Abnormal cutaneous or follicular microbiome  Follicular occlusion  Release of pro-inflammatory cytokines  Inflammation causing rupture of the follicular wall and destroying apocrine glands and ducts  Secondary bacterial infection  Certain drugs. What are the clinical features of hidradenitis suppurativa? Hidradenitis can affect a single or multiple areas in the armpits, neck, sub mammary area, and inner thighs. Anogenital involvement most commonly affects the groin, mons pubis, vulva (in females), sides of the scrotum (in males), perineum, buttocks and perianal folds. Signs include:  Open and closed comedones  Painful firm papules, larger nodules and pleated ridges  Pustules, fluctuant pseudocysts and abscesses  Pyogenic granulomas  Draining sinuses linking inflammatory lesions  Hypertrophic and atrophic scars. Many patients with hidradenitis suppurativa also suffer from other skin disorders, including acne, hirsutism and psoriasis. The severity and extent of hidradenitis suppurativa is recorded at assessment and when determining the impact of a treatment. The [place] system describes three distinct clinical stages:  Solitary or multiple, isolated abscess formation without scarring or sinus tracts  Recurrent abscesses, single or multiple widely  lesions, with sinus tract formation  Diffuse or broad involvement, with multiple interconnected sinus tracts and abscesses.     Severe hidradenitis ([place] Stage 3) has been associated with:  Male sex  Axillary and perianal involvement  Obesity  Smoking  Higher risk of stroke, coronary artery disease, heart failure, and peripheral artery disease  Disease duration. What is the treatment for hidradenitis suppurativa? General measures  Weight loss; follow an anti-inflammatory, low-sugar, low-grain, low-dairy diet (mainly plants)  Smoking cessation: this can lead to improvement within a few months  Loose fitting clothing  Daily unfragranced antiperspirants  If prone to secondary infection, wash with antiseptics or take bleach baths  Apply hydrogen peroxide solution or medical grade honey to reduce malodour  Use peeling agents such as resorcinol 15% cream to de-roof nodules  Apply simple dressings to draining sinuses  Analgesics, such as paracetamol (acetaminophen), for pain control  Seek help to manage anxiety and depression. Medical management of hidradenitis suppurativa  Medical management of hidradenitis suppurativa is difficult. Treatment is required long term. Effective options are listed below. Antibiotics  Topical clindamycin, with benzoyl peroxide to reduce bacterial resistance  Short course of oral antibiotics for acute staphylococcal abscesses, eg flucloxacillin  Prolonged courses (minimum 3 months) of tetracycline, metronidazole, trimethoprim + sulphamethoxazole, fluoroquinolones, ertapenem or dapsone for their anti-inflammatory action  6-12 week courses of the combination of clindamycin (or doxycycline) and rifampicin for severe disease. Antiandrogens  Long-term oral contraceptive pill; antiandrogenic progesterones drospirenone or cyproterone acetate may be more effective than standard combined pills. These are more suitable than progesterone-only pills or devices. Spironolactone and finasteride  Response takes 6 months or longer.     Immunomodulatory treatments for severe disease  Intralesional corticosteroids into nodules  Systemic corticosteroids short-term for flares  Methotrexate, ciclosporin, and azathioprine  TNF-? inhibitors adalimumab and infliximab, used in higher dose than required for psoriasis, are the most successful treatments to date. Note that paradoxically, they may sometimes induce new-onset hidradenitis suppurativa  Other biologics are under investigation, such as the IL-1? antagonist, canakinumab    Other medical treatments  Metformin in patients with insulin resistance  Acitretin (unsuitable for females of childbearing potential)  Isotretinoin -- effective for acne but appears unhelpful for most cases of hidradenitis  Colchicine  Medical management of anxiety and depression    Surgical management of hidradenitis suppurativa  Incision and drainage of acute abscesses  Curettage and deroofing of nodules, abscesses and sinuses  Laser ablation of nodules, abscesses and sinuses  Wide local excision of persistent nodules  Radical excisional surgery of entire affected area  Nd:YAG laser hair removal     Discussed results of biopsied performed in May. Explained excision process. Excision scheduled for 12/21/23 at 8:45 with Dr. Linda Gonzalez in [facility]. LIPOMA  Physical Exam:  Anatomic Location Affected:  Left upper arm  Morphological Description:  10 cm mobile papule  Pertinent Positives:  Pertinent Negatives: Additional History of Present Condition:  N/A    Assessment and Plan:  Based on a thorough discussion of this condition and the management approach to it (including a comprehensive discussion of the known risks, side effects and potential benefits of treatment), the patient (family) agrees to implement the following specific plan: Will put in referral to general surgery for removal due to size. NEOPLASM OF UNCERTAIN BEHAVIOR OF SKIN      Assessment and Plan:  I have discussed with the patient that a sample of skin via a "skin biopsy” would be potentially helpful to further make a specific diagnosis under the microscope.   Based on a thorough discussion of this condition and the management approach to it (including a comprehensive discussion of the known risks, side effects and potential benefits of treatment), the patient (family) agrees to implement the following specific plan:    Procedure:  Skin Biopsy. After a thorough discussion of treatment options and risk/benefits/alternatives (including but not limited to local pain, scarring, dyspigmentation, blistering, possible superinfection, and inability to confirm a diagnosis via histopathology), verbal and written consent were obtained and portion of the rash was biopsied for tissue sample. See below for consent that was obtained from patient and subsequent Procedure Note. PROCEDURE TANGENTIAL (SHAVE) BIOPSY NOTE:    Performing Physician: Jennie Holden  Anatomic Location; Clinical Description with size (cm); Pre-Op Diagnosis:   Left upper arm; 1.0 cm irregularly pigmented papule. Differential diagnosis: benign vs atypical nevus; rule out melanoma   Post-op diagnosis: Same     Local anesthesia: 1% xylocaine with epi      Topical anesthesia: None    Hemostasis: Aluminum chloride       After obtaining informed consent  at which time there was a discussion about the purpose of biopsy  and low risks of infection and bleeding. The area was prepped and draped in the usual fashion. Anesthesia was obtained with 1% lidocaine with epinephrine. A shave biopsy to an appropriate sampling depth was obtained by Shave (Dermablade or 15 blade) The resulting wound was covered with surgical ointment and bandaged appropriately. The patient tolerated the procedure well without complications and was without signs of functional compromise. Specimen has been sent for review by Dermatopathology. Standard post-procedure care has been explained and has been included in written form within the patient's copy of Informed Consent.     INFORMED CONSENT DISCUSSION AND POST-OPERATIVE INSTRUCTIONS FOR PATIENT    I.  RATIONALE FOR PROCEDURE  I understand that a skin biopsy allows the Dermatologist to test a lesion or rash under the microscope to obtain a diagnosis. It usually involves numbing the area with numbing medication and removing a small piece of skin; sometimes the area will be closed with sutures. In this specific procedure, sutures are not usually needed. If any sutures are placed, then they are usually need to be removed in 2 weeks or less. I understand that my Dermatologist recommends that a skin "shave" biopsy be performed today. A local anesthetic, similar to the kind that a dentist uses when filling a cavity, will be injected with a very small needle into the skin area to be sampled. The injected skin and tissue underneath "will go to sleep” and become numb so no pain should be felt afterwards. An instrument shaped like a tiny "razor blade" (shave biopsy instrument) will be used to cut a small piece of tissue and skin from the area so that a sample of tissue can be taken and examined more closely under the microscope. A slight amount of bleeding will occur, but it will be stopped with direct pressure and a pressure bandage and any other appropriate methods. I understands that a scar will form where the wound was created. Surgical ointment will be applied to help protect the wound. Sutures are not usually needed.     II.  RISKS AND POTENTIAL COMPLICATIONS   I understand the risks and potential complications of a skin biopsy include but are not limited to the following:  Bleeding  Infection  Pain  Scar/keloid  Skin discoloration  Incomplete Removal  Recurrence  Nerve Damage/Numbness/Loss of Function  Allergic Reaction to Anesthesia  Biopsies are diagnostic procedures and based on findings additional treatment or evaluation may be required  Loss or destruction of specimen resulting in no additional findings    My Dermatologist has explained to me the nature of the condition, the nature of the procedure, and the benefits to be reasonably expected compared with alternative approaches. My Dermatologist has discussed the likelihood of major risks or complications of this procedure including the specific risks listed above, such as bleeding, infection, and scarring/keloid. I understand that a scar is expected after this procedure. I understand that my physician cannot predict if the scar will form a "keloid," which extends beyond the borders of the wound that is created. A keloid is a thick, painful, and bumpy scar. A keloid can be difficult to treat, as it does not always respond well to therapy, which includes injecting cortisone directly into the keloid every few weeks. While this usually reduces the pain and size of the scar, it does not eliminate it. I understand that photographs may be taken before and after the procedure. These will be maintained as part of the medical providers confidential records and may not be made available to me. I further authorize the medical provider to use the photographs for teaching purposes or to illustrate scientific papers, books, or lectures if in his/her judgment, medical research, education, or science may benefit from its use. I have had an opportunity to fully inquire about the risks and benefits of this procedure and its alternatives. I have been given ample time and opportunity to ask questions and to seek a second opinion if I wished to do so. I acknowledge that there have specifically been no guarantees as to the cosmetic results from the procedure. I am aware that with any procedure there is always the possibility of an unexpected complication. III. POST-PROCEDURAL CARE (WHAT YOU WILL NEED TO DO "AFTER THE BIOPSY" TO OPTIMIZE HEALING)    Keep the area clean and dry. Try NOT to remove the bandage or get it wet for the first 24 hours.     Gently clean the area and apply surgical ointment (such as Vaseline petrolatum ointment, which is available "over the counter" and not a prescription) to the biopsy site for up to 2 weeks straight. This acts to protect the wound from the outside world. Sutures are not usually placed in this procedure. If any sutures were placed, return for suture removal as instructed (generally 1 week for the face, 2 weeks for the body). Take Acetaminophen (Tylenol) for discomfort, if no contraindications. Ibuprofen or aspirin could make bleeding worse. Call our office immediately for signs of infection: fever, chills, increased redness, warmth, tenderness, discomfort/pain, or pus or foul smell coming from the wound. WHAT TO DO IF THERE IS ANY BLEEDING? If a small amount of bleeding is noticed, place a clean cloth over the area and apply firm pressure for ten minutes. Check the wound after 10 minutes of direct pressure. If bleeding persists, try one more time for an additional 10 minutes of direct pressure on the area. If the bleeding becomes heavier or does not stop after the second attempt, or if you have any other questions about this procedure, then please call your [facility] by calling [phone] (SKIN). I hereby acknowledge that I have reviewed and verified the site with my Dermatologist and have requested and authorized my Dermatologist to proceed with the procedure.

## 2023-09-28 NOTE — PROGRESS NOTES
[facility] Note     Patient Name: Junie Amanda  Encounter Date: 09/28/2023     Have you been cared for by a [facility] in the last 3 years and, if so, which description applies to you? Yes. I have been here within the last 3 years, and my medical history has NOT changed since that time. I am FEMALE/of child-bearing potential.    REVIEW OF SYSTEMS:  Have you recently had or currently have any of the following? · No changes in my recent health. PAST MEDICAL HISTORY:  Have you personally ever had or currently have any of the following? If "YES," then please provide more detail. · No changes in my medical history. FAMILY HISTORY:  Any "first degree relatives" (parent, brother, sister, or child) with the following? • No changes in my family's known health. PATIENT EXPERIENCE:    • Do you want the Dermatologist to perform a COMPLETE skin exam today including a clinical examination under the "bra and underwear" areas? NO  • If necessary, do we have your permission to call and leave a detailed message on your Preferred Phone number that includes your specific medical information? Yes      Allergies   Allergen Reactions   • Sulfa Antibiotics Other (See Comments)     Pt does not know what happens when she takes was a child when had the allergy      Current Outpatient Medications:   •  albuterol (Ventolin HFA) 90 mcg/act inhaler, Inhale 2 puffs every 4 (four) hours as needed for wheezing or shortness of breath, Disp: 18 g, Rfl: 2  •  clindamycin (CLEOCIN T) 1 % lotion, Apply topically 2 (two) times a day, Disp: 60 mL, Rfl: 3  •  metFORMIN (GLUCOPHAGE) 500 mg tablet, Take one pill for one month. If tolerating well, increase to two pills daily. Limit alcohol while on this medication. , Disp: 60 tablet, Rfl: 4  •  spironolactone (ALDACTONE) 50 mg tablet, Take 1 pill (50 mg) once daily for two weeks. Take with large glass of water.  If well-tolerated, increase to 2 pills (100 mg) daily. Avoid large amounts of high potassium foods while on this medication. , Disp: 60 tablet, Rfl: 3  •  Prenatal Vit-Fe Fumarate-FA (PRENATAL VITAMIN PO), Take by mouth (Patient not taking: Reported on 5/5/2023), Disp: , Rfl:           • Whom besides the patient is providing clinical information about today's encounter?   o NO ADDITIONAL HISTORIAN (patient alone provided history)    Physical Exam and Assessment/Plan by Diagnosis:      HIDRADENITIS SUPPURATIVA- [place] STAGE 2    Physical Exam:  • Anatomic Location Affected:  Groin  • Morphological Description:  Currently clear  • Pertinent Positives:  • Pertinent Negatives: Additional History of Present Condition:  Patient seen 5/18 by Dr Dirk Chu and was started on spironolactone 50 mg BID at last appointment due to suspicions of PCOS diagnosis, FPHL, and irregular periods. Patient also started on metformin and taking 1000 mg qhs. Patient is washing with Cetaphil wash and applying Clindamycin 1% lotion twice daily. Patient reports no side effects from medication. Patient reports she has been clear since starting spironolactone. She denies any side effects from spironolactone. She is pre-diabetic based on last A1C. She is not smoking (past smoker)    Assessment and Plan:  Based on a thorough discussion of this condition and the management approach to it (including a comprehensive discussion of the known risks, side effects and potential benefits of treatment), the patient (family) agrees to implement the following specific plan:  • Continue taking Spironolactone 50 mg twice daily. If you decide to become pregnant, stop using and notify our office. • NO history of liver/kidney disease, no strong family history of breast cancer. Complete counseling performed at last visit with Dr Dirk Chu for spironolactone  • Patient counseled on need for pregnancy prevention practices while on spironolactone due to risk of feminization and birth defects.  She is not currently on birth control. Stop immediately if inadvertently becomes pregnant  • Continue using Cetaphil wash for showering. • Continue using Clindamycin 1% lotion as needed for outbreaks. •   What is hidradenitis suppurativa? Hidradenitis suppurativa is an inflammatory skin disease that affects apocrine gland-bearing skin in the axillae, in the groin, and under the breasts. It is characterised by recurrent boil-like nodules and abscesses that culminate in pus-like discharge, difficult-to-heal open wounds (sinuses) and scarring. Hidradenitis suppurativa also has significant psychological impact and many patients suffer from impairment of body image, depression and anxiety. The term hidradenitis implies it starts as an inflammatory disorder of sweat glands, which is now known to be incorrect. Hidradenitis suppurativa is also known as acne inversa. Who gets hidradenitis suppurativa? Hidradenitis often starts at puberty, and is most active between the ages of 21 and 36 years, and in women, can resolve at menopause. It is three times more common in females than in males. Risk factors include:  • Other family members with hidradenitis suppurativa  • Obesity and insulin resistance/metabolic syndrome  • Cigarette smoking  • Follicular occlusion disorders: acne conglobata, dissecting cellulitis, pilonidal sinus  • Inflammatory bowel disease (Crohn disease)  • Rare autoinflammatory syndromes associated with abnormalities of PSTPIP1 gene. *    * PAPA syndrome (pyogenic arthritis, pyoderma gangrenosum and acne), PASH syndrome (pyoderma gangrenosum, acne, suppurative hidradenitis) and PAPASH syndrome (pyogenic arthritis, pyoderma gangrenosum, acne, suppurative hidradenitis). What causes hidradenitis suppurativa? Hidradenitis suppurativa is an autoinflammatory disorder.  Although the exact cause is not yet understood, contributing factors include:  • Friction from clothing and body folds  • Aberrant immune response to commensal bacteria  • Abnormal cutaneous or follicular microbiome  • Follicular occlusion  • Release of pro-inflammatory cytokines  • Inflammation causing rupture of the follicular wall and destroying apocrine glands and ducts  • Secondary bacterial infection  • Certain drugs. What are the clinical features of hidradenitis suppurativa? Hidradenitis can affect a single or multiple areas in the armpits, neck, sub mammary area, and inner thighs. Anogenital involvement most commonly affects the groin, mons pubis, vulva (in females), sides of the scrotum (in males), perineum, buttocks and perianal folds. Signs include:  • Open and closed comedones  • Painful firm papules, larger nodules and pleated ridges  • Pustules, fluctuant pseudocysts and abscesses  • Pyogenic granulomas  • Draining sinuses linking inflammatory lesions  • Hypertrophic and atrophic scars. Many patients with hidradenitis suppurativa also suffer from other skin disorders, including acne, hirsutism and psoriasis. The severity and extent of hidradenitis suppurativa is recorded at assessment and when determining the impact of a treatment. The [place] system describes three distinct clinical stages:  1. Solitary or multiple, isolated abscess formation without scarring or sinus tracts  2. Recurrent abscesses, single or multiple widely  lesions, with sinus tract formation  3. Diffuse or broad involvement, with multiple interconnected sinus tracts and abscesses. Severe hidradenitis ([place] Stage 3) has been associated with:  • Male sex  • Axillary and perianal involvement  • Obesity  • Smoking  • Higher risk of stroke, coronary artery disease, heart failure, and peripheral artery disease  • Disease duration. What is the treatment for hidradenitis suppurativa?   General measures  • Weight loss; follow an anti-inflammatory, low-sugar, low-grain, low-dairy diet (mainly plants)  • Smoking cessation: this can lead to improvement within a few months  • Loose fitting clothing  • Daily unfragranced antiperspirants  • If prone to secondary infection, wash with antiseptics or take bleach baths  • Apply hydrogen peroxide solution or medical grade honey to reduce malodour  • Use peeling agents such as resorcinol 15% cream to de-roof nodules  • Apply simple dressings to draining sinuses  • Analgesics, such as paracetamol (acetaminophen), for pain control  • Seek help to manage anxiety and depression. Medical management of hidradenitis suppurativa  Medical management of hidradenitis suppurativa is difficult. Treatment is required long term. Effective options are listed below. Antibiotics  • Topical clindamycin, with benzoyl peroxide to reduce bacterial resistance  • Short course of oral antibiotics for acute staphylococcal abscesses, eg flucloxacillin  • Prolonged courses (minimum 3 months) of tetracycline, metronidazole, trimethoprim + sulphamethoxazole, fluoroquinolones, ertapenem or dapsone for their anti-inflammatory action  • 6-12 week courses of the combination of clindamycin (or doxycycline) and rifampicin for severe disease. Antiandrogens  • Long-term oral contraceptive pill; antiandrogenic progesterones drospirenone or cyproterone acetate may be more effective than standard combined pills. These are more suitable than progesterone-only pills or devices. • Spironolactone and finasteride  • Response takes 6 months or longer. Immunomodulatory treatments for severe disease  • Intralesional corticosteroids into nodules  • Systemic corticosteroids short-term for flares  • Methotrexate, ciclosporin, and azathioprine  • TNF-? inhibitors adalimumab and infliximab, used in higher dose than required for psoriasis, are the most successful treatments to date. Note that paradoxically, they may sometimes induce new-onset hidradenitis suppurativa  • Other biologics are under investigation, such as the IL-1?  antagonist, canakinumab    Other medical treatments  • Metformin in patients with insulin resistance  • Acitretin (unsuitable for females of childbearing potential)  • Isotretinoin -- effective for acne but appears unhelpful for most cases of hidradenitis  • Colchicine  • Medical management of anxiety and depression    Surgical management of hidradenitis suppurativa  • Incision and drainage of acute abscesses  • Curettage and deroofing of nodules, abscesses and sinuses  • Laser ablation of nodules, abscesses and sinuses  • Wide local excision of persistent nodules  • Radical excisional surgery of entire affected area  • Nd:YAG laser hair removal     HISTORY OF ATYPICAL NEVI-PENDING EXCISION  Physical Exam:  • Anatomic Location Affected:  Right arm, back       3 Result Notes     1 Follow-up Encounter      Component    Case Report   Surgical Pathology Report                         Case: [id number]                                    Authorizing Provider: Ana Oconnell MD Collected:           05/18/2023 1714               Ordering Location:     [facility]      Received:            05/18/2023 1714                                    [address] [place] [address]                                                                 Pathologist:           Benito Malhotra MD                                                            Specimens:   A) - Skin, Other, Specimen A: Right Arm                                                              B) - Skin, Other, Specimen B: Back                                                         Addendum 2   For specimen A, TERT promoter mutation analysis was attempted, but adequate amplification products for analysis were not able to obtained ("[employer] accession #: [id number]). The diagnosis remains unchanged.     Addendum electronically signed by Benito Malhotra MD on 7/11/2023 at  6:17 PM   Addendum   For specimen A, TERT promoter mutation analysis was attempted,, but adequate amplification products for analysis were not able to obtained ([employer] accession #: [id number]). The diagnosis remains unchanged. Addendum electronically signed by Shira Mackenzie MD on 6/8/2023 at  5:07 PM   Final Diagnosis   A. Skin, right upper arm, shave biopsy:     Atypical dermal spitzoid melanocytic proliferation; extending focally to the tissue edge (see note).     Note:  The histopathologic findings are compatible with DERMAL SPITZ NEVUS. Excision is recommended to ensure against local persistence/recurrence. The lesional cells are positive for SOX10 and MART-1, and they are negative for HMB45. Expression of p16 is retained. VUVBV005T expression is not present. Only focal, weak expression of PRAME is seen. Fluorescence in situ hybridization (FISH) analysis performed at [employer] using a 4-probe panel (6p25, 9p21/CEN9, 8q24, and 11q13) detected FISH signals within normal limits (accession #: [id number]). The results of TERT promoter mutational analysis will be reported as an addendum.              B. Skin, back, shave biopsy:     Lentiginous compound dysplastic nevus with moderate to focally severe cytologic atypia; extending to the tissue edges (see note).     Note: SOX10, HMB45, and MART-1 immunostains were performed and reviewed.          Electronically signed by Shira Mackenzie MD on 6/5/2023 at 10:10 AM         Comments: This is an appended report. These results have been appended to a previously preliminary verified report. 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 Assessment and Plan:  Based on a thorough discussion of this condition and the management approach to it (including a comprehensive discussion of the known risks, side effects and potential benefits of treatment), the patient (family) agrees to implement the following specific plan:    Discussed results of biopsied performed in May. Explained excision process.  Excision scheduled for 12/21/23 at 8:45 with Dr. Miko Mclean in [facility]. Potential progression to melanoma reviewed and patient expressed understanding of diagnosis and need for excision. LIPOMA  Physical Exam:  • Anatomic Location Affected:  Left upper arm  • Morphological Description:  10 cm soft mobile nodule  • Pertinent Positives:  • Pertinent Negatives: Additional History of Present Condition:  N/A    Assessment and Plan: Large lipoma not amenable to in office excision with local anesthesi  Based on a thorough discussion of this condition and the management approach to it (including a comprehensive discussion of the known risks, side effects and potential benefits of treatment), the patient (family) agrees to implement the following specific plan:  • Referral to general surgery for excision due to size. NEOPLASM OF UNCERTAIN BEHAVIOR OF SKIN    Physical Exam:  • (Anatomic Location); (Size and Morphological Description); (Differential Diagnosis):  o Left upper arm; 1.0 cm irregularly pigmented pink brown papule with central scar. Differential diagnosis: benign vs atypical nevus; rule out melanoma   • Pertinent Positives: Previously biopsied about 15 years ago  • Pertinent Negatives: Additional History of Present Condition:  Patient reports site was biopsied about 15 years ago. Patient does not have records and they likely have not been preserved as well over 10 years ago. Assessment and Plan:  • I have discussed with the patient that a sample of skin via a "skin biopsy” would be potentially helpful to further make a specific diagnosis under the microscope. • Based on a thorough discussion of this condition and the management approach to it (including a comprehensive discussion of the known risks, side effects and potential benefits of treatment), the patient (family) agrees to implement the following specific plan:    o Procedure:  Skin Biopsy.   After a thorough discussion of treatment options and risk/benefits/alternatives (including but not limited to local pain, scarring, dyspigmentation, blistering, possible superinfection, and inability to confirm a diagnosis via histopathology), verbal and written consent were obtained and portion of the rash was biopsied for tissue sample. See below for consent that was obtained from patient and subsequent Procedure Note. PROCEDURE TANGENTIAL (SHAVE) BIOPSY NOTE:    • Performing Physician: Jennie Holden  • Anatomic Location; Clinical Description with size (cm); Pre-Op Diagnosis:        Left upper arm; 1.0 cm irregularly pigmented pink brown papule with central scar. Differential diagnosis: benign vs atypical nevus; rule out melanoma. • Post-op diagnosis: Same     • Local anesthesia: 1% xylocaine with epi      • Topical anesthesia: None    • Hemostasis: Aluminum chloride       After obtaining informed consent  at which time there was a discussion about the purpose of biopsy  and low risks of infection and bleeding. The area was prepped and draped in the usual fashion. Anesthesia was obtained with 1% lidocaine with epinephrine. A shave biopsy to an appropriate sampling depth was obtained by Shave (Dermablade or 15 blade) The resulting wound was covered with surgical ointment and bandaged appropriately. The patient tolerated the procedure well without complications and was without signs of functional compromise. Specimen has been sent for review by Dermatopathology. Standard post-procedure care has been explained and has been included in written form within the patient's copy of Informed Consent. INFORMED CONSENT DISCUSSION AND POST-OPERATIVE INSTRUCTIONS FOR PATIENT    I.  RATIONALE FOR PROCEDURE  I understand that a skin biopsy allows the Dermatologist to test a lesion or rash under the microscope to obtain a diagnosis.   It usually involves numbing the area with numbing medication and removing a small piece of skin; sometimes the area will be closed with sutures. In this specific procedure, sutures are not usually needed. If any sutures are placed, then they are usually need to be removed in 2 weeks or less. I understand that my Dermatologist recommends that a skin "shave" biopsy be performed today. A local anesthetic, similar to the kind that a dentist uses when filling a cavity, will be injected with a very small needle into the skin area to be sampled. The injected skin and tissue underneath "will go to sleep” and become numb so no pain should be felt afterwards. An instrument shaped like a tiny "razor blade" (shave biopsy instrument) will be used to cut a small piece of tissue and skin from the area so that a sample of tissue can be taken and examined more closely under the microscope. A slight amount of bleeding will occur, but it will be stopped with direct pressure and a pressure bandage and any other appropriate methods. I understands that a scar will form where the wound was created. Surgical ointment will be applied to help protect the wound. Sutures are not usually needed. II.  RISKS AND POTENTIAL COMPLICATIONS   I understand the risks and potential complications of a skin biopsy include but are not limited to the following:  • Bleeding  • Infection  • Pain  • Scar/keloid  • Skin discoloration  • Incomplete Removal  • Recurrence  • Nerve Damage/Numbness/Loss of Function  • Allergic Reaction to Anesthesia  • Biopsies are diagnostic procedures and based on findings additional treatment or evaluation may be required  • Loss or destruction of specimen resulting in no additional findings    My Dermatologist has explained to me the nature of the condition, the nature of the procedure, and the benefits to be reasonably expected compared with alternative approaches.   My Dermatologist has discussed the likelihood of major risks or complications of this procedure including the specific risks listed above, such as bleeding, infection, and scarring/keloid. I understand that a scar is expected after this procedure. I understand that my physician cannot predict if the scar will form a "keloid," which extends beyond the borders of the wound that is created. A keloid is a thick, painful, and bumpy scar. A keloid can be difficult to treat, as it does not always respond well to therapy, which includes injecting cortisone directly into the keloid every few weeks. While this usually reduces the pain and size of the scar, it does not eliminate it. I understand that photographs may be taken before and after the procedure. These will be maintained as part of the medical providers confidential records and may not be made available to me. I further authorize the medical provider to use the photographs for teaching purposes or to illustrate scientific papers, books, or lectures if in his/her judgment, medical research, education, or science may benefit from its use. I have had an opportunity to fully inquire about the risks and benefits of this procedure and its alternatives. I have been given ample time and opportunity to ask questions and to seek a second opinion if I wished to do so. I acknowledge that there have specifically been no guarantees as to the cosmetic results from the procedure. I am aware that with any procedure there is always the possibility of an unexpected complication. III. POST-PROCEDURAL CARE (WHAT YOU WILL NEED TO DO "AFTER THE BIOPSY" TO OPTIMIZE HEALING)    • Keep the area clean and dry. Try NOT to remove the bandage or get it wet for the first 24 hours. • Gently clean the area and apply surgical ointment (such as Vaseline petrolatum ointment, which is available "over the counter" and not a prescription) to the biopsy site for up to 2 weeks straight. This acts to protect the wound from the outside world. • Sutures are not usually placed in this procedure.   If any sutures were placed, return for suture removal as instructed (generally 1 week for the face, 2 weeks for the body). • Take Acetaminophen (Tylenol) for discomfort, if no contraindications. Ibuprofen or aspirin could make bleeding worse. • Call our office immediately for signs of infection: fever, chills, increased redness, warmth, tenderness, discomfort/pain, or pus or foul smell coming from the wound. WHAT TO DO IF THERE IS ANY BLEEDING? If a small amount of bleeding is noticed, place a clean cloth over the area and apply firm pressure for ten minutes. Check the wound after 10 minutes of direct pressure. If bleeding persists, try one more time for an additional 10 minutes of direct pressure on the area. If the bleeding becomes heavier or does not stop after the second attempt, or if you have any other questions about this procedure, then please call your [facility]'s Dermatologist by calling [phone] (SKIN). I hereby acknowledge that I have reviewed and verified the site with my Dermatologist and have requested and authorized my Dermatologist to proceed with the procedure.          Scribe Attestation      I,:  Emma Robertson am acting as a scribe while in the presence of the attending physician.:       I,:  Flako Caballero MD personally performed the services described in this documentation    as scribed in my presence.:

## 2023-10-19 ENCOUNTER — TELEPHONE (OUTPATIENT)
Dept: DERMATOLOGY | Facility: CLINIC | Age: 38
End: 2023-10-19

## 2023-10-23 NOTE — PROGRESS NOTES
Assessment/Plan:   Rosy Mcmahon is a 41 y. o.female who is here for No chief complaint on file. On exam found to have Lipoma of the : left arm. Plan: Excise lesion(s) under anesthesia in the operating room    Positioning: supine and lateral, left side up - ask Dr. Narinder Frye Pain Management:   Motrin, Oxycontin, and Tylenol    - Patient has been instructed to avoid herbs or non-directed vitamins the week prior to surgery to ensure no drug interactions with perioperative surgical and anesthetic medications. - Patient should continue beta-blocker medication up through and including the day of surgery but hold any other hypertensive medications, including diuretics, unless instructed by PCP or anesthesia  - Patient should continue his statin medication up through and including the day of surgery.  - Hold metformin , If on this medication, the morning of surgery and do not resume until 48 hours AFTER surgery to avoid risk of lactic acidosis. Do not resume if eGFR is < 30  - Insulin Management:If on Insulin, patient advised to call PCP for explicit instructions. In general, will need to take one-half normal dose am of surgery but pt advised to consult PCP before making any changes. - Patient has been instructed to avoid aspirin containing medications or non-steroidal anti-inflammatory drugs for SEVEN days preceding surgery. Preoperative Clearance: None          _______________________________________________________  CC:No chief complaint on file. Yelena NOVA:  Rosy Mcmahon is a 41 y. o.female who was referred for evaluation of No chief complaint on file. .    Currently patient reports she has had the lipoma for year which has slowly grown and does cause occasional throbbing. She states she noticed the lump after a flu shot was given but now continues to grow and would like it removed. 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 Reports: growing and painful.  Location: upper extremity    A1c 8 months ago was 6.0  ROS:  General ROS: negative  negative for - chills, fatigue, fever or night sweats, weight loss  Respiratory ROS: no cough, shortness of breath, or wheezing  Cardiovascular ROS: no chest pain or dyspnea on exertion  Genito-Urinary ROS: no dysuria, trouble voiding, or hematuria  Musculoskeletal ROS: negative for - gait disturbance, joint pain or muscle pain  Neurological ROS: no TIA or stroke symptoms  Skin ROS: See HPI  GI ROS: see HPI  Skin ROS: no new rashes or lesions   Lymphatic ROS: no new adenopathy noted by pt. Psy ROS: no new mental or behavioral disturbances       Patient Active Problem List   Diagnosis    Allergic rhinitis    Moderate persistent asthma    Fatigue    Migraine without status migrainosus, not intractable    Seasonal affective disorder (HCC)    Hyperglycemia    Dyslipidemia    Morbid obesity with BMI of 40.0-44.9, adult ([facility])         Allergies:  Sulfa antibiotics      Current Outpatient Medications:     albuterol (Ventolin HFA) 90 mcg/act inhaler, Inhale 2 puffs every 4 (four) hours as needed for wheezing or shortness of breath, Disp: 18 g, Rfl: 2    clindamycin (CLEOCIN T) 1 % lotion, Apply topically 2 (two) times a day, Disp: 60 mL, Rfl: 3    metFORMIN (GLUCOPHAGE) 500 mg tablet, Take one pill for one month. If tolerating well, increase to two pills daily. Limit alcohol while on this medication. , Disp: 60 tablet, Rfl: 4    spironolactone (ALDACTONE) 50 mg tablet, Take 1 pill (50 mg) once daily for two weeks. Take with large glass of water. If well-tolerated, increase to 2 pills (100 mg) daily. Avoid large amounts of high potassium foods while on this medication. , Disp: 60 tablet, Rfl: 3    Past Medical History:   Diagnosis Date    Asthma     had vaccine     Diabetes mellitus ([address])     gestational DM diet controlled    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    only got  sperm tested, naturally conceived     Lumbar strain 7/16/2015    Varicella     chicken pox as child    Visual impairment     eyewear        Past Surgical History:   Procedure Laterality Date    FOOT SURGERY      WISDOM TOOTH EXTRACTION         Family History   Problem Relation Age of Onset    Multiple sclerosis Mother     Skin cancer Mother     Cancer Mother     Other Mother         Myocardial bridge    Diabetes Father     Other Father         Pericarditis    Miscarriages / Stillbirths Sister     Asthma Maternal Grandmother     Diabetes Paternal Grandmother     No Known Problems Son     Learning disabilities Other     No Known Problems Paternal Aunt     No Known Problems Maternal Aunt     Breast cancer Neg Hx     Colon cancer Neg Hx     Ovarian cancer Neg Hx     Uterine cancer Neg Hx     Cervical cancer Neg Hx         reports that she quit smoking about 6 years ago. Her smoking use included cigarettes. She started smoking about 18 years ago. She has a 6.00 pack-year smoking history. She has never used smokeless tobacco. She reports that she does not currently use alcohol. She reports that she does not use drugs. Vitals:    10/24/23 0934   BP: 125/86   Pulse: 72   Resp: 16   Temp: (!) 97.3 °F (36.3 °C)        PHYSICAL EXAM  General Appearance:    Alert, cooperative, no distress,    Head:    Normocephalic without obvious abnormality   Eyes:    PERRL, conjunctiva/corneas clear     Neck:   Supple, no adenopathy, no JVD   Back:     Symmetric, no spinal or CVA tenderness   Lungs:      Heart:     Abdomen:      Extremities:   Extremities normal. No clubbing, cyanosis or edema   Psych:   Normal Affect, AOx3. Neurologic:  Skin:   CNII-XII intact. Strength symmetric, speech intact    Warm, dry, intact, no visible rashes or lesions except as follows: 4-5 cm mobile soft mass on left upper extremity. Non-tender.                  Srinivasan Corral PA-C    Date: 10/24/2023 Time: 10:29 AM

## 2023-10-24 ENCOUNTER — CONSULT (OUTPATIENT)
Dept: SURGERY | Facility: CLINIC | Age: 38
End: 2023-10-24

## 2023-10-24 VITALS
RESPIRATION RATE: 16 BRPM | HEIGHT: 67 IN | WEIGHT: 282.2 LBS | DIASTOLIC BLOOD PRESSURE: 86 MMHG | SYSTOLIC BLOOD PRESSURE: 125 MMHG | HEART RATE: 72 BPM | BODY MASS INDEX: 44.29 KG/M2 | TEMPERATURE: 97.3 F

## 2023-10-24 DIAGNOSIS — D17.22 LIPOMA OF LEFT UPPER EXTREMITY: Primary | ICD-10-CM

## 2023-10-24 RX ORDER — SODIUM CHLORIDE, SODIUM LACTATE, POTASSIUM CHLORIDE, CALCIUM CHLORIDE 600; 310; 30; 20 MG/100ML; MG/100ML; MG/100ML; MG/100ML
125 INJECTION, SOLUTION INTRAVENOUS CONTINUOUS
OUTPATIENT
Start: 2024-01-08

## 2023-10-27 DIAGNOSIS — J45.40 MODERATE PERSISTENT ASTHMA WITHOUT COMPLICATION: ICD-10-CM

## 2023-10-27 RX ORDER — ALBUTEROL SULFATE 90 UG/1
2 AEROSOL, METERED RESPIRATORY (INHALATION) EVERY 4 HOURS PRN
Qty: 18 G | Refills: 0 | Status: SHIPPED | OUTPATIENT
Start: 2023-10-27

## 2023-11-02 ENCOUNTER — TELEPHONE (OUTPATIENT)
Dept: DERMATOLOGY | Facility: CLINIC | Age: 38
End: 2023-11-02

## 2023-11-02 PROCEDURE — 88305 TISSUE EXAM BY PATHOLOGIST: CPT | Performed by: DERMATOLOGY

## 2023-11-02 PROCEDURE — 88342 IMHCHEM/IMCYTCHM 1ST ANTB: CPT | Performed by: DERMATOLOGY

## 2023-11-02 PROCEDURE — 88344 IMHCHEM/IMCYTCHM EA MLT ANTB: CPT | Performed by: DERMATOLOGY

## 2023-11-02 PROCEDURE — 88341 IMHCHEM/IMCYTCHM EA ADD ANTB: CPT | Performed by: DERMATOLOGY

## 2023-11-03 ENCOUNTER — DOCUMENTATION (OUTPATIENT)
Dept: DERMATOLOGY | Facility: CLINIC | Age: 38
End: 2023-11-03

## 2023-11-03 ENCOUNTER — TELEPHONE (OUTPATIENT)
Dept: FAMILY MEDICINE CLINIC | Facility: CLINIC | Age: 38
End: 2023-11-03

## 2023-11-03 ENCOUNTER — TELEPHONE (OUTPATIENT)
Dept: HEMATOLOGY ONCOLOGY | Facility: CLINIC | Age: 38
End: 2023-11-03

## 2023-11-03 DIAGNOSIS — D22.9 NEVUS, ATYPICAL: Primary | ICD-10-CM

## 2023-11-03 NOTE — PROGRESS NOTES
Attempted to call [address] to inquire about any prior records or pathology report from prior biopsy on left arm. Was sent to voicemail- detailed message left for callback.

## 2023-11-03 NOTE — TELEPHONE ENCOUNTER
I called Krystian Singh in response to a referral that was received for patient to establish care with Surgical Oncology. Outreach was made to schedule a consultation. I left a voicemail explaining the reason for my call and advised patient to call [facility] at [phone]. Another attempt will be made to contact patient. I called Krystian iSngh in response to a referral that was received for patient to establish care with Hematology. Outreach was made to schedule a consultation. I left a voicemail explaining the reason for my call and advised patient to call [facility] at [phone]. Another attempt will be made to contact patient.

## 2023-11-03 NOTE — TELEPHONE ENCOUNTER
Hi, this is Doctor Indy Vanegas. I'm a dermatologist with [facility]. I'm calling about patient Bryson Castillo, MRN, [id number]. She said I just biopsied something on her that's fairly concerning and she said that it was previously biopsied like 10:00-brooklynn years ago, but she's not exactly sure with Doctor Mario horta when it was on [employer]. I'm just trying to find out if you guys have any records on the pathology or have the pathology report. She said she doesn't know what it was and wasn't really told the diagnosis, but I was just hoping maybe there was a path report available on that. If you could call me back. My cell phone is [phone]. Thank you.  Bye.

## 2023-11-06 ENCOUNTER — DOCUMENTATION (OUTPATIENT)
Dept: DERMATOLOGY | Facility: CLINIC | Age: 38
End: 2023-11-06

## 2023-11-06 NOTE — TELEPHONE ENCOUNTER
I lmom of Dr. Stefania Fierro phone asking her to call and give a fax number that I can fax the requested paperwork to.

## 2023-11-06 NOTE — PROGRESS NOTES
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 female who was presented at the Melanoma & Cutaneous Oncology Multidisciplinary Tumor Conference today for case review and treatment recommendations. PHYSICIAN RECOMMENDED PLAN: Wide local excision. Decision to perform SNB will be left up to patient after detailed discussion of risks and benefits. Pathology reviewed:  yes    Radiology reveiwed: yes    Referrals:  yes    Procedures: Wide local excision with possible SNB    Team agreed to plan. The final treatment plan will be left to the discretion of the patient and the treating physician. DISCLAIMERS:    TO THE TREATING PHYSICIAN:  This conference is a meeting of clinicians from various specialty areas who evaluate and discuss patients for whom a multidisciplinary treatment approach is being considered. Please note that the above opinion was a consensus of the conference attendees and is intended only to assist in quality care of the discussed patient. The responsibility for follow up on the input given during the conference, along with any final decisions regarding plan of care, is that of the patient and the patient's provider. Accordingly, appointments have only been recommended based on this information and have NOT been scheduled unless otherwise noted. TO THE PATIENT:  This summary is a brief record of major aspects of your cancer treatment. You may choose to share a copy with any of your doctors or nurses. However, this is not a detailed or comprehensive record of your care. NCCN guidelines were available for review during this discussion.

## 2023-11-08 ENCOUNTER — DOCUMENTATION (OUTPATIENT)
Dept: HEMATOLOGY ONCOLOGY | Facility: CLINIC | Age: 38
End: 2023-11-08

## 2023-11-08 ENCOUNTER — TELEPHONE (OUTPATIENT)
Age: 38
End: 2023-11-08

## 2023-11-08 NOTE — TELEPHONE ENCOUNTER
Patient is returning a mauro she wants to clarify if Dr. Ta Leonard wants her to be seen to follow up on the biopsy in person or if it's just for the HS. Patient is not able to come in if it's for the HS she says that it is well under controlled at this moment. But if it's in regards to her biopsy and it's to re check the site then she will try her best to make it? Patient has Dr. Anna Adan number but she doesn't feel comfortable calling. She was not sure per her previous call with the nurse.

## 2023-11-08 NOTE — PROGRESS NOTES
Intake received/ Chart reviewed for services completed outside of [facility]    Pathology completed: 11/3/23  Pathology slides were sent to [facility] for second opinion but we received them back    All records needed are in patients chart. No records retrieval needed at this time.

## 2023-11-21 ENCOUNTER — TELEPHONE (OUTPATIENT)
Dept: DERMATOLOGY | Facility: CLINIC | Age: 38
End: 2023-11-21

## 2023-11-21 NOTE — TELEPHONE ENCOUNTER
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 Patient with appt with Dr. Yvrose Ho tomorrow. Plan at this time is WLE and SLNB which was conveyed to patient when I spoke with her last given that a nevoidal melanoma is favored. No answer- left voicemail.

## 2023-11-22 ENCOUNTER — CONSULT (OUTPATIENT)
Dept: HEMATOLOGY ONCOLOGY | Facility: CLINIC | Age: 38
End: 2023-11-22
Payer: COMMERCIAL

## 2023-11-22 VITALS
BODY MASS INDEX: 43.47 KG/M2 | HEART RATE: 105 BPM | RESPIRATION RATE: 18 BRPM | WEIGHT: 277 LBS | DIASTOLIC BLOOD PRESSURE: 78 MMHG | OXYGEN SATURATION: 98 % | HEIGHT: 67 IN | TEMPERATURE: 98 F | SYSTOLIC BLOOD PRESSURE: 128 MMHG

## 2023-11-22 DIAGNOSIS — N63.20 MASS OF LEFT BREAST, UNSPECIFIED QUADRANT: ICD-10-CM

## 2023-11-22 DIAGNOSIS — L98.9 SKIN LESION OF RIGHT ARM: ICD-10-CM

## 2023-11-22 DIAGNOSIS — L98.9 BACK SKIN LESION: ICD-10-CM

## 2023-11-22 DIAGNOSIS — C43.62 MALIGNANT MELANOMA OF LEFT UPPER EXTREMITY INCLUDING SHOULDER (HCC): Primary | ICD-10-CM

## 2023-11-22 PROCEDURE — 99244 OFF/OP CNSLTJ NEW/EST MOD 40: CPT | Performed by: INTERNAL MEDICINE

## 2023-11-22 NOTE — LETTER
December 7, 2023     Carmen Hennessy, DO  [id number] [facility] [address]    Patient: Brooke Nash   YOB: 1985   Date of Visit: 11/22/2023       Dear Dr. Duane Flies: Thank you for referring Brooke Nash to me for evaluation. Below are my notes for this consultation. If you have questions, please do not hesitate to call me. I look forward to following your patient along with you. Sincerely,        Luz Elena Fraser MD        CC: MD Michael Lawson MD Mardell Guardian, DO  12/7/2023  9:01 AM  Attested    Oncology Outpatient Consult Note  Brooke Nash 41 y.o. 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 Patient had shave biopsy of left arm lesion 9/28/23 with pathology  1.2 mm thick, 1 mitoses, positive margins, pT2.  "indeterminate; melanocytic lesion of uncertain malignant potential (MELTUMP). There are concerning features, including cytologic atypia, rare mitoses, diminished MelanA, and apparent p16 loss, but FISH for melanoma, BRAF V600E immunostaining, NGS fusion panel, and TERT gene promoter mutation analysis were all negative. MET Exon 14 deletion analysis and EGFRvIII are not detected.  Path was reviewed at [facility] with Dr. Enriqueta Cespedes and Dr. Delmis Lambert, was discussed Derm oncology tumor board and reviewed again by [facility] dermatologist, Dr. 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 Dayne Lr - "updated pathology results favoring nevoidal melanoma, 1.2 mm at least" with plan for wide lesion excision and sentinel lymph node biopsy";      5/18/23 Skin, right upper arm, shave biopsy: Atypical dermal spitzoid melanocytic proliferation; extending focally to the tissue edge    5/18/23 Skin, back, shave biopsy: Lentiginous compound dysplastic nevus with moderate to focally severe cytologic atypia; extending to the tissue edges (see note). Smoking - former smoker , early 19's , 1 pack / day x 10 years  ; no alcohol use - only occasional   Heritage : [place]   Hair color: dark brown   sun exposure not significant; work ER RN    Tanning : early 20's , ~ weekly tan use x 1-2 years    Family hx : mother has 2 skin graft "unsure why" ; has a 9yo son     Assessment/ Plan:    1. Malignant melanoma of left upper extremity including shoulder ([address])  - has appointment with Dr. Gonzalez Harrison to plan for potential excision biopsy and SLNB of left arm Nevoidal Melanoma   - counseled on using sun block, and same for family/son to avoid significant sun exposure, and use hat/sun block etc given relative higher risk for skin cancers ; patient to ask and clarify whether her mother had skin cancer or not   - Ambulatory Referral to Hematology / Oncology    2. Back skin lesion  3. Skin lesion of right arm  - has schedueled excisional bx of her back and right arm lesions 1/8/2024     4. Mass of left breast, unspecified quadrant  - to conintue follow up with PCP & GYN on her L breat mass , has repeate US L breast schedyuel don 11/28/23   - advised to keep diary of her L breast pain (reportedly occasional) and her periods   ________________________________________    Labs and imaging studies are reviewed by ordering provider once results are available.  If there are findings that need immediate attention, you will be contacted when results available. Discussing results and the implication on your healthcare is best discussed in person at your follow-up visit. Cancer Staging:  Cancer Staging   No matching staging information was found for the patient. Molecular Testing:     Previous Hematologic/ Oncologic History:    Oncology History    No history exists. Test Results:    Imaging: . No results found. Labs:   Lab Results   Component Value Date    WBC 7.25 08/29/2022    HGB 12.5 08/29/2022    HCT 38.8 08/29/2022    MCV 90 08/29/2022     08/29/2022     Lab Results   Component Value Date     03/14/2014    K 4.1 02/08/2023     02/08/2023    CO2 26 02/08/2023    ANIONGAP 7 03/14/2014    BUN 11 02/08/2023    CREATININE 0.85 02/08/2023    GLUCOSE 81 03/14/2014    GLUF 116 (H) 02/08/2023    CALCIUM 8.8 02/08/2023    AST 14 02/08/2023    ALT 19 02/08/2023    ALKPHOS 64 02/08/2023    PROT 7.5 03/14/2014    BILITOT 0.8 03/14/2014    EGFR 88 02/08/2023         No results found for: "SPEP", "UPEP"    No results found for: "PSA"    No results found for: "CEA"    No results found for: ""    No results found for: "AFP"    No results found for: "IRON", "TIBC", "FERRITIN"    No results found for: "WYBCKKKP19"        ROS:   - GENERAL: Negative for any nausea, vomiting, fevers, chills, or weight loss. - HEENT: Negative for any head/Neck trauma, pain, double/blurry vision, sinusitis, rhinitis, nose bleeding.  - CARDIAC: Negative for any chest pain, palpitation, Dyspnea on exertion, peripheral edema.   - Chest: occasional L breast pain "sometimes" x few months , with no specific correlation to position , not self-palpable,   - PULMONARY: Negative for any SOB, cough, wheezing.   - GASTROINTESTINAL: Negative for any abdominal pain, N/V/D/C, blood in stool.   - GENITOURINARY: Negative for any dysuria, hematuria, incontinence.  - NEUROLOGIC: Negative for any muscle weakness, numbness/tingling, memory changes. - MUSCULOSKELETAL: Negative for any joint pains/swelling, limited ROM. - INTEGUMENTARY: left arm lesion was stable about 1.5 cm x about 10 years , pinkish, unchanged in size or color; other back and right arm lesions s/p biopsy, see above. - HEMATOLOGIC: Negative for any abnormal bruising, frequent infections or bleeding. Active Problems:   Patient Active Problem List   Diagnosis   • Allergic rhinitis   • Moderate persistent asthma   • Fatigue   • Migraine without status migrainosus, not intractable   • Seasonal affective disorder (HCC)   • Hyperglycemia   • Dyslipidemia   • Morbid obesity with BMI of 40.0-44.9, adult ([facility])       Past Medical History:   Past Medical History:   Diagnosis Date   • Asthma     had vaccine    • Diabetes mellitus ([address])     gestational DM diet controlled   • 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    only got  sperm tested, naturally conceived    • Lumbar strain 7/16/2015   • Varicella     chicken pox as child   • Visual impairment     eyewear        Surgical History:   Past Surgical History:   Procedure Laterality Date   • FOOT SURGERY     • WISDOM TOOTH EXTRACTION         Family History:    Family History   Problem Relation Age of Onset   • Multiple sclerosis Mother    • Skin cancer Mother    • Cancer Mother    • Other Mother         Myocardial bridge   • Diabetes Father    • Other Father         Pericarditis   • Miscarriages / [place] Sister    • Asthma Maternal Grandmother    • Diabetes Paternal Grandmother    • No Known Problems Son    • Learning disabilities Other    • No Known Problems Paternal Aunt    • No Known Problems Maternal Aunt    • Breast cancer Neg Hx    • Colon cancer Neg Hx    • Ovarian cancer Neg Hx    • Uterine cancer Neg Hx    • Cervical cancer Neg Hx        Cancer-related family history includes Cancer in her mother; Skin cancer in her mother.  There is no history of Breast cancer, Colon cancer, Ovarian cancer, Uterine cancer, or Cervical cancer. Social History:   Social History     Socioeconomic History   • Marital status: /Civil Union     Spouse name: Not on file   • Number of children: Not on file   • Years of education: Not on file   • Highest education level: Not on file   Occupational History   • Occupation: [employer]     Employer: [employer]YE[employer]   Tobacco Use   • Smoking status: Former     Packs/day: 0.50     Years: 12.00     Total pack years: 6.00     Types: Cigarettes     Start date:      Quit date: 2017     Years since quittin.3   • Smokeless tobacco: Never   Vaping Use   • Vaping Use: Never used   Substance and Sexual Activity   • Alcohol use: Not Currently     Comment: barley   • Drug use: No   • Sexual activity: Yes     Partners: Male     Birth control/protection: None     Comment: withdrawl   Other Topics Concern   • Not on file   Social History Narrative    Caffeine use     Social Determinants of Health     Financial Resource Strain: Not on file   Food Insecurity: Not on file   Transportation Needs: Not on file   Physical Activity: Not on file   Stress: Not on file   Social Connections: Not on file   Intimate Partner Violence: Not on file   Housing Stability: Not on file       Current Medications:   Current Outpatient Medications   Medication Sig Dispense Refill   • albuterol (Ventolin HFA) 90 mcg/act inhaler Inhale 2 puffs every 4 (four) hours as needed for wheezing or shortness of breath 18 g 0   • clindamycin (CLEOCIN T) 1 % lotion Apply topically 2 (two) times a day 60 mL 3   • metFORMIN (GLUCOPHAGE) 500 mg tablet Take one pill for one month. If tolerating well, increase to two pills daily. Limit alcohol while on this medication. 60 tablet 4   • spironolactone (ALDACTONE) 50 mg tablet Take 1 pill (50 mg) once daily for two weeks. Take with large glass of water. If well-tolerated, increase to 2 pills (100 mg) daily. Avoid large amounts of high potassium foods while on this medication.  60 tablet 3     No current facility-administered medications for this visit. Allergies: Allergies   Allergen Reactions   • Sulfa Antibiotics Other (See Comments)     Pt does not know what happens when she takes was a child when had the allergy         Physical Exam:  There is no height or weight on file to calculate BSA. Wt Readings from Last 3 Encounters:   10/24/23 128 kg (282 lb 3.2 oz)   09/28/23 125 kg (276 lb 4.8 oz)   05/18/23 127 kg (280 lb)        Temp Readings from Last 3 Encounters:   10/24/23 (!) 97.3 °F (36.3 °C)   09/28/23 98.4 °F (36.9 °C) (Tympanic)   05/18/23 97.8 °F (36.6 °C)        BP Readings from Last 3 Encounters:   10/24/23 125/86   05/05/23 142/80   03/16/23 104/84         Pulse Readings from Last 3 Encounters:   10/24/23 72   05/05/23 101   02/09/23 88     @LASTSAO2(3)@    - GEN: Appears well, alert and oriented x 3, pleasant and cooperative, in no acute distress    - HEENT: Anicteric, mucous membranes moist, PERRL and EOMI   - NECK: No lymphadenopathy, JVD or carotid bruits   - HEART: RRR, normal S1 and S2, no murmurs, clicks, gallops or rubs   - LUNGS: Clear to auscultation bilaterally; no wheezes, rales, or rhonchi  - ABDOMEN: Normal bowel sounds, soft, no tenderness, no distention, no organomegaly or masses felt on exam.   - EXTREMITIES: Peripheral pulses normal; no clubbing, cyanosis, or edema  - NEURO: No focal findings, CN II-XII are grossly intact. - Musculoskeletal: 5/5 strength, normal ROM, no swollen or erythematous joints. - SKIN: left forearm with ~ 1.5 cm ovallesion , pinkish , slightly raised ~ 1 mm ; posterior to it there is lipma like mass ~ 3 x2 cm , non tender to palpation , not hard. Left arm and back with well healed shave biopsy sites. Goals and Barriers:  Current Goal: Prolong Survival from Cancer. Barriers: None. Patient's Capacity to Self Care:  Patient is able to self care.     Vince Chin DO           Attestation signed by Jessica Augustin MD at 12/7/2023 9:01 AM:    Standard Teaching Supervising Statement    I have reviewed the note performed and documented by the [address] [place] [address]. I personally performed the required components/examined the patient. I agree with the Fellow's findings and plan of care with the following additions/exceptions:     Ms. Debra Hunter is a 67-year-old female seen in consultation for a new diagnosis of nevoid melanoma. History is obtained from the patient, review of records, and discussion with Dr. Omar Lagos. Patient had a biopsy approximately 10 years ago in the same location that resulted in clearing in the middle but still pigment around the outside. She noted no changes or concerns of the lesion and she was told the previous biopsy was okay. We have not been able to obtain records for this. Recently she noticed a lump in the area, a lipoma reaction from previous flu shot that she says is getting bigger and more bothersome. She went to have this evaluated and they noticed the lesion at the site of the previous diagnosis that looked concerning. Biopsy was performed on 9/28/2023 with review by our dermatopathologist as well as external with diagnosis originally of Manpreet Leiva after external consult with Dr. Consuelo Pennington at [address] [place] [address] and later nevoid melanoma here by Dr. Saskia Gaspar. Pathology demonstrated 1.2 mm thickness of the lesion with epithelioid and spindled melanocytes with cytologic atypia significant loss of p16 expression and single mitotic figure. No ulceration seen. Larry level IV. Nevoid melanoma. 1 mitoses per meter square. No microsatellites, lymphovascular vision, neurotropism, or regression. Positive deep and peripheral margin. This case was discussed at tumor board and will see surgical oncology for wide local excision and sentinel lymph node biopsy given the depth. She describes having some exposure when she was younger.   She denies family history of melanoma although does say her mom had 2 skin graft but unclear why. Denies family history of breast, ovarian, pancreatic cancer. Up-to-date on all age-appropriate screenings. On exam ECOG PS 0. Healed biopsy site. Large lipoma posterior and inferior to the biopsy site with noticeable bulge in the skin. No pain on palpation. No lymphadenopathy. No other concerning skin lesions at this time. Ms. Jen Goldman is a 71-year-old female with newly diagnosed stage Ib melanoma/nevoid melanoma here for disease management discussion. I had an extensive discussion with the patient regarding her  diagnosis, prognosis, and recommendations for further management. We reviewed her melanoma history, current findings, pathology and imaging tests. We did discuss the differential diagnosis including MELTUMP -melanocytic lesion of uncertain malignant potential, versus nevoid melanoma. Regardless we would recommend wide local excision and sentinel lymph node biopsy. Discussed that presence of cells in the lymph node would not be uncommon as well MELTUMPs. However underlying diagnosis is nevoid melanoma. She will see Dr. Deanna Jmaes and undergo wide local excision and sentinel lymph node biopsy. Discussed that further recommendations would be based on final pathology and staging after this procedure. We discussed that she will require ongoing monitoring and surveillance, both for disease recurrence as well as a new primary melanoma as well as other nonmelanoma skin cancers. This includes physical exam and labs, including a comprehensive metabolic panel, CBC with differential and LDH; periodic imaging studies with either CT chest, abdomen and pelvis or PET/CT scans every 6 months. We discussed the importance of regular cutaneous self examinations and reviewed the features of lesions that could be concerning for skin cancer. I did explain that she  is at risk for developing another primary melanoma as well.  We also discussed avoidance of unnecessary sun exposure and use of sun protective clothing and sunscreen. I also recommended routine follow up and skin checks with dermatology. Family Screening: her  first-degree relatives, namely his siblings, parents, and children, have an increased risk of developing a melanoma over the general population given. her  diagnosis of melanoma, and should have annual dermatologic screening. Ms. Shruti Blanchard had all her questions and concerns addressed and she knows to call with any additional issues. Thank you for allowing me to participate in [facility]'s care.       Hiro Cruz MD, PhD

## 2023-11-22 NOTE — PROGRESS NOTES
Oncology Outpatient Consult Note  Pricila Groves 41 y.o. 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 Patient had shave biopsy of left arm lesion 9/28/23 with pathology  1.2 mm thick, 1 mitoses, positive margins, pT2.  "indeterminate; melanocytic lesion of uncertain malignant potential (MELTUMP). There are concerning features, including cytologic atypia, rare mitoses, diminished MelanA, and apparent p16 loss, but FISH for melanoma, BRAF V600E immunostaining, NGS fusion panel, and TERT gene promoter mutation analysis were all negative. MET Exon 14 deletion analysis and EGFRvIII are not detected. Path was reviewed at [facility] with Dr. Zoila Jiménez and Dr. Pastor Wagner, was discussed Derm oncology tumor board and reviewed again by [facility] dermatologist, Dr. 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 Raymundo Parson - "updated pathology results favoring nevoidal melanoma, 1.2 mm at least" with plan for wide lesion excision and sentinel lymph node biopsy";      5/18/23 Skin, right upper arm, shave biopsy: Atypical dermal spitzoid melanocytic proliferation; extending focally to the tissue edge    5/18/23 Skin, back, shave biopsy: Lentiginous compound dysplastic nevus with moderate to focally severe cytologic atypia; extending to the tissue edges (see note).        Smoking - former smoker , early 19's , 1 pack / day x 10 years  ; no alcohol use - only occasional   Heritage : [place]   Hair color: dark brown   sun exposure not significant; work ER RN    Tanning : early 20's , ~ weekly tan use x 1-2 years    Family hx : mother has 2 skin graft "unsure why" ; has a 7yo son     Assessment/ Plan:    1. Malignant melanoma of left upper extremity including shoulder ([address])  - has appointment with Dr. Pee Winchester to plan for potential excision biopsy and SLNB of left arm Nevoidal Melanoma   - counseled on using sun block, and same for family/son to avoid significant sun exposure, and use hat/sun block etc given relative higher risk for skin cancers ; patient to ask and clarify whether her mother had skin cancer or not   - Ambulatory Referral to Hematology / Oncology    2. Back skin lesion  3. Skin lesion of right arm  - has schedueled excisional bx of her back and right arm lesions 1/8/2024     4. Mass of left breast, unspecified quadrant  - to conintue follow up with PCP & GYN on her L breat mass , has repeate US L breast schedyuel don 11/28/23   - advised to keep diary of her L breast pain (reportedly occasional) and her periods   ________________________________________    Labs and imaging studies are reviewed by ordering provider once results are available. If there are findings that need immediate attention, you will be contacted when results available. Discussing results and the implication on your healthcare is best discussed in person at your follow-up visit. Cancer Staging:  Cancer Staging   No matching staging information was found for the patient. Molecular Testing:     Previous Hematologic/ Oncologic History:    Oncology History    No history exists. Test Results:    Imaging: . No results found.     Labs:   Lab Results   Component Value Date    WBC 7.25 08/29/2022    HGB 12.5 08/29/2022    HCT 38.8 08/29/2022    MCV 90 08/29/2022     08/29/2022     Lab Results Component Value Date     03/14/2014    K 4.1 02/08/2023     02/08/2023    CO2 26 02/08/2023    ANIONGAP 7 03/14/2014    BUN 11 02/08/2023    CREATININE 0.85 02/08/2023    GLUCOSE 81 03/14/2014    GLUF 116 (H) 02/08/2023    CALCIUM 8.8 02/08/2023    AST 14 02/08/2023    ALT 19 02/08/2023    ALKPHOS 64 02/08/2023    PROT 7.5 03/14/2014    BILITOT 0.8 03/14/2014    EGFR 88 02/08/2023         No results found for: "SPEP", "UPEP"    No results found for: "PSA"    No results found for: "CEA"    No results found for: ""    No results found for: "AFP"    No results found for: "IRON", "TIBC", "FERRITIN"    No results found for: "WHTOKMQP36"        ROS:   - GENERAL: Negative for any nausea, vomiting, fevers, chills, or weight loss. - HEENT: Negative for any head/Neck trauma, pain, double/blurry vision, sinusitis, rhinitis, nose bleeding.  - CARDIAC: Negative for any chest pain, palpitation, Dyspnea on exertion, peripheral edema. - Chest: occasional L breast pain "sometimes" x few months , with no specific correlation to position , not self-palpable,   - PULMONARY: Negative for any SOB, cough, wheezing.   - GASTROINTESTINAL: Negative for any abdominal pain, N/V/D/C, blood in stool.   - GENITOURINARY: Negative for any dysuria, hematuria, incontinence.  - NEUROLOGIC: Negative for any muscle weakness, numbness/tingling, memory changes. - MUSCULOSKELETAL: Negative for any joint pains/swelling, limited ROM. - INTEGUMENTARY: left arm lesion was stable about 1.5 cm x about 10 years , pinkish, unchanged in size or color; other back and right arm lesions s/p biopsy, see above. - HEMATOLOGIC: Negative for any abnormal bruising, frequent infections or bleeding.           Active Problems:   Patient Active Problem List   Diagnosis    Allergic rhinitis    Moderate persistent asthma    Fatigue    Migraine without status migrainosus, not intractable    Seasonal affective disorder (HCC)    Hyperglycemia Dyslipidemia    Morbid obesity with BMI of 40.0-44.9, adult [facility])       Past Medical History:   Past Medical History:   Diagnosis Date    Asthma     had vaccine     Diabetes mellitus ([address] [facility])     gestational DM diet controlled    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    only got  sperm tested, naturally conceived     Lumbar strain 2015    Varicella     chicken pox as child    Visual impairment     eyewear        Surgical History:   Past Surgical History:   Procedure Laterality Date    FOOT SURGERY      WISDOM TOOTH EXTRACTION         Family History:    Family History   Problem Relation Age of Onset    Multiple sclerosis Mother     Skin cancer Mother     Cancer Mother     Other Mother         Myocardial bridge    Diabetes Father     Other Father         Pericarditis    Miscarriages / Stillbirths Sister     Asthma Maternal Grandmother     Diabetes Paternal Grandmother     No Known Problems Son     Learning disabilities Other     No Known Problems Paternal Aunt     No Known Problems Maternal Aunt     Breast cancer Neg Hx     Colon cancer Neg Hx     Ovarian cancer Neg Hx     Uterine cancer Neg Hx     Cervical cancer Neg Hx        Cancer-related family history includes Cancer in her mother; Skin cancer in her mother. There is no history of Breast cancer, Colon cancer, Ovarian cancer, Uterine cancer, or Cervical cancer.     Social History:   Social History     Socioeconomic History    Marital status: /Civil Union     Spouse name: Not on file    Number of children: Not on file    Years of education: Not on file    Highest education level: Not on file   Occupational History    Occupation: [employer]     Employer: [employer]   Tobacco Use    Smoking status: Former     Packs/day: 0.50     Years: 12.00     Total pack years: 6.00     Types: Cigarettes     Start date:      Quit date: 2017     Years since quittin.3    Smokeless tobacco: Never   Vaping Use    Vaping Use: Never used   Substance and Sexual Activity Alcohol use: Not Currently     Comment: barley    Drug use: No    Sexual activity: Yes     Partners: Male     Birth control/protection: None     Comment: withdrawl   Other Topics Concern    Not on file   Social History Narrative    Caffeine use     Social Determinants of Health     Financial Resource Strain: Not on file   Food Insecurity: Not on file   Transportation Needs: Not on file   Physical Activity: Not on file   Stress: Not on file   Social Connections: Not on file   Intimate Partner Violence: Not on file   Housing Stability: Not on file       Current Medications:   Current Outpatient Medications   Medication Sig Dispense Refill    albuterol (Ventolin HFA) 90 mcg/act inhaler Inhale 2 puffs every 4 (four) hours as needed for wheezing or shortness of breath 18 g 0    clindamycin (CLEOCIN T) 1 % lotion Apply topically 2 (two) times a day 60 mL 3    metFORMIN (GLUCOPHAGE) 500 mg tablet Take one pill for one month. If tolerating well, increase to two pills daily. Limit alcohol while on this medication. 60 tablet 4    spironolactone (ALDACTONE) 50 mg tablet Take 1 pill (50 mg) once daily for two weeks. Take with large glass of water. If well-tolerated, increase to 2 pills (100 mg) daily. Avoid large amounts of high potassium foods while on this medication. 60 tablet 3     No current facility-administered medications for this visit. Allergies: Allergies   Allergen Reactions    Sulfa Antibiotics Other (See Comments)     Pt does not know what happens when she takes was a child when had the allergy         Physical Exam:  There is no height or weight on file to calculate BSA.     Wt Readings from Last 3 Encounters:   10/24/23 128 kg (282 lb 3.2 oz)   09/28/23 125 kg (276 lb 4.8 oz)   05/18/23 127 kg (280 lb)        Temp Readings from Last 3 Encounters:   10/24/23 (!) 97.3 °F (36.3 °C)   09/28/23 98.4 °F (36.9 °C) (Tympanic)   05/18/23 97.8 °F (36.6 °C)        BP Readings from Last 3 Encounters:   10/24/23 125/86   05/05/23 142/80   03/16/23 104/84         Pulse Readings from Last 3 Encounters:   10/24/23 72   05/05/23 101   02/09/23 88     @LASTSAO2(3)@    - GEN: Appears well, alert and oriented x 3, pleasant and cooperative, in no acute distress    - HEENT: Anicteric, mucous membranes moist, PERRL and EOMI   - NECK: No lymphadenopathy, JVD or carotid bruits   - HEART: RRR, normal S1 and S2, no murmurs, clicks, gallops or rubs   - LUNGS: Clear to auscultation bilaterally; no wheezes, rales, or rhonchi  - ABDOMEN: Normal bowel sounds, soft, no tenderness, no distention, no organomegaly or masses felt on exam.   - EXTREMITIES: Peripheral pulses normal; no clubbing, cyanosis, or edema  - NEURO: No focal findings, CN II-XII are grossly intact. - Musculoskeletal: 5/5 strength, normal ROM, no swollen or erythematous joints. - SKIN: left forearm with ~ 1.5 cm ovallesion , pinkish , slightly raised ~ 1 mm ; posterior to it there is lipma like mass ~ 3 x2 cm , non tender to palpation , not hard. Left arm and back with well healed shave biopsy sites. Goals and Barriers:  Current Goal: Prolong Survival from Cancer. Barriers: None. Patient's Capacity to Self Care:  Patient is able to self care.     Minesh Sauceda DO

## 2023-11-28 ENCOUNTER — HOSPITAL ENCOUNTER (OUTPATIENT)
Dept: ULTRASOUND IMAGING | Facility: CLINIC | Age: 38
Discharge: HOME | End: 2023-11-28
Payer: COMMERCIAL

## 2023-11-28 DIAGNOSIS — R92.8 ABNORMAL MAMMOGRAM: ICD-10-CM

## 2023-11-28 PROCEDURE — 76642 ULTRASOUND BREAST LIMITED: CPT

## 2023-11-28 NOTE — PROGRESS NOTES
Met with patient and Dr. Rivera  regarding recommendation for;    _____ RIGHT ____x__LEFT      ___x__Ultrasound guided  ______Stereotactic breast biopsy.      __X___Verbalized understanding.      Blood thinners:  No: __x___ Yes: ______ What:                 Biopsy teaching sheet given:  Yes: ___X___ No: ________    Pt given contact information and adv to call with any questions/needs    Patient advised to arrive at .1:30.. for a .2:00.. appointment

## 2023-11-29 ENCOUNTER — TELEPHONE (OUTPATIENT)
Dept: DERMATOLOGY | Facility: CLINIC | Age: 38
End: 2023-11-29

## 2023-11-29 NOTE — TELEPHONE ENCOUNTER
Attempted to call patient for update and to discuss pathology to ensure she has no questions at this time. Left voicemail for callback. Also sent [employer] message previously.

## 2023-12-13 ENCOUNTER — HOSPITAL ENCOUNTER (OUTPATIENT)
Dept: MAMMOGRAPHY | Facility: CLINIC | Age: 38
Discharge: HOME | End: 2023-12-13
Payer: COMMERCIAL

## 2023-12-13 ENCOUNTER — HOSPITAL ENCOUNTER (OUTPATIENT)
Dept: ULTRASOUND IMAGING | Facility: CLINIC | Age: 38
Discharge: HOME | End: 2023-12-13
Payer: COMMERCIAL

## 2023-12-13 VITALS — HEART RATE: 70 BPM | DIASTOLIC BLOOD PRESSURE: 86 MMHG | SYSTOLIC BLOOD PRESSURE: 127 MMHG

## 2023-12-13 DIAGNOSIS — R92.8 ABNORMAL MAMMOGRAM: ICD-10-CM

## 2023-12-13 PROCEDURE — 88305 TISSUE EXAM BY PATHOLOGIST: CPT | Performed by: PATHOLOGY

## 2023-12-13 PROCEDURE — A4648 IMPLANTABLE TISSUE MARKER: HCPCS

## 2023-12-13 PROCEDURE — 19083 BX BREAST 1ST LESION US IMAG: CPT

## 2023-12-13 RX ORDER — LIDOCAINE HYDROCHLORIDE 10 MG/ML
5 INJECTION, SOLUTION EPIDURAL; INFILTRATION; INTRACAUDAL; PERINEURAL ONCE
Status: COMPLETED | OUTPATIENT
Start: 2023-12-13 | End: 2023-12-13

## 2023-12-13 RX ADMIN — LIDOCAINE HYDROCHLORIDE 5 ML: 10 INJECTION, SOLUTION EPIDURAL; INFILTRATION; INTRACAUDAL; PERINEURAL at 10:42

## 2023-12-13 NOTE — PROGRESS NOTES
Ice pack given:    ___x__yes _____no        Discharge instructions given to patient:    __x___yes _____no    Discharged via:    ___x__ambulatory    _____wheelchair    _____stretcher    Stable on discharge:    ___x__yes ____no    Band aid clean, dry and intact.

## 2023-12-13 NOTE — PROGRESS NOTES
Procedure type:    ___x__ultrasound guided _____stereotactic    Breast:    __x___Left _____Right    Location: Left breast 900 2cmfn    Needle:12G    # of passes: 5    Clip: Hydromark Butterfly    Performed by: Dr. Sebastian Farley held for 5 minutes by: Leila Guevara RN    Steri Strips:    ___x__yes _____no    Band aid:    ___x__yes_____no    Tape and guaze:    _____yes __x___no    Tolerated procedure:    __x___yes _____no

## 2023-12-14 ENCOUNTER — TELEPHONE (OUTPATIENT)
Dept: MAMMOGRAPHY | Facility: CLINIC | Age: 38
End: 2023-12-14

## 2023-12-14 ENCOUNTER — CONSULT (OUTPATIENT)
Dept: SURGICAL ONCOLOGY | Facility: CLINIC | Age: 38
End: 2023-12-14
Payer: COMMERCIAL

## 2023-12-14 VITALS
WEIGHT: 280.4 LBS | HEIGHT: 67 IN | SYSTOLIC BLOOD PRESSURE: 124 MMHG | OXYGEN SATURATION: 98 % | HEART RATE: 80 BPM | TEMPERATURE: 98.8 F | DIASTOLIC BLOOD PRESSURE: 70 MMHG | BODY MASS INDEX: 44.01 KG/M2

## 2023-12-14 DIAGNOSIS — D22.9 NEVUS, ATYPICAL: ICD-10-CM

## 2023-12-14 DIAGNOSIS — C43.62 MALIGNANT MELANOMA OF LEFT UPPER EXTREMITY INCLUDING SHOULDER (HCC): Primary | ICD-10-CM

## 2023-12-14 DIAGNOSIS — D17.22 LIPOMA OF LEFT UPPER EXTREMITY: ICD-10-CM

## 2023-12-14 PROCEDURE — 99244 OFF/OP CNSLTJ NEW/EST MOD 40: CPT | Performed by: STUDENT IN AN ORGANIZED HEALTH CARE EDUCATION/TRAINING PROGRAM

## 2023-12-14 PROCEDURE — 88305 TISSUE EXAM BY PATHOLOGIST: CPT | Performed by: PATHOLOGY

## 2023-12-14 RX ORDER — TRAMADOL HYDROCHLORIDE 50 MG/1
50 TABLET ORAL EVERY 6 HOURS PRN
Qty: 10 TABLET | Refills: 0 | Status: SHIPPED | OUTPATIENT
Start: 2023-12-14

## 2023-12-14 NOTE — PROGRESS NOTES
Surgical Oncology Consultation    [address] [facility] CANCER [facility] SURGICAL ONCOLOGY Gladies Rebecca  [address] [facility],[address] [place] [address]  [phone]    Patient:  Petra Arriaga  1985  [id number]    Primary Care provider:  Renan Wright DO  [address] [address]    Referring provider: Jana Seay MD  [address] [place] [address] [address]    Diagnoses and all orders for this visit:    Malignant melanoma of left upper extremity including shoulder ([address])    Nevus, atypical  -     Ambulatory Referral to Surgical Oncology        Chief Complaint   Patient presents with    Consult       No follow-ups on file. Oncology History   Malignant melanoma of left upper extremity including shoulder ([address])   5/18/2023 Biopsy    Left upper arm, shave biopsy   Thickness 1.2mm  1 Mitoses   No ulceration        9/28/2023 -  Cancer Staged    Staging form: Melanoma of the Skin, AJCC 8th Edition  - Clinical stage from 9/28/2023: Stage IB (cT2a, cN0, cM0) - Signed by Mary Kate Loya MD on 12/7/2023 11/22/2023 Initial Diagnosis    Malignant melanoma of left upper extremity including shoulder (HCC)         History of Present Illness  :   40 yo female with T2a eliazar of left upper arm. Presented to derm for eval of LFT upper arm lipoma. An atypical appearing skin lesion was also noted that the patient felt was present for many years and had been biopsied almost a decade ago. Shave bx was performed demonstrating MELTUMP vs melanoma with expert consult eventually determining this to be a T2a 1.2 mm eliazar. Pt is asx with no other lumps or bumps of note. No systemic sx of bone pain, HA, dizziness, fatigue, weight loss. Dark hair but fair skin with hx of blistering sunburns and tanning bed use. Review of Systems  Complete ROS Surg Onc:   Constitutional: The patient denies new or recent history of general fatigue, no recent weight loss, no change in appetite.    Eyes: No complaints of visual problems, no scleral icterus. ENT: No complaints of ear pain, no hoarseness, no difficulty swallowing,  no tinnitus and no new masses in head, oral cavity, or neck. Cardiovascular: No complaints of chest pain, no palpitations, no ankle edema. Respiratory: No complaints of shortness of breath, no cough. Gastrointestinal: No complaints of jaundice, no bloody stools, no pale stools. Genitourinary: No complaints of dysuria, no hematuria, no nocturia, no frequent urination, no urethral discharge. Musculoskeletal: No complaints of weakness, paralysis, joint stiffness or arthralgias. Integumentary: No complaints of rash, no new lesions. Neurological: No complaints of convulsions, no seizures, no dizziness. Hematologic/Lymphatic: No complaints of easy bruising. Endocrine:  No hot or cold intolerance. No polydipsia, polyphagia, or polyuria. Allergy/immunology:  No environmental allergies. No food allergies. Not immunocompromised.       Patient Active Problem List   Diagnosis    Allergic rhinitis    Moderate persistent asthma    Fatigue    Migraine without status migrainosus, not intractable    Seasonal affective disorder (HCC)    Hyperglycemia    Dyslipidemia    Morbid obesity with BMI of 40.0-44.9, adult (HCC)    Mass of left breast    Back skin lesion    Malignant melanoma of left upper extremity including shoulder (HCC)     Past Medical History:   Diagnosis Date    Asthma     had vaccine     Diabetes mellitus ([address])     gestational DM diet controlled    Infertility 02/03/2017    only got  sperm tested, naturally conceived     Lumbar strain 07/16/2015    Melanoma ([address])     Punch Bx left upper extremity    Varicella     chicken pox as child    Visual impairment     eyewear      Past Surgical History:   Procedure Laterality Date    FOOT SURGERY      US GUIDED BREAST BIOPSY LEFT COMPLETE Left 12/13/2023    WISDOM TOOTH EXTRACTION       Family History   Problem Relation Age of Onset    Multiple sclerosis Mother     Skin cancer Mother     Cancer Mother     Other Mother         Myocardial bridge    Diabetes Father     Other Father         Pericarditis    Miscarriages / Stillbirths Sister     Asthma Maternal Grandmother     Diabetes Paternal Grandmother     No Known Problems Son     Learning disabilities Other     No Known Problems Paternal Aunt     No Known Problems Maternal Aunt     Breast cancer Neg Hx     Colon cancer Neg Hx     Ovarian cancer Neg Hx     Uterine cancer Neg Hx     Cervical cancer Neg Hx      Social History     Socioeconomic History    Marital status: /Civil Union     Spouse name: Not on file    Number of children: Not on file    Years of education: Not on file    Highest education level: Not on file   Occupational History    Occupation: [employer]     Employer: [employer]   Tobacco Use    Smoking status: Former     Current packs/day: 0.00     Average packs/day: 0.5 packs/day for 12.5 years (6.3 ttl pk-yrs)     Types: Cigarettes     Start date:      Quit date: 2017     Years since quittin.4    Smokeless tobacco: Never   Vaping Use    Vaping status: Never Used   Substance and Sexual Activity    Alcohol use: Not Currently     Comment: barley    Drug use: No    Sexual activity: Yes     Partners: Male     Birth control/protection: None     Comment: withdrawl   Other Topics Concern    Not on file   Social History Narrative    Caffeine use     Social Determinants of Health     Financial Resource Strain: Not on file   Food Insecurity: Not on file   Transportation Needs: Not on file   Physical Activity: Not on file   Stress: Not on file   Social Connections: Not on file   Intimate Partner Violence: Not on file   Housing Stability: Not on file       Current Outpatient Medications:     albuterol (Ventolin HFA) 90 mcg/act inhaler, Inhale 2 puffs every 4 (four) hours as needed for wheezing or shortness of breath, Disp: 18 g, Rfl: 0    clindamycin (CLEOCIN T) 1 % lotion, Apply topically 2 (two) times a day, Disp: 60 mL, Rfl: 3    metFORMIN (GLUCOPHAGE) 500 mg tablet, Take one pill for one month. If tolerating well, increase to two pills daily. Limit alcohol while on this medication. , Disp: 60 tablet, Rfl: 4    spironolactone (ALDACTONE) 50 mg tablet, Take 1 pill (50 mg) once daily for two weeks. Take with large glass of water. If well-tolerated, increase to 2 pills (100 mg) daily. Avoid large amounts of high potassium foods while on this medication. , Disp: 60 tablet, Rfl: 3  Allergies   Allergen Reactions    Sulfa Antibiotics Other (See Comments)     Pt does not know what happens when she takes was a child when had the allergy       Vitals:    12/14/23 1111   BP: 124/70   Pulse: 80   Temp: 98.8 °F (37.1 °C)   SpO2: 98%       Physical Exam   General: Appears well, appears stated age  Skin: Warm, anicteric.  LUE eliazar scar epresent  HEENT: Normocephalic, atraumatic; sclera aniceteric, mucous membranes moist; cervical nodes without adenopathy  Cardiopulmonary: RRR, Easy WOB, no BLE edema  Abd: Flat and soft, nontender, no masses appreciated, no hepatosplenomegaly  MSK: Symmetric, no cyanosis, no overt weakness  Lymphatic: No cervical, axillary or inguinal lymphadenopathy  Neuro: Affect appropriate, no gross motor abnormalities        Pathology:  Synoptic report for melanoma of the skin  Thickness:  at least 1.2 mm (lesion extends to deep specimen margin)  Ulceration: not seen  Anatomic Hamilton Doran) level: at least IV  Type: nevoid melanoma  Mitoses: 1/mm2  Microsatellites: not seen  Lymphovascular invasion: not seen  Neurotropism: not seen  Tumor regression: not seen  Tumor-infiltrating lymphocytes (TIL): present, non-brisk  Margin assessment: melanoma extends to deep and peripheral specimen margins  Pathologic stage: at least pT2a  Associated nevus: not seen       Labs: Reviewed in [facility]    Imaging  US guided breast biopsy left complete, Mammo post biopsy left    Result Date: 12/13/2023  Narrative: DIAGNOSIS: Abnormal mammogram INDICATION: Jennie Adkins is a 41 y.o. female presenting for left breast biopsy. Patient has a personal history of left upper extremity melanoma. Prior to the procedure, previous imaging was reviewed. The procedure was explained to the patient in detail. All questions were answered. Written and verbal informed consent was obtained. FINDINGS: LEFT 1) MASS [A] in the left breast at 9:00, 2 cm from the nipple: The left breast was marked. Limited ultrasound was performed with grayscale and color imaging. The biopsy site was marked. A time-out was performed. The skin was prepped in the usual fashion. Anesthesia was administered with 5 mL of lidocaine 1%. An 11-gauge introducer was placed A 12 gauge spring-loaded biopsy device was used to obtain 5 samples under direct ultrasound guidance. A HydroMARK butterfly clip was placed in the biopsy cavity under direct ultrasound guidance. Hemostasis was obtained with direct pressure on the biopsy site. The patient had minimal bleeding. The patient tolerated the procedure well. There were no immediate complications. Post biopsy mammogram: Left CC and lateral views were obtained (true 2D and tomosynthesis). The HydroMARK butterfly clip projects within the mass. Impression:  Technically successful ultrasound-guided core needle biopsy of a mass in the left breast at 9:00, 2 cm from the nipple, marked with a HydroMARK butterfly clip. Management recommendations will be made once the pathology results are available. RECOMMENDATION:      - Waiting for pathology for the left breast. Workstation ID: [id number]    US breast left limited (diagnostic)    Result Date: 11/28/2023  Narrative: DIAGNOSIS: Abnormal mammogram TECHNIQUE: Ultrasound of the left breast(s) was performed.  COMPARISONS: Prior breast imaging dated: 05/24/2023 and 05/24/2023 RELEVANT HISTORY: Family Breast Cancer History: History of breast cancer in Neg Hx. Family Medical History: No known relevant family medical history. Personal History: Hormone history includes birth control. No known relevant surgical history. No known relevant medical history. RISK ASSESSMENT: 5 Year Tyrer-Cuzick: 0.53 % 10 Year Tyrer-Cuzick: 1.43 % Lifetime Tyrer-Cuzick: 15.27 % INDICATION: Victorino Cohen is a 41 y.o. female presenting for follow up birads 3 mammo/US of left breast. FINDINGS: LEFT 1) MASS [A]: There is an 11 mm x 6 mm x 9 mm oval, parallel, hypoechoic mass with circumscribed margins seen in the left breast at 9 o'clock in the anterior depth. This nodule is showing very mild interval increase in size. While many of his features continue to suggest a benign etiology. Given the patient's recent diagnosis of potential melanoma in the left upper extremity, suggest ultrasound-guided core biopsy sampling to confirm the nature of this nodule. Impression:  Mild interval increase in size. Recommend ultrasound-guided core biopsy. These findings and recommendations were personally discussed with the patient in conjunction with our nurse navigator. ASSESSMENT/BI-RADS CATEGORY: Left: 4 - Suspicious Overall: 4 - Suspicious RECOMMENDATION:      - Ultrasound-guided breast biopsy for the left breast. Workstation ID: [id number]      I independently reviewed and interpreted the above laboratory and imaging data incl derm notes, path. Discussed with Dr Manuel Mariscal and at [address] [place] [address]      Discussion/Summary:   40 yo female with new diagnosis of T2a melanoma of left upper arm. Discussed diagnosis of melanoma and explained that the patient has an intermediate thickness melanoma for which a wide local excision and sentinel lymph node biopsy is recommended. Discussed that any additional therapy will be guided by the final pathology and results of the LN bx. Discussed that sun protection is best prevention for melanoma and discussed ongoing sun protection.  Reinforced need for continued skin surveillance with dermatology, which the patient has been very diligent about. Risks, benefits, alternatives and prognosis discussed in full to include bleeding, infection, wound healing complications, need for re-excision, seroma, and pt expresses understanding and endorsement. Will proceed with WLE + SLN bx. She also had a LUE lipoma near this lesion that she would like removed. Will perform lipoma excision concurrently with similar risks.

## 2023-12-14 NOTE — H&P (VIEW-ONLY)
Surgical Oncology Consultation    [facility] [place]  [address] [address] [phone]    Patient:  Sandrita Herron  1985  [id number]    Primary Care provider:  Du Washburn DO  [address]  [facility] [place] [address]    Referring provider:  Taylor Hart MD  [address] [place] [address]    Diagnoses and all orders for this visit:    Malignant melanoma of left upper extremity including shoulder (HCC)    Nevus, atypical  -     Ambulatory Referral to Surgical Oncology        Chief Complaint   Patient presents with    Consult       No follow-ups on file.    Oncology History   Malignant melanoma of left upper extremity including shoulder (HCC)   5/18/2023 Biopsy    Left upper arm, shave biopsy   Thickness 1.2mm  1 Mitoses   No ulceration        9/28/2023 -  Cancer Staged    Staging form: Melanoma of the Skin, AJCC 8th Edition  - Clinical stage from 9/28/2023: Stage IB (cT2a, cN0, cM0) - Signed by Alaina Clarke MD on 12/7/2023 11/22/2023 Initial Diagnosis    Malignant melanoma of left upper extremity including shoulder (HCC)         History of Present Illness  :   39 yo female with T2a eliazar of left upper arm. Presented to derm for eval of LFT upper arm lipoma. An atypical appearing skin lesion was also noted that the patient felt was present for many years and had been biopsied almost a decade ago. Shave bx was performed demonstrating MELTUMP vs melanoma with expert consult eventually determining this to be a T2a 1.2 mm eliazra. Pt is asx with no other lumps or bumps of note. No systemic sx of bone pain, HA, dizziness, fatigue, weight loss. Dark hair but fair skin with hx of blistering sunburns and tanning bed use.     Review of Systems  Complete ROS Surg Onc:   Constitutional: The patient denies new or recent history of general fatigue, no recent weight loss, no change in appetite.   Eyes: No  complaints of visual problems, no scleral icterus.   ENT: No complaints of ear pain, no hoarseness, no difficulty swallowing,  no tinnitus and no new masses in head, oral cavity, or neck.   Cardiovascular: No complaints of chest pain, no palpitations, no ankle edema.   Respiratory: No complaints of shortness of breath, no cough.   Gastrointestinal: No complaints of jaundice, no bloody stools, no pale stools.   Genitourinary: No complaints of dysuria, no hematuria, no nocturia, no frequent urination, no urethral discharge.   Musculoskeletal: No complaints of weakness, paralysis, joint stiffness or arthralgias.  Integumentary: No complaints of rash, no new lesions.   Neurological: No complaints of convulsions, no seizures, no dizziness.   Hematologic/Lymphatic: No complaints of easy bruising.   Endocrine:  No hot or cold intolerance.  No polydipsia, polyphagia, or polyuria.  Allergy/immunology:  No environmental allergies.  No food allergies.  Not immunocompromised.      Patient Active Problem List   Diagnosis    Allergic rhinitis    Moderate persistent asthma    Fatigue    Migraine without status migrainosus, not intractable    Seasonal affective disorder (HCC)    Hyperglycemia    Dyslipidemia    Morbid obesity with BMI of 40.0-44.9, adult (HCC)    Mass of left breast    Back skin lesion    Malignant melanoma of left upper extremity including shoulder (HCC)     Past Medical History:   Diagnosis Date    Asthma     had vaccine     Diabetes mellitus (HCC)     gestational DM diet controlled    Infertility 02/03/2017    only got  sperm tested, naturally conceived     Lumbar strain 07/16/2015    Melanoma (HCC)     Punch Bx left upper extremity    Varicella     chicken pox as child    Visual impairment     eyewear      Past Surgical History:   Procedure Laterality Date    FOOT SURGERY      US GUIDED BREAST BIOPSY LEFT COMPLETE Left 12/13/2023    WISDOM TOOTH EXTRACTION       Family History   Problem Relation Age of  Onset    Multiple sclerosis Mother     Skin cancer Mother     Cancer Mother     Other Mother         Myocardial bridge    Diabetes Father     Other Father         Pericarditis    Miscarriages / Stillbirths Sister     Asthma Maternal Grandmother     Diabetes Paternal Grandmother     No Known Problems Son     Learning disabilities Other     No Known Problems Paternal Aunt     No Known Problems Maternal Aunt     Breast cancer Neg Hx     Colon cancer Neg Hx     Ovarian cancer Neg Hx     Uterine cancer Neg Hx     Cervical cancer Neg Hx      Social History     Socioeconomic History    Marital status: /Civil Union     Spouse name: Not on file    Number of children: Not on file    Years of education: Not on file    Highest education level: Not on file   Occupational History    Occupation: [employer]     Employer: [employer]   Tobacco Use    Smoking status: Former     Current packs/day: 0.00     Average packs/day: 0.5 packs/day for 12.5 years (6.3 ttl pk-yrs)     Types: Cigarettes     Start date:      Quit date: 2017     Years since quittin.4    Smokeless tobacco: Never   Vaping Use    Vaping status: Never Used   Substance and Sexual Activity    Alcohol use: Not Currently     Comment: barley    Drug use: No    Sexual activity: Yes     Partners: Male     Birth control/protection: None     Comment: withdrawl   Other Topics Concern    Not on file   Social History Narrative    Caffeine use     Social Determinants of Health     Financial Resource Strain: Not on file   Food Insecurity: Not on file   Transportation Needs: Not on file   Physical Activity: Not on file   Stress: Not on file   Social Connections: Not on file   Intimate Partner Violence: Not on file   Housing Stability: Not on file       Current Outpatient Medications:     albuterol (Ventolin HFA) 90 mcg/act inhaler, Inhale 2 puffs every 4 (four) hours as needed for wheezing or shortness of breath, Disp: 18 g, Rfl: 0    clindamycin (CLEOCIN T)  1 % lotion, Apply topically 2 (two) times a day, Disp: 60 mL, Rfl: 3    metFORMIN (GLUCOPHAGE) 500 mg tablet, Take one pill for one month. If tolerating well, increase to two pills daily. Limit alcohol while on this medication., Disp: 60 tablet, Rfl: 4    spironolactone (ALDACTONE) 50 mg tablet, Take 1 pill (50 mg) once daily for two weeks. Take with large glass of water. If well-tolerated, increase to 2 pills (100 mg) daily. Avoid large amounts of high potassium foods while on this medication., Disp: 60 tablet, Rfl: 3  Allergies   Allergen Reactions    Sulfa Antibiotics Other (See Comments)     Pt does not know what happens when she takes was a child when had the allergy       Vitals:    12/14/23 1111   BP: 124/70   Pulse: 80   Temp: 98.8 °F (37.1 °C)   SpO2: 98%       Physical Exam   General: Appears well, appears stated age  Skin: Warm, anicteric. LUE eliazar scar epresent  HEENT: Normocephalic, atraumatic; sclera aniceteric, mucous membranes moist; cervical nodes without adenopathy  Cardiopulmonary: RRR, Easy WOB, no BLE edema  Abd: Flat and soft, nontender, no masses appreciated, no hepatosplenomegaly  MSK: Symmetric, no cyanosis, no overt weakness  Lymphatic: No cervical, axillary or inguinal lymphadenopathy  Neuro: Affect appropriate, no gross motor abnormalities        Pathology:  Synoptic report for melanoma of the skin  Thickness:  at least 1.2 mm (lesion extends to deep specimen margin)  Ulceration: not seen  Anatomic (Larry) level: at least IV  Type: nevoid melanoma  Mitoses: 1/mm2  Microsatellites: not seen  Lymphovascular invasion: not seen  Neurotropism: not seen  Tumor regression: not seen  Tumor-infiltrating lymphocytes (TIL): present, non-brisk  Margin assessment: melanoma extends to deep and peripheral specimen margins  Pathologic stage: at least pT2a  Associated nevus: not seen       Labs: Reviewed in EPIC    Imaging  US guided breast biopsy left complete, Mammo post biopsy left    Result Date:  12/13/2023  Narrative: DIAGNOSIS: Abnormal mammogram INDICATION: Sandrita Herron is a 38 y.o. female presenting for left breast biopsy.  Patient has a personal history of left upper extremity melanoma. Prior to the procedure, previous imaging was reviewed.  The procedure was explained to the patient in detail.  All questions were answered.  Written and verbal informed consent was obtained. FINDINGS: LEFT 1) MASS [A] in the left breast at 9:00, 2 cm from the nipple: The left breast was marked.  Limited ultrasound was performed with grayscale and color imaging.  The biopsy site was marked.  A time-out was performed.  The skin was prepped in the usual fashion. Anesthesia was administered with 5 mL of lidocaine 1%.  An 11-gauge introducer was placed A 12 gauge spring-loaded biopsy device was used to obtain 5 samples under direct ultrasound guidance.  A HydroMARK butterfly clip was placed in the biopsy cavity under direct ultrasound guidance.  Hemostasis was obtained with direct pressure on the biopsy site.  The patient had minimal bleeding.  The patient tolerated the procedure well. There were no immediate complications.   Post biopsy mammogram: Left CC and lateral views were obtained (true 2D and tomosynthesis).  The HydroMARK butterfly clip projects within the mass.     Impression:  Technically successful ultrasound-guided core needle biopsy of a mass in the left breast at 9:00, 2 cm from the nipple, marked with a HydroMARK butterfly clip. Management recommendations will be made once the pathology results are available. RECOMMENDATION:      - Waiting for pathology for the left breast. Workstation ID: [id number]    US breast left limited (diagnostic)    Result Date: 11/28/2023  Narrative: DIAGNOSIS: Abnormal mammogram TECHNIQUE: Ultrasound of the left breast(s) was performed. COMPARISONS: Prior breast imaging dated: 05/24/2023 and 05/24/2023 RELEVANT HISTORY: Family Breast Cancer History: History of breast cancer in  Neg Hx. Family Medical History: No known relevant family medical history. Personal History: Hormone history includes birth control. No known relevant surgical history. No known relevant medical history. RISK ASSESSMENT: 5 Year Tyrer-Cuzick: 0.53 % 10 Year Tyrer-Cuzick: 1.43 % Lifetime Tyrer-Cuzick: 15.27 % INDICATION: Sandrita Herron is a 38 y.o. female presenting for follow up birads 3 mammo/US of left breast. FINDINGS: LEFT 1) MASS [A]: There is an 11 mm x 6 mm x 9 mm oval, parallel, hypoechoic mass with circumscribed margins seen in the left breast at 9 o'clock in the anterior depth.  This nodule is showing very mild interval increase in size.  While many of his features continue to suggest a benign etiology.  Given the patient's recent diagnosis of potential melanoma in the left upper extremity, suggest ultrasound-guided core biopsy sampling to confirm the nature of this nodule.     Impression:  Mild interval increase in size.  Recommend ultrasound-guided core biopsy.These findings and recommendations were personally discussed with the patient in conjunction with our nurse navigator. ASSESSMENT/BI-RADS CATEGORY: Left: 4 - Suspicious Overall: 4 - Suspicious RECOMMENDATION:      - Ultrasound-guided breast biopsy for the left breast. Workstation ID: [id number]      I independently reviewed and interpreted the above laboratory and imaging data incl derm notes, path. Discussed with Dr Clarke and at [facility]      Discussion/Summary:   39 yo female with new diagnosis of T2a melanoma of left upper arm. Discussed diagnosis of melanoma and explained that the patient has an intermediate thickness melanoma for which a wide local excision and sentinel lymph node biopsy is recommended. Discussed that any additional therapy will be guided by the final pathology and results of the LN bx. Discussed that sun protection is best prevention for melanoma and discussed ongoing sun protection. Reinforced need for continued skin  surveillance with dermatology, which the patient has been very diligent about. Risks, benefits, alternatives and prognosis discussed in full to include bleeding, infection, wound healing complications, need for re-excision, seroma, and pt expresses understanding and endorsement. Will proceed with WLE + SLN bx. She also had a LUE lipoma near this lesion that she would like removed. Will perform lipoma excision concurrently with similar risks.

## 2023-12-14 NOTE — TELEPHONE ENCOUNTER
Call placed to patient and she was given benign breast biopsy results. Advised of Radiologist's recommendation to begin routine screening mammogram at the age of 37 unless otherwise indicated. Discussed with patient she may want to discuss with her provider the benefit of having high risk screening due to the full pathology of the biopsy. Patient verbalized understanding and had no immediate questions. Pt has the breast care nurse navigator's contact information for any further needs.

## 2023-12-21 ENCOUNTER — PROCEDURE VISIT (OUTPATIENT)
Dept: DERMATOLOGY | Facility: CLINIC | Age: 38
End: 2023-12-21

## 2023-12-21 DIAGNOSIS — D22.9 NEVUS, ATYPICAL: Primary | ICD-10-CM

## 2023-12-21 NOTE — PROGRESS NOTES
"[facility] Dermatology Clinic Note     Patient Name: Sandrita Herron  Encounter Date: 12/21/2023     Have you been cared for by a [facility] Dermatologist in the last 3 years and, if so, which description applies to you?    Yes.  I have been here within the last 3 years, and my medical history has NOT changed since that time.  I am FEMALE/of child-bearing potential.    REVIEW OF SYSTEMS:  Have you recently had or currently have any of the following? No changes in my recent health.   PAST MEDICAL HISTORY:  Have you personally ever had or currently have any of the following?  If \"YES,\" then please provide more detail. No changes in my medical history.   HISTORY OF IMMUNOSUPPRESSION: Do you have a history of any of the following:  Systemic Immunosuppression such as Diabetes, Biologic or Immunotherapy, Chemotherapy, Organ Transplantation, Bone Marrow Transplantation?  No     Answering \"YES\" requires the addition of the dotphrase \"IMMUNOSUPPRESSED\" as the first diagnosis of the patient's visit.   FAMILY HISTORY:  Any \"first degree relatives\" (parent, brother, sister, or child) with the following?    No changes in my family's known health.   PATIENT EXPERIENCE:    Do you want the Dermatologist to perform a COMPLETE skin exam today including a clinical examination under the \"bra and underwear\" areas?  NO  If necessary, do we have your permission to call and leave a detailed message on your Preferred Phone number that includes your specific medical information?  Yes      Allergies   Allergen Reactions    Sulfa Antibiotics Other (See Comments)     Pt does not know what happens when she takes was a child when had the allergy      Current Outpatient Medications:     albuterol (Ventolin HFA) 90 mcg/act inhaler, Inhale 2 puffs every 4 (four) hours as needed for wheezing or shortness of breath, Disp: 18 g, Rfl: 0    clindamycin (CLEOCIN T) 1 % lotion, Apply topically 2 (two) times a day, Disp: 60 mL, Rfl: 3    metFORMIN " (GLUCOPHAGE) 500 mg tablet, Take one pill for one month. If tolerating well, increase to two pills daily. Limit alcohol while on this medication., Disp: 60 tablet, Rfl: 4    spironolactone (ALDACTONE) 50 mg tablet, Take 1 pill (50 mg) once daily for two weeks. Take with large glass of water. If well-tolerated, increase to 2 pills (100 mg) daily. Avoid large amounts of high potassium foods while on this medication., Disp: 60 tablet, Rfl: 3    traMADol (Ultram) 50 mg tablet, Take 1 tablet (50 mg total) by mouth every 6 (six) hours as needed for moderate pain, Disp: 10 tablet, Rfl: 0          Whom besides the patient is providing clinical information about today's encounter?   NO ADDITIONAL HISTORIAN (patient alone provided history)    Physical Exam and Assessment/Plan by Diagnosis:         Component    Case Report   Surgical Pathology Report                         Case: [id number]                                    Authorizing Provider:  Mitchell Bull MD Collected:           05/18/2023 [facility]               Ordering Location:     [facility]      Received:            05/18/2023 [address] [place] [address]                                                                 Pathologist:           Shaheen Perla MD                                                            Specimens:   A) - Skin, Other, Specimen A: Right Arm                                                              B) - Skin, Other, Specimen B: Back                                                          Addendum 2   For specimen A, TERT promoter mutation analysis was attempted, but adequate amplification products for analysis were not able to obtained ([employer] accession #: [id number]). The diagnosis remains unchanged.    Addendum electronically signed by Shaheen Perla MD on 7/11/2023 at  6:17 PM   Addendum   For specimen A, TERT promoter mutation analysis was attempted,, but adequate amplification  products for analysis were not able to obtained ([employer] accession #: [id number]). The diagnosis remains unchanged.    Addendum electronically signed by Shaheen Perla MD on 6/8/2023 at  5:07 PM   Final Diagnosis   A. Skin, right upper arm, shave biopsy:     Atypical dermal spitzoid melanocytic proliferation; extending focally to the tissue edge (see note).     Note:  The histopathologic findings are compatible with DERMAL SPITZ NEVUS. Excision is recommended to ensure against local persistence/recurrence. The lesional cells are positive for SOX10 and MART-1, and they are negative for HMB45. Expression of p16 is retained. UKANH323Y expression is not present. Only focal, weak expression of PRAME is seen. Fluorescence in situ hybridization (FISH) analysis performed at [employer] using a 4-probe panel (6p25, 9p21/CEN9, 8q24, and 11q13) detected FISH signals within normal limits (accession #: [id number]). The results of TERT promoter mutational analysis will be reported as an addendum.              B. Skin, back, shave biopsy:     Lentiginous compound dysplastic nevus with moderate to focally severe cytologic atypia; extending to the tissue edges (see note).     Note: SOX10, HMB45, and MART-1 immunostains were performed and reviewed.            PROCEDURE:  EXCISION WITH INTERMEDIATE LAYERED CLOSURE     Attending: Dr.Braden Hansen     Assistant:  Geraldine Valdes    Pre-Op Diagnosis: Specimen A Atypical dermal spitzoid ; Specimen B;Lentiginous compound dysplastic nevus with moderate to focally severe cytologic atypia   Post-Op Diagnosis: Same   Benign versus Malignant: Atypical       Lesion Anatomic Location: Right Upper arm and Back (Previous Accession Number: [id number]   Pre-op size: Specimen A; Right upper arm 0.9cm Specimen B; Back 1.5 x 0.8 cm  Size of defect:  Specimen A; 1.9  Specimen B; 2.5 x 1.8 cm (with 5 centimeter margins)   Final repaired wound length: Specimen A; 4.9 cm Specimen B; 5.5.  cm    Written and verbal, witnessed informed consent was obtained. I discussed that excision is a method of removing lesions both benign and malignant lesions.  A portion of normal skin is often taken to ensure completeness of removal.  I reviewed that procedure will include numbing the area,  cutting around and under defect, undermining tissue, and closing the wound with sutures both inside and out.  These sutures are usually removed in 7 to 14 days. Risks (bleeding, pain, infection, scarring, recurrence) and benefits discussed. It was discussed with patient that every effort is made to minimize scar, but scarring is influenced also by extrinsic factor such as location, age and genetics.     Time Out: performed:  yes  Correct patient: yes.   Correct site per Clinic Report: yes  Correct site per Patient Report: yes    LOCAL ANESTHESIA: 1% xylocaine with epi     DESCRIPTION OF PROCEDURE: The patient was brought back into the procedure room, anesthetized locally, prepped and draped in the usual fashion. Using a #15 blade with a scalpel, the lesion was excised in elliptical fashion. The wound was  undermined in the  fascial plane. Hemostasis was achieved with light electrocoagulation. Purpose of undermining was to decrease wound tension and facilitate closure.    The wound was closed with subcutaneous sutures as follows:    Deep suture: Specimen A; 4-0 Vicryl Specimen B: 3-0 Vicryl      Epidermal edge closure was accomplished with superficial sutures as follows:    Superficial suture: Specimen A; 4-0 Prolene Specimen B; 3-0 Prolene  Superficial suture type: Specimen A;simple running Specimen B;simple running     Estimated blood loss less than 3ml.    The patient tolerated the procedure well without any complications. The wound was cleaned with sterile saline, dried off, surgical vaseline ointment was applied, and the wound was covered. A pressure dressing was applied for stabilization and light pressure. The patient  was given detailed oral and written instructions on postoperative care as detailed in consent. The patient left in good medical condition.    POSTOP DISCUSSION DISCUSSION AND INSTRUCTIONS FOR PATIENT      Rationale for Procedure  A skin excision allows the dermatologist to remove a lesion. The procedure involves a local numbing medication and removing the entire lesion. Typically, the lesion is being removed because it is atypical, traumatized, or for significant appearance reasons.  The area will be open like a brush burn and allowed to heal.   There will be no sutures.Tissue is sent to pathologist who will reconfirm diagnosis and verify completeness of lesion removal.    Description of Procedure  We would like to perform a skin excision today.  A local anesthetic, similar to the kind that a dentist uses when filling a cavity, will be injected with a very small needle into the skin area to be sampled.  The injected skin and tissue underneath should “go to sleep” and become numbed so that no further pain should be felt.  A scalpel will be used to cut around the lesion and tissue will be submitted to pathology for examination.  Depending on the diagnosis the lesion will be excised with a certain amount of normal skin to help assure completeness of lesion removal.  The physician will discuss in advance the anticipated size and extent of removal.   Bleeding will occur, but it will stopped with direct pressure, sutures, and electrocautery.    Surgical “Vaseline-type” ointment will also applied after the procedure to help create a barrier between the wound and the outside world.      Risks and Potential Complications  The advantage of a skin excision is that it allows us to remove a problem lesion quickly.  Although this usually permits the lesion to heal as soon as possible with the least scarring, there are some risks and potential complications that include but are not limited to the following:  Some bleeding is  normal at time of procedure and some bleeding on gauze is normal  the first few days after surgery.  Profuse bleeding and bleeding with swelling and pain should be reported as detailed  below  Infection is uncommon in skin surgery.  Infection should be reported and is indicated by pain, redness, and discharge of purulent material.  Some dull to at time sharp pain could occur initially the day after surgery.  Persistent pain or increasing pain days after surgery is not expected and should be reported.  Every effort is made to minimize scar, but location, size, and genetics do play a role in scar appearance.  A surgical wound does not achieve its optimal appearance until 6 months.  There are several treatments available if scarring would be problematic including scar creams, silicone pad, laser and scar revision.  Skin discoloration can occur especially in people of color.  Its important to avoid sun on wound in first 6 months after surgery.  Treatment is available if pigment is problematic.  Incomplete removal of the lesion or recurrence of lesion can occur and this would then require further treatment and more surgery.  Nerve Damage/Numbness/Loss of Function is very rare, but is most likely to occur if lesion is large or if it is in a high risk location  Allergic Reaction to lidocaine is rare.  More commonly,  epinephrine is used  with the lidocaine.  Occasionally, epinephrine (aka adrenalin) may cause a brief  feeling of anxiety or jitteriness.  The person at the microscope  (pathologist) may provide additional information that was unexpected. This unexpected finding could provoke the need for additional treatment or evaluation.    What You Will Need to Do After the Procedure  Keep the area clean and dry the first day. Try NOT to remove the bandage for the first day.  Gently clean the area with soap and water and apply Vaseline ointment (this is over the counter and not a prescription) to the excision  site for up  to 2 weeks.    Apply a clean appropriately sized bandage to area.  Gauze and paper tape are recommended for sensitive skin.  Return for suture removal as instructed (generally 1 week for the face, 2 weeks for the body).  Take Acetaminophen (Tylenol) for discomfort, if no contraindications.  Do NOT take Ibuprofen or aspirin unless specifically told to do so by your Dermatologist because these medications can make bleeding worse.  Call our office immediately for signs of infection: fever, chills, increased redness, warmth, tenderness, discomfort/pain, or pus or foul smell coming from the wound.    If bleeding is noticed, place a clean cloth over the area and apply firm pressure for thirty minutes.  Check the wound ONLY after 30 minutes of direct pressure; do not cheat and sneak a peak, as that does not count.  If bleeding persists after 30 minutes of legitimate direct pressure, then try one more round of direct pressure for an additional 10 minutes to the area.  Should the bleeding become heavier or not stop after the second attempt, call [facility] Dermatology directly at [phone] (SKIN) or, if after hours, go to your local Emergency Room/Emergency Department.       Scribe Attestation      I,:  Geraldine Valdes MA am acting as a scribe while in the presence of the attending physician.:       I,:  Taylor Hart MD personally performed the services described in this documentation    as scribed in my presence.:

## 2023-12-21 NOTE — PATIENT INSTRUCTIONS
What You Will Need to Do After the Procedure  Keep the area clean and dry the first day. Try NOT to remove the bandage for the first day.  Gently clean the area with soap and water and apply Vaseline ointment (this is over the counter and not a prescription) to the excision  site for up to 2 weeks.    Apply a clean appropriately sized bandage to area.  Gauze and paper tape are recommended for sensitive skin.  Return for suture removal as instructed (generally 1 week for the face, 2 weeks for the body).  Take Acetaminophen (Tylenol) for discomfort, if no contraindications.  Do NOT take Ibuprofen or aspirin unless specifically told to do so by your Dermatologist because these medications can make bleeding worse.  Call our office immediately for signs of infection: fever, chills, increased redness, warmth, tenderness, discomfort/pain, or pus or foul smell coming from the wound.    If bleeding is noticed, place a clean cloth over the area and apply firm pressure for thirty minutes.  Check the wound ONLY after 30 minutes of direct pressure; do not cheat and sneak a peak, as that does not count.  If bleeding persists after 30 minutes of legitimate direct pressure, then try one more round of direct pressure for an additional 10 minutes to the area.  Should the bleeding become heavier or not stop after the second attempt, call [facility] Dermat[facility] directly at [phone] (SKIN) or, if after hours, go to your local Emergency Room/Emergency Department.

## 2024-01-03 ENCOUNTER — HOSPITAL ENCOUNTER (OUTPATIENT)
Dept: ULTRASOUND IMAGING | Facility: CLINIC | Age: 39
Discharge: HOME | End: 2024-01-03

## 2024-01-03 NOTE — PRE-PROCEDURE INSTRUCTIONS
Pre-Surgery Instructions:   Medication Instructions    albuterol (Ventolin HFA) 90 mcg/act inhaler Use day of surgery if needed and bring with you      clindamycin (CLEOCIN T) 1 % lotion Hold day of surgery      metFORMIN (GLUCOPHAGE) 500 mg tablet Hold day of surgery      spironolactone (ALDACTONE) 50 mg tablet Hold day of surgery      Medication instructions for day surgery reviewed. Please use only a sip of water to take your instructed medications. Avoid all over the counter vitamins, supplements and NSAIDS for one week prior to surgery per anesthesia guidelines. Tylenol is ok to take as needed.     You will receive a call one business day prior to surgery with an arrival time and hospital directions. If your surgery is scheduled on a Monday, the hospital will be calling you on the Friday prior to your surgery. If you have not heard from anyone by 8pm, please call the hospital supervisor through the hospital  at [phone]. (Misha [phone]).    Do not eat or drink anything after midnight the night before your surgery, including candy, mints, lifesavers, or chewing gum. Do not drink alcohol 24hrs before your surgery. Try not to smoke at least 24hrs before your surgery.       Follow the pre surgery showering instructions as listed in the “My Surgical Experience Booklet” or otherwise provided by your surgeon's office. Do not use a blade to shave the surgical area 1 week before surgery. It is okay to use a clean electric clippers up to 24 hours before surgery. Do not apply any lotions, creams, including makeup, cologne, deodorant, or perfumes after showering on the day of your surgery. Do not use dry shampoo, hair spray, hair gel, or any type of hair products.     No contact lenses, eye make-up, or artificial eyelashes. Remove nail polish, including gel polish, and any artificial, gel, or acrylic nails if possible. Remove all jewelry including rings and body piercing jewelry.     Wear causal  clothing that is easy to take on and off. Consider your type of surgery.    Keep any valuables, jewelry, piercings at home. Please bring any specially ordered equipment (sling, braces) if indicated.    Arrange for a responsible person to drive you to and from the hospital on the day of your surgery. Visitor Guidelines discussed.     Call the surgeon's office with any new illnesses, exposures, or additional questions prior to surgery.    Please reference your “My Surgical Experience Booklet” for additional information to prepare for your upcoming surgery.

## 2024-01-05 ENCOUNTER — HOSPITAL ENCOUNTER (OUTPATIENT)
Dept: RADIOLOGY | Facility: HOSPITAL | Age: 39
Discharge: HOME | End: 2024-01-05
Payer: COMMERCIAL

## 2024-01-05 ENCOUNTER — APPOINTMENT (OUTPATIENT)
Dept: LAB | Facility: HOSPITAL | Age: 39
End: 2024-01-05

## 2024-01-05 ENCOUNTER — APPOINTMENT (OUTPATIENT)
Dept: LAB | Facility: HOSPITAL | Age: 39
End: 2024-01-05
Payer: COMMERCIAL

## 2024-01-05 DIAGNOSIS — C43.62 MALIGNANT MELANOMA OF LEFT UPPER EXTREMITY INCLUDING SHOULDER (HCC): ICD-10-CM

## 2024-01-05 DIAGNOSIS — L98.9 BACK SKIN LESION: ICD-10-CM

## 2024-01-05 DIAGNOSIS — J45.41 MODERATE PERSISTENT ASTHMA WITH ACUTE EXACERBATION: ICD-10-CM

## 2024-01-05 DIAGNOSIS — D17.22 LIPOMA OF LEFT UPPER EXTREMITY: ICD-10-CM

## 2024-01-05 DIAGNOSIS — E66.01 MORBID OBESITY WITH BMI OF 40.0-44.9, ADULT (HCC): ICD-10-CM

## 2024-01-05 DIAGNOSIS — D22.9 NEVUS, ATYPICAL: ICD-10-CM

## 2024-01-05 DIAGNOSIS — N63.20 MASS OF LEFT BREAST, UNSPECIFIED QUADRANT: ICD-10-CM

## 2024-01-05 DIAGNOSIS — R73.9 HYPERGLYCEMIA: ICD-10-CM

## 2024-01-05 DIAGNOSIS — E78.5 DYSLIPIDEMIA: ICD-10-CM

## 2024-01-05 DIAGNOSIS — L98.9 SKIN LESION OF RIGHT ARM: ICD-10-CM

## 2024-01-05 LAB
ALBUMIN SERPL BCP-MCNC: 4.1 G/DL (ref 3.5–5)
ALP SERPL-CCNC: 59 U/L (ref 34–104)
ALT SERPL W P-5'-P-CCNC: 21 U/L (ref 7–52)
ANION GAP SERPL CALCULATED.3IONS-SCNC: 6 MMOL/L
AST SERPL W P-5'-P-CCNC: 13 U/L (ref 13–39)
ATRIAL RATE: 64 BPM
BASOPHILS # BLD AUTO: 0.03 THOUSANDS/ÂΜL (ref 0–0.1)
BASOPHILS NFR BLD AUTO: 1 % (ref 0–1)
BILIRUB SERPL-MCNC: 0.57 MG/DL (ref 0.2–1)
BILIRUB UR QL STRIP: NEGATIVE
BUN SERPL-MCNC: 13 MG/DL (ref 5–25)
CALCIUM SERPL-MCNC: 9 MG/DL (ref 8.4–10.2)
CHLORIDE SERPL-SCNC: 104 MMOL/L (ref 96–108)
CHOLEST SERPL-MCNC: 135 MG/DL
CLARITY UR: CLEAR
CO2 SERPL-SCNC: 26 MMOL/L (ref 21–32)
COLOR UR: NORMAL
CREAT SERPL-MCNC: 0.79 MG/DL (ref 0.6–1.3)
EOSINOPHIL # BLD AUTO: 0.18 THOUSAND/ÂΜL (ref 0–0.61)
EOSINOPHIL NFR BLD AUTO: 3 % (ref 0–6)
ERYTHROCYTE [DISTWIDTH] IN BLOOD BY AUTOMATED COUNT: 11.9 % (ref 11.6–15.1)
EST. AVERAGE GLUCOSE BLD GHB EST-MCNC: 134 MG/DL
GFR SERPL CREATININE-BSD FRML MDRD: 95 ML/MIN/1.73SQ M
GLUCOSE P FAST SERPL-MCNC: 113 MG/DL (ref 65–99)
GLUCOSE UR STRIP-MCNC: NEGATIVE MG/DL
HBA1C MFR BLD: 6.3 %
HCT VFR BLD AUTO: 37.9 % (ref 34.8–46.1)
HDLC SERPL-MCNC: 40 MG/DL
HGB BLD-MCNC: 12.3 G/DL (ref 11.5–15.4)
HGB UR QL STRIP.AUTO: NEGATIVE
IMM GRANULOCYTES # BLD AUTO: 0.04 THOUSAND/UL (ref 0–0.2)
IMM GRANULOCYTES NFR BLD AUTO: 1 % (ref 0–2)
INSULIN SERPL-ACNC: 20.99 UIU/ML
KETONES UR STRIP-MCNC: NEGATIVE MG/DL
LDH SERPL-CCNC: 144 U/L (ref 140–271)
LDLC SERPL CALC-MCNC: 71 MG/DL (ref 0–100)
LEUKOCYTE ESTERASE UR QL STRIP: NEGATIVE
LYMPHOCYTES # BLD AUTO: 1.79 THOUSANDS/ÂΜL (ref 0.6–4.47)
LYMPHOCYTES NFR BLD AUTO: 28 % (ref 14–44)
MCH RBC QN AUTO: 28.4 PG (ref 26.8–34.3)
MCHC RBC AUTO-ENTMCNC: 32.5 G/DL (ref 31.4–37.4)
MCV RBC AUTO: 88 FL (ref 82–98)
MONOCYTES # BLD AUTO: 0.41 THOUSAND/ÂΜL (ref 0.17–1.22)
MONOCYTES NFR BLD AUTO: 6 % (ref 4–12)
NEUTROPHILS # BLD AUTO: 4.06 THOUSANDS/ÂΜL (ref 1.85–7.62)
NEUTS SEG NFR BLD AUTO: 61 % (ref 43–75)
NITRITE UR QL STRIP: NEGATIVE
NRBC BLD AUTO-RTO: 0 /100 WBCS
P AXIS: 9 DEGREES
PH UR STRIP.AUTO: 5 [PH]
PLATELET # BLD AUTO: 274 THOUSANDS/UL (ref 149–390)
PMV BLD AUTO: 9.2 FL (ref 8.9–12.7)
POTASSIUM SERPL-SCNC: 4 MMOL/L (ref 3.5–5.3)
PR INTERVAL: 150 MS
PROT SERPL-MCNC: 6.9 G/DL (ref 6.4–8.4)
PROT UR STRIP-MCNC: NEGATIVE MG/DL
QRS AXIS: 35 DEGREES
QRSD INTERVAL: 92 MS
QT INTERVAL: 414 MS
QTC INTERVAL: 427 MS
RBC # BLD AUTO: 4.33 MILLION/UL (ref 3.81–5.12)
SODIUM SERPL-SCNC: 136 MMOL/L (ref 135–147)
SP GR UR STRIP.AUTO: 1.02 (ref 1–1.03)
T WAVE AXIS: 22 DEGREES
TRIGL SERPL-MCNC: 118 MG/DL
TSH SERPL DL<=0.05 MIU/L-ACNC: 2.52 UIU/ML (ref 0.45–4.5)
UROBILINOGEN UR STRIP-ACNC: <2 MG/DL
VENTRICULAR RATE: 64 BPM
WBC # BLD AUTO: 6.51 THOUSAND/UL (ref 4.31–10.16)

## 2024-01-05 PROCEDURE — 71046 X-RAY EXAM CHEST 2 VIEWS: CPT

## 2024-01-05 PROCEDURE — 80053 COMPREHEN METABOLIC PANEL: CPT

## 2024-01-05 PROCEDURE — 84443 ASSAY THYROID STIM HORMONE: CPT

## 2024-01-05 PROCEDURE — 83036 HEMOGLOBIN GLYCOSYLATED A1C: CPT

## 2024-01-05 PROCEDURE — 85025 COMPLETE CBC W/AUTO DIFF WBC: CPT

## 2024-01-05 PROCEDURE — 36415 COLL VENOUS BLD VENIPUNCTURE: CPT

## 2024-01-05 PROCEDURE — 83525 ASSAY OF INSULIN: CPT

## 2024-01-05 PROCEDURE — 83615 LACTATE (LD) (LDH) ENZYME: CPT

## 2024-01-05 PROCEDURE — 80061 LIPID PANEL: CPT

## 2024-01-05 PROCEDURE — 81003 URINALYSIS AUTO W/O SCOPE: CPT

## 2024-01-11 ENCOUNTER — ANESTHESIA EVENT (OUTPATIENT)
Dept: PERIOP | Facility: HOSPITAL | Age: 39
End: 2024-01-11
Payer: COMMERCIAL

## 2024-01-12 ENCOUNTER — HOSPITAL ENCOUNTER (OUTPATIENT)
Dept: NUCLEAR MEDICINE | Facility: HOSPITAL | Age: 39
Discharge: HOME | End: 2024-01-12
Attending: STUDENT IN AN ORGANIZED HEALTH CARE EDUCATION/TRAINING PROGRAM
Payer: COMMERCIAL

## 2024-01-12 ENCOUNTER — HOSPITAL ENCOUNTER (OUTPATIENT)
Facility: HOSPITAL | Age: 39
Setting detail: OUTPATIENT SURGERY
Discharge: HOME | End: 2024-01-12
Attending: STUDENT IN AN ORGANIZED HEALTH CARE EDUCATION/TRAINING PROGRAM | Admitting: STUDENT IN AN ORGANIZED HEALTH CARE EDUCATION/TRAINING PROGRAM
Payer: COMMERCIAL

## 2024-01-12 ENCOUNTER — ANESTHESIA (OUTPATIENT)
Dept: PERIOP | Facility: HOSPITAL | Age: 39
End: 2024-01-12
Payer: COMMERCIAL

## 2024-01-12 VITALS
OXYGEN SATURATION: 97 % | HEART RATE: 74 BPM | HEIGHT: 67 IN | DIASTOLIC BLOOD PRESSURE: 78 MMHG | RESPIRATION RATE: 15 BRPM | BODY MASS INDEX: 43.95 KG/M2 | TEMPERATURE: 98 F | WEIGHT: 280 LBS | SYSTOLIC BLOOD PRESSURE: 123 MMHG

## 2024-01-12 DIAGNOSIS — D17.22 LIPOMA OF LEFT UPPER EXTREMITY: ICD-10-CM

## 2024-01-12 DIAGNOSIS — C43.62 MALIGNANT MELANOMA OF LEFT UPPER EXTREMITY INCLUDING SHOULDER (HCC): ICD-10-CM

## 2024-01-12 DIAGNOSIS — D22.9 NEVUS, ATYPICAL: ICD-10-CM

## 2024-01-12 LAB
EXT PREGNANCY TEST URINE: NEGATIVE
EXT. CONTROL: NORMAL

## 2024-01-12 PROCEDURE — 81025 URINE PREGNANCY TEST: CPT | Performed by: STUDENT IN AN ORGANIZED HEALTH CARE EDUCATION/TRAINING PROGRAM

## 2024-01-12 PROCEDURE — G1004 CDSM NDSC: HCPCS

## 2024-01-12 PROCEDURE — 78195 LYMPH SYSTEM IMAGING: CPT

## 2024-01-12 PROCEDURE — 88341 IMHCHEM/IMCYTCHM EA ADD ANTB: CPT | Performed by: STUDENT IN AN ORGANIZED HEALTH CARE EDUCATION/TRAINING PROGRAM

## 2024-01-12 PROCEDURE — 11606 EXC TR-EXT MAL+MARG >4 CM: CPT | Performed by: STUDENT IN AN ORGANIZED HEALTH CARE EDUCATION/TRAINING PROGRAM

## 2024-01-12 PROCEDURE — 88307 TISSUE EXAM BY PATHOLOGIST: CPT | Performed by: STUDENT IN AN ORGANIZED HEALTH CARE EDUCATION/TRAINING PROGRAM

## 2024-01-12 PROCEDURE — 88304 TISSUE EXAM BY PATHOLOGIST: CPT | Performed by: STUDENT IN AN ORGANIZED HEALTH CARE EDUCATION/TRAINING PROGRAM

## 2024-01-12 PROCEDURE — 38900 IO MAP OF SENT LYMPH NODE: CPT | Performed by: STUDENT IN AN ORGANIZED HEALTH CARE EDUCATION/TRAINING PROGRAM

## 2024-01-12 PROCEDURE — 88342 IMHCHEM/IMCYTCHM 1ST ANTB: CPT | Performed by: STUDENT IN AN ORGANIZED HEALTH CARE EDUCATION/TRAINING PROGRAM

## 2024-01-12 PROCEDURE — 11406 EXC TR-EXT B9+MARG >4.0 CM: CPT | Performed by: STUDENT IN AN ORGANIZED HEALTH CARE EDUCATION/TRAINING PROGRAM

## 2024-01-12 PROCEDURE — 88305 TISSUE EXAM BY PATHOLOGIST: CPT | Performed by: STUDENT IN AN ORGANIZED HEALTH CARE EDUCATION/TRAINING PROGRAM

## 2024-01-12 PROCEDURE — 12035 INTMD RPR S/A/T/EXT 12.6-20: CPT | Performed by: STUDENT IN AN ORGANIZED HEALTH CARE EDUCATION/TRAINING PROGRAM

## 2024-01-12 PROCEDURE — 38525 BIOPSY/REMOVAL LYMPH NODES: CPT | Performed by: STUDENT IN AN ORGANIZED HEALTH CARE EDUCATION/TRAINING PROGRAM

## 2024-01-12 PROCEDURE — A9541 TC99M SULFUR COLLOID: HCPCS

## 2024-01-12 RX ORDER — FENTANYL CITRATE 50 UG/ML
INJECTION, SOLUTION INTRAMUSCULAR; INTRAVENOUS AS NEEDED
Status: DISCONTINUED | OUTPATIENT
Start: 2024-01-12 | End: 2024-01-12

## 2024-01-12 RX ORDER — MAGNESIUM HYDROXIDE 1200 MG/15ML
LIQUID ORAL AS NEEDED
Status: DISCONTINUED | OUTPATIENT
Start: 2024-01-12 | End: 2024-01-12 | Stop reason: HOSPADM

## 2024-01-12 RX ORDER — KETOROLAC TROMETHAMINE 30 MG/ML
INJECTION, SOLUTION INTRAMUSCULAR; INTRAVENOUS AS NEEDED
Status: DISCONTINUED | OUTPATIENT
Start: 2024-01-12 | End: 2024-01-12

## 2024-01-12 RX ORDER — FENTANYL CITRATE/PF 50 MCG/ML
25 SYRINGE (ML) INJECTION
Status: DISCONTINUED | OUTPATIENT
Start: 2024-01-12 | End: 2024-01-12 | Stop reason: HOSPADM

## 2024-01-12 RX ORDER — SODIUM CHLORIDE, SODIUM LACTATE, POTASSIUM CHLORIDE, CALCIUM CHLORIDE 600; 310; 30; 20 MG/100ML; MG/100ML; MG/100ML; MG/100ML
125 INJECTION, SOLUTION INTRAVENOUS CONTINUOUS
Status: DISCONTINUED | OUTPATIENT
Start: 2024-01-12 | End: 2024-01-12 | Stop reason: HOSPADM

## 2024-01-12 RX ORDER — SUCCINYLCHOLINE/SOD CL,ISO/PF 100 MG/5ML
SYRINGE (ML) INTRAVENOUS AS NEEDED
Status: DISCONTINUED | OUTPATIENT
Start: 2024-01-12 | End: 2024-01-12

## 2024-01-12 RX ORDER — MEPERIDINE HYDROCHLORIDE 25 MG/ML
12.5 INJECTION INTRAMUSCULAR; INTRAVENOUS; SUBCUTANEOUS ONCE
Status: DISCONTINUED | OUTPATIENT
Start: 2024-01-12 | End: 2024-01-12 | Stop reason: HOSPADM

## 2024-01-12 RX ORDER — ALBUTEROL SULFATE 2.5 MG/3ML
2.5 SOLUTION RESPIRATORY (INHALATION) ONCE AS NEEDED
Status: DISCONTINUED | OUTPATIENT
Start: 2024-01-12 | End: 2024-01-12 | Stop reason: HOSPADM

## 2024-01-12 RX ORDER — ONDANSETRON 2 MG/ML
INJECTION INTRAMUSCULAR; INTRAVENOUS AS NEEDED
Status: DISCONTINUED | OUTPATIENT
Start: 2024-01-12 | End: 2024-01-12

## 2024-01-12 RX ORDER — CEFAZOLIN SODIUM 2 G/50ML
2000 SOLUTION INTRAVENOUS ONCE
Status: COMPLETED | OUTPATIENT
Start: 2024-01-12 | End: 2024-01-12

## 2024-01-12 RX ORDER — CEFAZOLIN SODIUM 1 G/50ML
1000 SOLUTION INTRAVENOUS ONCE
Status: COMPLETED | OUTPATIENT
Start: 2024-01-12 | End: 2024-01-12

## 2024-01-12 RX ORDER — LABETALOL HYDROCHLORIDE 5 MG/ML
5 INJECTION, SOLUTION INTRAVENOUS
Status: DISCONTINUED | OUTPATIENT
Start: 2024-01-12 | End: 2024-01-12 | Stop reason: HOSPADM

## 2024-01-12 RX ORDER — TRAMADOL HYDROCHLORIDE 50 MG/1
50 TABLET ORAL EVERY 6 HOURS PRN
Status: DISCONTINUED | OUTPATIENT
Start: 2024-01-12 | End: 2024-01-12 | Stop reason: HOSPADM

## 2024-01-12 RX ORDER — HYDROMORPHONE HCL/PF 1 MG/ML
0.5 SYRINGE (ML) INJECTION
Status: DISCONTINUED | OUTPATIENT
Start: 2024-01-12 | End: 2024-01-12 | Stop reason: HOSPADM

## 2024-01-12 RX ORDER — PROPOFOL 10 MG/ML
INJECTION, EMULSION INTRAVENOUS AS NEEDED
Status: DISCONTINUED | OUTPATIENT
Start: 2024-01-12 | End: 2024-01-12

## 2024-01-12 RX ORDER — ONDANSETRON 2 MG/ML
4 INJECTION INTRAMUSCULAR; INTRAVENOUS ONCE AS NEEDED
Status: DISCONTINUED | OUTPATIENT
Start: 2024-01-12 | End: 2024-01-12 | Stop reason: HOSPADM

## 2024-01-12 RX ORDER — DEXAMETHASONE SODIUM PHOSPHATE 10 MG/ML
INJECTION, SOLUTION INTRAMUSCULAR; INTRAVENOUS AS NEEDED
Status: DISCONTINUED | OUTPATIENT
Start: 2024-01-12 | End: 2024-01-12

## 2024-01-12 RX ORDER — LIDOCAINE HYDROCHLORIDE 10 MG/ML
INJECTION, SOLUTION EPIDURAL; INFILTRATION; INTRACAUDAL; PERINEURAL AS NEEDED
Status: DISCONTINUED | OUTPATIENT
Start: 2024-01-12 | End: 2024-01-12

## 2024-01-12 RX ORDER — SODIUM CHLORIDE, SODIUM LACTATE, POTASSIUM CHLORIDE, CALCIUM CHLORIDE 600; 310; 30; 20 MG/100ML; MG/100ML; MG/100ML; MG/100ML
INJECTION, SOLUTION INTRAVENOUS CONTINUOUS PRN
Status: DISCONTINUED | OUTPATIENT
Start: 2024-01-12 | End: 2024-01-12

## 2024-01-12 RX ORDER — MIDAZOLAM HYDROCHLORIDE 2 MG/2ML
INJECTION, SOLUTION INTRAMUSCULAR; INTRAVENOUS AS NEEDED
Status: DISCONTINUED | OUTPATIENT
Start: 2024-01-12 | End: 2024-01-12

## 2024-01-12 RX ORDER — PROMETHAZINE HYDROCHLORIDE 25 MG/ML
12.5 INJECTION, SOLUTION INTRAMUSCULAR; INTRAVENOUS ONCE AS NEEDED
Status: DISCONTINUED | OUTPATIENT
Start: 2024-01-12 | End: 2024-01-12 | Stop reason: HOSPADM

## 2024-01-12 RX ADMIN — SODIUM CHLORIDE, SODIUM LACTATE, POTASSIUM CHLORIDE, AND CALCIUM CHLORIDE: .6; .31; .03; .02 INJECTION, SOLUTION INTRAVENOUS at 12:41

## 2024-01-12 RX ADMIN — MIDAZOLAM 2 MG: 1 INJECTION INTRAMUSCULAR; INTRAVENOUS at 12:41

## 2024-01-12 RX ADMIN — KETOROLAC TROMETHAMINE 30 MG: 30 INJECTION, SOLUTION INTRAMUSCULAR; INTRAVENOUS at 13:55

## 2024-01-12 RX ADMIN — HYDROMORPHONE HYDROCHLORIDE 0.5 MG: 1 INJECTION, SOLUTION INTRAMUSCULAR; INTRAVENOUS; SUBCUTANEOUS at 15:23

## 2024-01-12 RX ADMIN — Medication 140 MG: at 12:45

## 2024-01-12 RX ADMIN — ONDANSETRON 4 MG: 2 INJECTION INTRAMUSCULAR; INTRAVENOUS at 13:55

## 2024-01-12 RX ADMIN — TRAMADOL HYDROCHLORIDE 50 MG: 50 TABLET, COATED ORAL at 16:45

## 2024-01-12 RX ADMIN — HYDROMORPHONE HYDROCHLORIDE 0.5 MG: 1 INJECTION, SOLUTION INTRAMUSCULAR; INTRAVENOUS; SUBCUTANEOUS at 15:43

## 2024-01-12 RX ADMIN — FENTANYL CITRATE 25 MCG: 50 INJECTION INTRAMUSCULAR; INTRAVENOUS at 15:09

## 2024-01-12 RX ADMIN — LIDOCAINE HYDROCHLORIDE 50 MG: 10 INJECTION, SOLUTION EPIDURAL; INFILTRATION; INTRACAUDAL; PERINEURAL at 12:45

## 2024-01-12 RX ADMIN — CEFAZOLIN SODIUM 1000 MG: 1 SOLUTION INTRAVENOUS at 12:47

## 2024-01-12 RX ADMIN — FENTANYL CITRATE 50 MCG: 50 INJECTION INTRAMUSCULAR; INTRAVENOUS at 13:17

## 2024-01-12 RX ADMIN — FENTANYL CITRATE 25 MCG: 50 INJECTION INTRAMUSCULAR; INTRAVENOUS at 15:13

## 2024-01-12 RX ADMIN — PROPOFOL 200 MG: 10 INJECTION, EMULSION INTRAVENOUS at 12:45

## 2024-01-12 RX ADMIN — CEFAZOLIN SODIUM 2000 MG: 2 SOLUTION INTRAVENOUS at 12:47

## 2024-01-12 RX ADMIN — DEXAMETHASONE SODIUM PHOSPHATE 10 MG: 10 INJECTION, SOLUTION INTRAMUSCULAR; INTRAVENOUS at 12:49

## 2024-01-12 RX ADMIN — FENTANYL CITRATE 50 MCG: 50 INJECTION INTRAMUSCULAR; INTRAVENOUS at 12:45

## 2024-01-12 RX ADMIN — ONDANSETRON 4 MG: 2 INJECTION INTRAMUSCULAR; INTRAVENOUS at 16:10

## 2024-01-12 NOTE — ANESTHESIA PREPROCEDURE EVALUATION
Review of Systems/Medical History  Patient summary reviewed.  Chart reviewed.  No history of anesthetic complications     Cardiovascular  Negative cardio ROS Exercise tolerance (METS): >4 METS.     Pulmonary   Asthma , well controlled/ stable         GI/Hepatic  Negative GI/hepatic ROS          Negative  ROS        Endo/Other   Diabetes  gestational Diet controlled,    Obesity  morbid obesity   GYN    , Prior pregnancy/OB history  ,          Hematology  Negative hematology ROS ,     Musculoskeletal  Negative musculoskeletal ROS         Neurology  Negative neurology ROS ,     Psychology   Negative psychology ROS                 Physical Exam    Airway    Mallampati score: I  TM Distance: >3 FB  Neck ROM: full     Dental   No notable dental hx     Cardiovascular  Comment: Negative ROS    Pulmonary      Other Findings  post-pubertal.      Anesthesia Plan  ASA Score- 3     Anesthesia Type-          Additional Monitors:     Airway Plan: ETT.    Comment: Patient seen and examined.  History reviewed.  Patient to be done under general anesthesia with ETT and routine monitors.  Risks discussed with the patient. Consent obtained..       Plan Factors-Exercise tolerance (METS): >4 METS.    Chart reviewed.   Existing labs reviewed. Patient summary reviewed.      Patient did not smoke on day of surgery.            Induction- intravenous.    Postoperative Plan-     Informed Consent- Anesthetic plan and risks discussed with patient.  I personally reviewed this patient with the CRNA. Discussed and agreed on the Anesthesia Plan with the CRNA..

## 2024-01-12 NOTE — INTERVAL H&P NOTE
H&P reviewed. After examining the patient I find no changes in the patients condition since the H&P had been written.    Vitals:    01/12/24 1137   BP: 158/70   Pulse: 86   Resp: 18   Temp: (!) 96 °F (35.6 °C)   SpO2: 98%

## 2024-01-12 NOTE — OP NOTE
OPERATIVE REPORT  PATIENT NAME: Sandrita Herron    :  1985  MRN: [id number]  Pt Location: [facility] 01    SURGERY DATE: 2024    Surgeons and Role:     * Nisha Garcia MD - Primary     * Orquidea Church MD - Assisting    Preop Diagnosis:  Malignant melanoma of left upper extremity including shoulder (HCC) [C43.62]    Post-Op Diagnosis Codes:     * Malignant melanoma of left upper extremity including shoulder (HCC) [C43.62]    Procedure(s):  Left - LEFT UPPER ARM MELANOMA WIDE LOCAL EXCISION  Left - SENTINEL LYMPH NODE BIOPSY (INJECT IN NUC MED BY RADIOLOGIST AT 11:30AM)  Left - LEFT UPPER ARM LIPOMA EXCISION    Specimen(s):  ID Type Source Tests Collected by Time Destination   1 : left upper arm lipoma Tissue Arm, Left TISSUE EXAM Nisha Garcia MD 2024 1323    2 : left axillary sentinel lymph node #1 Tissue Lymph Node, [place] TISSUE EXAM Nisha Garcia MD 2024 1347    3 : left upper arm melanoma wide excision Tissue Arm, Left TISSUE EXAM Nisha Garcia MD 2024 1404        Estimated Blood Loss:   Minimal    Drains:  * No LDAs found *    Anesthesia Type:   General    Operative Indications:  Malignant melanoma of left upper extremity including shoulder (HCC) [C43.62]  Symptomatic left arm lipoma    Operative Findings:  Well circumscribed 8 cm lipoma of left posterior upper arm  One sentinel lymph node within the left axilla  Melanoma dimensions: 0.7 x 1.1 cm  Margin: 1.2 cm  Final dimensions: 4.1 x 6.5 cm    Complications:   None    Procedure and Technique:  The patient was met in preop.  The site of mapping was noted at the left axilla.  The patient was then transported to preop and finally to the operating room.  The patient was identified and general endotracheal intubation was achieved.  The patient's left upper arm melanoma was injected with 2 mL methylene blue.  The patient's left arm and axilla were prepped and draped in the usual sterile fashion after removal of the  hair in this region.  A timeout was performed. A 6 cm incision was made over the diameter of the lipoma. The deeper tissues were divided with electrocautery and skin flaps were raised just deep to the dermis. Just deep to this, a well-defined lipoma was palpated and circumferentially dissected free from the surrounding uninvolved fat with electrocautery. The lipoma was passed off the field. The defect was irrigated and found to be hemostatic. This was packed for later closure. A 5 cm curvilinear incision was made at the anteroinferior margin of the hair-bearing region.  The deeper tissues were divided with electrocautery down to the clavipectoral fascia which was divided with electrocautery.  One lymph node was found in the axilla which was hot but not blue.  This lymph node was circumferentially dissected and larger branches tied off.  It was then passed off field as sentinel lymph node 1. The rest of the lymph node basin was carefully checked for any signs of abnormality, blue dye, or elevated [place] counts.  No other nodes were detected.  The axilla was then copiously irrigated and found to be hemostatic.  Clavipectoral fascia as well as the deep dermal tissue were closed with 3-0 Vicryl.  The skin was run with 4-0 Monocryl followed by skin glue.  Steri-Strips were placed over the incision after the glue was dry.  We then turned our attention to the primary melanoma of the left upper arm.  The margins of the melanoma were marked at 0.7 x 1.1 cm.  A circumferential margin of 1.2 cm was marked.  The skin was incised sharply to the muscular fascia.  The melanoma was then dissected off of the fascia with electrocautery.  The specimen was marked with a stitch at 12 o clock.  Skin flaps were then raised both superiorly and inferiorly to facilitate a tension-free closure, as the tension lines of the skin preferentially closed this way in contrast to the perpendicular lipoma excision. Re-approximation of the tissue was  estimated and standing cone defects were removed. The final dimensions were 4.1 x 6.5 cm. The defect was copiously irrigated and found to be hemostatic.  The deep dermal tissues were closed with interrupted 2-0 Vicryl suture and the skin run with monocryl. Several 3-0 nylon vertical mattress sutures were placed in order to relieve any remaining tension. The lipoma incision was closed in multiple layers as well with all suture deep to the epidermis. This was then closed with skin glue and steris. The melanoma incision was then cleaned and dried.  Triple antibiotic ointment was placed over the length of the incision and was covered with a sterile dressing of gauze and Tegaderm.     I was present for the entire procedure.    Patient Disposition:  PACU     Wide Local Excision for Primary Cutaneous Melanoma - Excision 1 (Upper Arm - Left)  Operation performed with curative intent Yes   Original [place] thickness of the lesion 1.2 mm (to the tenth of a millimeter)   Clinical margin width   (measured from the edge of the lesion or the prior excision scar) Other 1.2 per protocol   Depth of excision Full-thickness skin/subcutaneous tissue down to fascia (melanoma)            SIGNATURE: Nisha Garcia MD  DATE: January 12, 2024  TIME: 2:46 PM

## 2024-01-12 NOTE — DISCHARGE INSTR - AVS FIRST PAGE
There is skin glue and a small band aid strip over two of your incisions. The final incision is closed with both inner and outer sutures. The inner sutures will dissolve on their own. The outer sutures will be removed at your post operative visit. Beginning tomorrow, you can shower with gentle soap and water as usual and the band aid strip will fall off over time. Do not soak your incision (eg in a bath) for 10 days.     Use over the counter tylenol and advil as needed for pain - these can be utilized at the same time. For example, if needed, I recommend 1G tylenol @ 0900, 600 mg advil @ 1200, 1G tylenol @ 1500, 600 mg advil @ 1800, etc. If you have pain that exceeds this, take prescribed medication as needed.    Expect tenderness after surgery. If you have any significant fever (>101), spreading redness, drainage of pus-like fluid, give my office a call. If you have any other issues or questions, call anytime. [phone]

## 2024-01-12 NOTE — ANESTHESIA POSTPROCEDURE EVALUATION
Post-Op Assessment Note    CV Status:  Stable    Pain management: adequate       Mental Status:  Sleepy   Hydration Status:  Euvolemic   PONV Controlled:  Controlled   Airway Patency:  Patent     Post Op Vitals Reviewed: Yes      Staff: CRNA               BP   134/86   Temp      Pulse  70   Resp      SpO2   100 FM 6

## 2024-01-15 ENCOUNTER — TELEPHONE (OUTPATIENT)
Dept: HEMATOLOGY ONCOLOGY | Facility: CLINIC | Age: 39
End: 2024-01-15

## 2024-01-15 ENCOUNTER — TELEPHONE (OUTPATIENT)
Dept: CARDIAC SURGERY | Facility: CLINIC | Age: 39
End: 2024-01-15

## 2024-01-15 NOTE — TELEPHONE ENCOUNTER
Call Transfer   Who are you speaking with?  Patient   If it is not the patient, are they listed on an active communication consent form? N/A   Who is the patients HemOnc/SurgOnc provider? Dr. Garcia   What is the reason for this call? Incision is bleeding   Person/Department that the call was transferred to?    Time that call was transferred?    Eli P   Your call will be transferred now. If you receive a voicemail, please leave a detailed message and a member of the team will return your call as soon as possible.    Did you relay this information to the caller?  Yes

## 2024-01-22 PROCEDURE — 88342 IMHCHEM/IMCYTCHM 1ST ANTB: CPT | Performed by: STUDENT IN AN ORGANIZED HEALTH CARE EDUCATION/TRAINING PROGRAM

## 2024-01-22 PROCEDURE — 88341 IMHCHEM/IMCYTCHM EA ADD ANTB: CPT | Performed by: STUDENT IN AN ORGANIZED HEALTH CARE EDUCATION/TRAINING PROGRAM

## 2024-01-22 PROCEDURE — 88307 TISSUE EXAM BY PATHOLOGIST: CPT | Performed by: STUDENT IN AN ORGANIZED HEALTH CARE EDUCATION/TRAINING PROGRAM

## 2024-01-22 PROCEDURE — 88305 TISSUE EXAM BY PATHOLOGIST: CPT | Performed by: STUDENT IN AN ORGANIZED HEALTH CARE EDUCATION/TRAINING PROGRAM

## 2024-01-22 PROCEDURE — 88304 TISSUE EXAM BY PATHOLOGIST: CPT | Performed by: STUDENT IN AN ORGANIZED HEALTH CARE EDUCATION/TRAINING PROGRAM

## 2024-01-23 ENCOUNTER — TELEPHONE (OUTPATIENT)
Dept: HEMATOLOGY ONCOLOGY | Facility: CLINIC | Age: 39
End: 2024-01-23

## 2024-01-23 ENCOUNTER — OFFICE VISIT (OUTPATIENT)
Dept: SURGICAL ONCOLOGY | Facility: CLINIC | Age: 39
End: 2024-01-23

## 2024-01-23 VITALS
TEMPERATURE: 98.3 F | HEIGHT: 67 IN | WEIGHT: 280 LBS | OXYGEN SATURATION: 98 % | BODY MASS INDEX: 43.95 KG/M2 | SYSTOLIC BLOOD PRESSURE: 120 MMHG | DIASTOLIC BLOOD PRESSURE: 70 MMHG | HEART RATE: 81 BPM

## 2024-01-23 DIAGNOSIS — C43.62 MALIGNANT MELANOMA OF LEFT UPPER EXTREMITY INCLUDING SHOULDER (HCC): Primary | ICD-10-CM

## 2024-01-23 DIAGNOSIS — D17.22 LIPOMA OF LEFT UPPER EXTREMITY: ICD-10-CM

## 2024-01-23 PROBLEM — D17.9 LIPOMA: Status: ACTIVE | Noted: 2024-01-23

## 2024-01-23 PROCEDURE — 99024 POSTOP FOLLOW-UP VISIT: CPT | Performed by: STUDENT IN AN ORGANIZED HEALTH CARE EDUCATION/TRAINING PROGRAM

## 2024-01-23 NOTE — PROGRESS NOTES
Surgical Oncology Follow Up    [address] [facility]  [facility] [place]  [address]  [place] [address]    Sandrita Herron  1985  [id number]    Diagnoses and all orders for this visit:    Malignant melanoma of left upper extremity including shoulder (HCC)    Lipoma of left upper extremity        Chief Complaint   Patient presents with    Post-op       No follow-ups on file.      Oncology History   Malignant melanoma of left upper extremity including shoulder (HCC)   5/18/2023 Biopsy    Left upper arm, shave biopsy   Thickness 1.2mm  1 Mitoses   No ulceration        9/28/2023 -  Cancer Staged    Staging form: Melanoma of the Skin, AJCC 8th Edition  - Clinical stage from 9/28/2023: Stage IB (cT2a, cN0, cM0) - Signed by Alaina Clarke MD on 12/7/2023 11/22/2023 Initial Diagnosis    Malignant melanoma of left upper extremity including shoulder (HCC)     1/12/2024 Surgery    B. Lymph node, left axillary sentinel lymph node #1:     -Two lymph nodes; one lymph node with rare solitary cells (<0.1 mm in diameter) suspicious (though not definitive) for metastatic melanoma cells (1/2 lymph nodes) (see comment).     -Incidental capsular/trabecular nevus.        C. Skin, left upper arm, excision:     -Residual focal atypical dermal melanocytic proliferation, consistent with residual melanoma (thickness: 0.7 mm); not seen at examined inked specimen margins.     -Prior procedure site changes present.         Staging: T2N0  Treatment history: WLE SLN 12/2023  Current treatment: postop  Disease status: obs    History of Present Illness: s/p WLE SLN as above. Residual eliazar with final stage T2a. Small melanocytes within node favored to be benign on expert consult. Pt doing well with no issues.     Review of Systems  Complete ROS Surg Onc:   Constitutional: The patient denies new or recent history of general fatigue, no recent weight loss, no change in appetite.   Eyes: No  complaints of visual problems, no scleral icterus.   ENT: no complaints of ear pain, no hoarseness, no difficulty swallowing,  no tinnitus and no new masses in head, oral cavity, or neck.   Cardiovascular: No complaints of chest pain, no palpitations, no ankle edema.   Respiratory: No complaints of shortness of breath, no cough.   Gastrointestinal: No complaints of jaundice, no bloody stools, no pale stools.   Genitourinary: No complaints of dysuria, no hematuria, no nocturia, no frequent urination, no urethral discharge.   Musculoskeletal: No complaints of weakness, paralysis, joint stiffness or arthralgias.  Integumentary: No complaints of rash, no new lesions.   Neurological: No complaints of convulsions, no seizures, no dizziness.   Hematologic/Lymphatic: No complaints of easy bruising.   Endocrine:  No hot or cold intolerance.  No polydipsia, polyphagia, or polyuria.  Allergy/immunology:  No environmental allergies.  No food allergies.  Not immunocompromised.  Skin:  No pallor or rash.  No wound.        Patient Active Problem List   Diagnosis    Allergic rhinitis    Moderate persistent asthma    Fatigue    Migraine without status migrainosus, not intractable    Seasonal affective disorder (HCC)    Hyperglycemia    Dyslipidemia    Morbid obesity with BMI of 40.0-44.9, adult (HCC)    Mass of left breast    Back skin lesion    Malignant melanoma of left upper extremity including shoulder (HCC)    Lipoma     Past Medical History:   Diagnosis Date    Asthma     had vaccine     Cancer (HCC)     melanoma    Diabetes mellitus (HCC)     gestational DM diet controlled    Infertility 02/03/2017    only got  sperm tested, naturally conceived     Lumbar strain 07/16/2015    Melanoma (HCC)     Punch Bx left upper extremity    Pneumonia     Varicella     chicken pox as child    Visual impairment     eyewear      Past Surgical History:   Procedure Laterality Date    FOOT SURGERY      LIPOMA RESECTION Left 1/12/2024     Procedure: LEFT UPPER ARM LIPOMA EXCISION;  Surgeon: Nisha Garcia MD;  [facility];  Service: Surgical Oncology    LYMPH NODE BIOPSY Left 2024    Procedure: SENTINEL LYMPH NODE BIOPSY (INJECT IN NUC MED BY RADIOLOGIST AT 11:30AM);  Surgeon: Nisha Garcia MD;  [facility];  Service: Surgical Oncology    SKIN LESION EXCISION Left 2024    Procedure: LEFT UPPER ARM MELANOMA WIDE LOCAL EXCISION;  Surgeon: Nisha Garcia MD;  [facility];  Service: Surgical Oncology    US GUIDED BREAST BIOPSY LEFT COMPLETE Left 2023    WISDOM TOOTH EXTRACTION       Family History   Problem Relation Age of Onset    Multiple sclerosis Mother     Skin cancer Mother     Cancer Mother     Other Mother         Myocardial bridge    Diabetes Father     Other Father         Pericarditis    Miscarriages / Stillbirths Sister     Asthma Maternal Grandmother     Diabetes Paternal Grandmother     No Known Problems Son     Learning disabilities Other     No Known Problems Paternal Aunt     No Known Problems Maternal Aunt     Breast cancer Neg Hx     Colon cancer Neg Hx     Ovarian cancer Neg Hx     Uterine cancer Neg Hx     Cervical cancer Neg Hx      Social History     Socioeconomic History    Marital status: /Civil Union     Spouse name: Not on file    Number of children: Not on file    Years of education: Not on file    Highest education level: Not on file   Occupational History    Occupation: [employer]     Employer: [employer]   Tobacco Use    Smoking status: Former     Current packs/day: 0.00     Average packs/day: 0.5 packs/day for 12.5 years (6.3 ttl pk-yrs)     Types: Cigarettes     Start date:      Quit date: 2017     Years since quittin.5    Smokeless tobacco: Never   Vaping Use    Vaping status: Never Used   Substance and Sexual Activity    Alcohol use: Not Currently     Comment: barley    Drug use: No    Sexual activity: Yes     Partners: Male     Birth  control/protection: None     Comment: withdrawl   Other Topics Concern    Not on file   Social History Narrative    Caffeine use     Social Determinants of Health     Financial Resource Strain: Not on file   Food Insecurity: Not on file   Transportation Needs: Not on file   Physical Activity: Not on file   Stress: Not on file   Social Connections: Not on file   Intimate Partner Violence: Not on file   Housing Stability: Not on file       Current Outpatient Medications:     albuterol (Ventolin HFA) 90 mcg/act inhaler, Inhale 2 puffs every 4 (four) hours as needed for wheezing or shortness of breath, Disp: 18 g, Rfl: 0    clindamycin (CLEOCIN T) 1 % lotion, Apply topically 2 (two) times a day, Disp: 60 mL, Rfl: 3    metFORMIN (GLUCOPHAGE) 500 mg tablet, Take one pill for one month. If tolerating well, increase to two pills daily. Limit alcohol while on this medication. (Patient taking differently: Take 500 mg by mouth 2 (two) times a day with meals), Disp: 60 tablet, Rfl: 4    spironolactone (ALDACTONE) 50 mg tablet, Take 1 pill (50 mg) once daily for two weeks. Take with large glass of water. If well-tolerated, increase to 2 pills (100 mg) daily. Avoid large amounts of high potassium foods while on this medication., Disp: 60 tablet, Rfl: 3    traMADol (Ultram) 50 mg tablet, Take 1 tablet (50 mg total) by mouth every 6 (six) hours as needed for moderate pain (Patient not taking: Reported on 1/23/2024), Disp: 10 tablet, Rfl: 0  Allergies   Allergen Reactions    Sulfa Antibiotics Other (See Comments)     Pt does not know what happens when she takes was a child when had the allergy     Vitals:    01/23/24 1001   BP: 120/70   Pulse: 81   Temp: 98.3 °F (36.8 °C)   SpO2: 98%       Physical Exam  Constitutional: Patient is well-developed and well-nourished who appears the stated age in no acute distress. Patient is pleasant and talkative.     HEENT:  Normocephalic.  Sclerae are anicteric. Mucous membranes are moist. Neck is  "supple without adenopathy. No JVD.     Chest: Equal chest rise, easy WOB  Cardiac: Heart is regular rate.     Abdomen: Abdomen is non-tender, non-distended and without masses.     Extremities: There is no clubbing or cyanosis. There is no edema.  Symmetric.  Neuro: Grossly nonfocal. Gait is normal.     Lymphatic: No evidence of cervical adenopathy bilaterally. No evidence of axillary adenopathy bilaterally. No evidence of inguinal adenopathy bilaterally.     Skin: Warm, anicteric.  LFT arm and axillary incisions healing well  Psych:  Patient is pleasant and talkative.    Pathology:  Final Diagnosis   A. Soft tissue, left upper arm, excision:     LIPOMA.            B. Lymph node, left axillary sentinel lymph node #1:     -Two lymph nodes; one lymph node with rare solitary cells (<0.1 mm in diameter) suspicious (though not definitive) for metastatic melanoma cells (1/2 lymph nodes) (see comment).     -Incidental capsular/trabecular nevus.     Comment: These rare, solitary suspicious metastatic melanoma cells are positive for both MART-1 and HMB45 immunostains, while the incidental capsular /trabecular nevus (S100+, MART-1+, HMB45-, PRAME-) does not express HMB45 immunostain. However, these suspicious cells are very few in number, and significant cytologic atypia is not seen; accordingly, a diagnosis of unequivocal metastatic melanoma cannot be made. This case was sent for expert dermatopathology consultation to Dr. Mcgarry (see \"Note\" section below), who believes these cells are benign in nature. Multiple levels examined.           C. Skin, left upper arm, excision:     -Residual focal atypical dermal melanocytic proliferation, consistent with residual melanoma (thickness: 0.7 mm); not seen at examined inked specimen margins.     -Prior procedure site changes present.       Labs:  Reviewed in [facility]  NM lymphatic melanoma    Result Date: 1/12/2024  Narrative: SENTINEL NODE LYMPHOSCINTIGRAPHY " INDICATION:   Left posterior shoulder melanoma. Localize sentinel node FINDINGS: 0.53 mCi Tc-99m sulfur colloid (0.6 cc volume) was administered intradermally in divided doses by myself intradermally in the region of the patient's left shoulder melanoma.  Scintigraphic images were obtained over the thorax in multiple projections. Left axillary lymph node is seen. Using scintigraphic guidance, the corresponding skin site was marked with an indelible marker.  The patient was transferred to the operating room in satisfactory condition.     Impression: [place] lymph node localized to the left axilla. Workstation performed: [id number]     XR chest pa & lateral    Result Date: 1/5/2024  Narrative: CHEST INDICATION:   Malignant melanoma of left upper limb, including shoulder. Melanocytic nevi, unspecified. Benign lipomatous neoplasm of skin and subcutaneous tissue of left arm. COMPARISON:  Comparison made with previous examination(s) dated (DX) 18-Jan-2020. EXAM PERFORMED/VIEWS:  XR CHEST PA & LATERAL Images: 2 FINDINGS: Cardiomediastinal silhouette appears unremarkable. The lungs are clear.  No pneumothorax or pleural effusion. Osseous structures appear within normal limits for patient age.     Impression: No acute cardiopulmonary disease. Electronically signed: 01/05/2024 02:59 PM Hussain Syed, MPH,MD      I reviewed the above laboratory and imaging data incl derm notes, med onc, path    Discussion/Summary: Pt is s/p WLE + SLN for T2aN0 melanoma of left upper arm. SLN neg. Path reviewed. Dicussed surveillance with q3 mo visits with derm for skin checks as well as q6 mo visits with me for repeat PE. Discussed vigilance about new skin lesions in this region or new lumps/bumps in LN basins. Discussed need for continued sun protection with sunscreen, hats, minimizing sun exposure. Patient and family expressed understanding and endorsement.

## 2024-01-23 NOTE — TELEPHONE ENCOUNTER
Appointment Schedule   Who are you speaking with? Patient   If it is not the patient, are they listed on an active communication consent form? N/A   Which provider is the appointment scheduled with? Dr. Clarke   At which location is the appointment scheduled for? [place]   When is the appointment scheduled?  Please list date and time 1/24/24 4:00pm   What is the reason for this appointment? 8 week follow up   Did patient voice understanding of the details of this appointment? Yes   Was the no show policy reviewed with patient? Yes

## 2024-01-24 ENCOUNTER — OFFICE VISIT (OUTPATIENT)
Dept: HEMATOLOGY ONCOLOGY | Facility: CLINIC | Age: 39
End: 2024-01-24
Payer: COMMERCIAL

## 2024-01-24 VITALS
TEMPERATURE: 98 F | OXYGEN SATURATION: 98 % | WEIGHT: 283.5 LBS | DIASTOLIC BLOOD PRESSURE: 80 MMHG | SYSTOLIC BLOOD PRESSURE: 118 MMHG | BODY MASS INDEX: 44.49 KG/M2 | HEIGHT: 67 IN | RESPIRATION RATE: 17 BRPM | HEART RATE: 76 BPM

## 2024-01-24 DIAGNOSIS — Z08 ENCOUNTER FOR FOLLOW-UP SURVEILLANCE OF MELANOMA: ICD-10-CM

## 2024-01-24 DIAGNOSIS — C43.62 MALIGNANT MELANOMA OF LEFT UPPER EXTREMITY INCLUDING SHOULDER (HCC): Primary | ICD-10-CM

## 2024-01-24 DIAGNOSIS — Z85.820 ENCOUNTER FOR FOLLOW-UP SURVEILLANCE OF MELANOMA: ICD-10-CM

## 2024-01-24 PROCEDURE — 99213 OFFICE O/P EST LOW 20 MIN: CPT | Performed by: INTERNAL MEDICINE

## 2024-01-24 NOTE — PROGRESS NOTES
[facility] [place]  [address]  [place] [address]  [phone]     Date of Visit: 1/24/2024  Name: Sandrita Herron   YOB: 1985        Subjective    VISIT DIAGNOSIS:  Diagnoses and all orders for this visit:    Malignant melanoma of left upper extremity including shoulder (HCC)  -     Cancel: CBC and differential; Future  -     Cancel: Comprehensive metabolic panel; Future  -     Cancel: LD,Blood; Future  -     CBC and differential; Future  -     Comprehensive metabolic panel; Future  -     LD,Blood; Future    Encounter for follow-up surveillance of melanoma  -     Cancel: CBC and differential; Future  -     Cancel: Comprehensive metabolic panel; Future  -     Cancel: LD,Blood; Future  -     CBC and differential; Future  -     Comprehensive metabolic panel; Future  -     LD,Blood; Future        Oncology History   Malignant melanoma of left upper extremity including shoulder (HCC)   5/18/2023 Biopsy    Left upper arm, shave biopsy   Thickness 1.2mm  1 Mitoses   No ulceration        9/28/2023 -  Cancer Staged    Staging form: Melanoma of the Skin, AJCC 8th Edition  - Clinical stage from 9/28/2023: Stage IB (cT2a, cN0, cM0) - Signed by Alaina Clarke MD on 12/7/2023 11/22/2023 Initial Diagnosis    Malignant melanoma of left upper extremity including shoulder (HCC)     1/12/2024 Surgery    B. Lymph node, left axillary sentinel lymph node #1:     -Two lymph nodes; one lymph node with rare solitary cells (<0.1 mm in diameter) suspicious (though not definitive) for metastatic melanoma cells (1/2 lymph nodes) (see comment).     -Incidental capsular/trabecular nevus.        C. Skin, left upper arm, excision:     -Residual focal atypical dermal melanocytic proliferation, consistent with residual melanoma (thickness: 0.7 mm); not seen at examined inked specimen margins.     -Prior procedure site changes present.     1/23/2024 -  Cancer Staged     Staging form: Melanoma of the Skin, AJCC 8th Edition  - Pathologic: Stage IB (pT2a, pN0, cM0) - Signed by iNsha Garcia MD on 1/23/2024          Cancer Staging   Malignant melanoma of left upper extremity including shoulder (HCC)  Staging form: Melanoma of the Skin, AJCC 8th Edition  - Clinical stage from 9/28/2023: Stage IB (cT2a, cN0, cM0) - Signed by Alaina Clarke MD on 12/7/2023  - Pathologic: Stage IB (pT2a, pN0, cM0) - Signed by Nisha Garcia MD on 1/23/2024          HISTORY OF PRESENT ILLNESS: Sandrita Herron is a 38 y.o. female  who is here for follow-up. Melanoma specific history, wide local excision and sentinel lymph node biopsy on 01/12/2024.    She underwent surgery on 01/12/2024. She has some soreness post-surgery. She saw Dr. Garcia, who informed her about the findings. She is not worried about anything on her skin today. She denies any lumps or bumps. Everything is getting better. Since she has been using her arm more than she did last week, she feels sore. She will resume her work on Friday.        Review of Systems   Constitutional:  Negative for appetite change, fatigue, fever and unexpected weight change.   HENT:   Negative for lump/mass, trouble swallowing and voice change.    Eyes:  Negative for icterus.   Respiratory:  Negative for cough, shortness of breath and wheezing.    Cardiovascular:  Negative for leg swelling.   Gastrointestinal:  Negative for abdominal pain, constipation, diarrhea, nausea and vomiting.   Genitourinary:  Negative for difficulty urinating and hematuria.    Musculoskeletal:  Negative for arthralgias, gait problem and myalgias.        Soreness in the left axilla   Skin:  Negative for itching and rash.        No new, changing, or concerning lesions.   Neurological:  Negative for extremity weakness, gait problem, headaches, light-headedness and numbness.   Hematological:  Negative for adenopathy.        MEDICATIONS:    Current Outpatient Medications:      albuterol (Ventolin HFA) 90 mcg/act inhaler, Inhale 2 puffs every 4 (four) hours as needed for wheezing or shortness of breath, Disp: 18 g, Rfl: 0    clindamycin (CLEOCIN T) 1 % lotion, Apply topically 2 (two) times a day, Disp: 60 mL, Rfl: 3    metFORMIN (GLUCOPHAGE) 500 mg tablet, Take one pill for one month. If tolerating well, increase to two pills daily. Limit alcohol while on this medication. (Patient taking differently: Take 500 mg by mouth 2 (two) times a day with meals), Disp: 60 tablet, Rfl: 4    spironolactone (ALDACTONE) 50 mg tablet, Take 1 pill (50 mg) once daily for two weeks. Take with large glass of water. If well-tolerated, increase to 2 pills (100 mg) daily. Avoid large amounts of high potassium foods while on this medication., Disp: 60 tablet, Rfl: 3    traMADol (Ultram) 50 mg tablet, Take 1 tablet (50 mg total) by mouth every 6 (six) hours as needed for moderate pain (Patient not taking: Reported on 1/23/2024), Disp: 10 tablet, Rfl: 0     ALLERGIES:  Allergies   Allergen Reactions    Sulfa Antibiotics Other (See Comments)     Pt does not know what happens when she takes was a child when had the allergy        ACTIVE PROBLEMS:  Patient Active Problem List   Diagnosis    Allergic rhinitis    Moderate persistent asthma    Fatigue    Migraine without status migrainosus, not intractable    Seasonal affective disorder (HCC)    Hyperglycemia    Dyslipidemia    Morbid obesity with BMI of 40.0-44.9, adult (HCC)    Mass of left breast    Back skin lesion    Malignant melanoma of left upper extremity including shoulder (HCC)    Lipoma    Encounter for follow-up surveillance of melanoma          PAST MEDICAL HISTORY:   Past Medical History:   Diagnosis Date    Asthma     had vaccine     Cancer (HCC)     melanoma    Diabetes mellitus (HCC)     gestational DM diet controlled    Infertility 02/03/2017    only got  sperm tested, naturally conceived     Lumbar strain 07/16/2015    Melanoma (HCC)     Punch  Bx left upper extremity    Pneumonia     Varicella     chicken pox as child    Visual impairment     eyewear         PAST SURGICAL HISTORY:  Past Surgical History:   Procedure Laterality Date    FOOT SURGERY      LIPOMA RESECTION Left 2024    Procedure: LEFT UPPER ARM LIPOMA EXCISION;  Surgeon: Nisha Garcia MD;  [facility];  Service: Surgical Oncology    LYMPH NODE BIOPSY Left 2024    Procedure: SENTINEL LYMPH NODE BIOPSY (INJECT IN NUC MED BY RADIOLOGIST AT 11:30AM);  Surgeon: Nisha Garcia MD;  [facility];  Service: Surgical Oncology    SKIN LESION EXCISION Left 2024    Procedure: LEFT UPPER ARM MELANOMA WIDE LOCAL EXCISION;  Surgeon: Nisha Garcia MD;  [facility];  Service: Surgical Oncology    US GUIDED BREAST BIOPSY LEFT COMPLETE Left 2023    WISDOM TOOTH EXTRACTION          SOCIAL HISTORY:  Social History     Socioeconomic History    Marital status: /Civil Union     Spouse name: Not on file    Number of children: Not on file    Years of education: Not on file    Highest education level: Not on file   Occupational History    Occupation: [employer]     Employer: [employer]   Tobacco Use    Smoking status: Former     Current packs/day: 0.00     Average packs/day: 0.5 packs/day for 12.5 years (6.3 ttl pk-yrs)     Types: Cigarettes     Start date:      Quit date: 2017     Years since quittin.5    Smokeless tobacco: Never   Vaping Use    Vaping status: Never Used   Substance and Sexual Activity    Alcohol use: Not Currently     Comment: barley    Drug use: No    Sexual activity: Yes     Partners: Male     Birth control/protection: None     Comment: withdrawl   Other Topics Concern    Not on file   Social History Narrative    Caffeine use     Social Determinants of Health     Financial Resource Strain: Not on file   Food Insecurity: Not on file   Transportation Needs: Not on file   Physical Activity: Not on file   Stress: Not on  "file   Social Connections: Not on file   Intimate Partner Violence: Not on file   Housing Stability: Not on file        FAMILY HISTORY:  Family History   Problem Relation Age of Onset    Multiple sclerosis Mother     Skin cancer Mother     Cancer Mother     Other Mother         Myocardial bridge    Diabetes Father     Other Father         Pericarditis    Miscarriages / Stillbirths Sister     Asthma Maternal Grandmother     Diabetes Paternal Grandmother     No Known Problems Son     Learning disabilities Other     No Known Problems Paternal Aunt     No Known Problems Maternal Aunt     Breast cancer Neg Hx     Colon cancer Neg Hx     Ovarian cancer Neg Hx     Uterine cancer Neg Hx     Cervical cancer Neg Hx            Objective    PHYSICAL EXAMINATION:   Blood pressure 118/80, pulse 76, temperature 98 °F (36.7 °C), resp. rate 17, height 5' 7\", weight 129 kg (283 lb 8 oz), SpO2 98%, not currently breastfeeding.     Pain Score: 0-No pain     ECOG Performance Status      Flowsheet Row Most Recent Value   ECOG Performance Status 0 - Fully active, able to carry on all pre-disease performance without restriction               Physical Exam  Constitutional:       General: She is not in acute distress.     Appearance: Normal appearance. She is not ill-appearing or toxic-appearing.   HENT:      Head: Normocephalic and atraumatic.      Right Ear: External ear normal.      Left Ear: External ear normal.      Nose: Nose normal.      Mouth/Throat:      Mouth: Mucous membranes are moist.      Pharynx: Oropharynx is clear.   Eyes:      General: No scleral icterus.        Right eye: No discharge.         Left eye: No discharge.      Conjunctiva/sclera: Conjunctivae normal.   Pulmonary:      Effort: Pulmonary effort is normal. No respiratory distress.      Breath sounds: No wheezing.   Abdominal:      General: There is no distension.   Musculoskeletal:         General: No swelling or tenderness.      Cervical back: Normal range of " motion. No rigidity.      Right lower leg: No edema.      Left lower leg: No edema.   Lymphadenopathy:      Head:      Right side of head: No submandibular, preauricular or posterior auricular adenopathy.      Left side of head: No submandibular, preauricular or posterior auricular adenopathy.      Cervical:      Right cervical: No superficial or posterior cervical adenopathy.     Left cervical: No superficial or posterior cervical adenopathy.      Upper Body:      Right upper body: No supraclavicular or axillary adenopathy.      Left upper body: No supraclavicular or axillary adenopathy.      Lower Body: No right inguinal adenopathy. No left inguinal adenopathy.   Skin:     General: Skin is warm.      Coloration: Skin is not jaundiced.      Findings: No lesion or rash.      Comments: Well healed surgical scar.  No evidence of recurrence at primary site.   Neurological:      General: No focal deficit present.      Mental Status: She is alert and oriented to person, place, and time.      Cranial Nerves: No cranial nerve deficit.      Motor: No weakness.      Gait: Gait normal.   Psychiatric:         Mood and Affect: Mood normal.         Behavior: Behavior normal.         Thought Content: Thought content normal.         Judgment: Judgment normal.         I reviewed lab data in the chart.    Soft tissue left upper arm excision lipoma. Lymph node, left axillary sentinel lymph node.    1. Two lymph nodes. One lymph node with rare solitary cells less than 0.1 mm in diameter suspicious though not definitive for metastatic melanoma cells in 1 out of 2 lymph nodes in addition, there is an incidental capsular/trabecular nevus. The cells are rare, solitary suspicious metastatic melanoma cells positive for Mart one and HMB-45, however, these are, these suspicious cells are few in number and significant cytologic atypia is not seen. It was sent out for consult. Consult comes back as a final diagnosis from 1/22/2024,  demonstrating skin.     Lymph node, sentinel left axillary lymph node.   No evidence of metastatic melanoma in the two lymph nodes. Capsular nevus.    Skin left upper arm excision, residual focal atypical dermal melanocytic proliferation, consistent with residual melanoma, thickness 0.7 mm, not seen and examined, inked. Specimen margins and prior procedure site changes present.    WBC   Date Value Ref Range Status   01/05/2024 6.51 4.31 - 10.16 Thousand/uL Final   08/29/2022 7.25 4.31 - 10.16 Thousand/uL Final   06/04/2020 8.54 4.31 - 10.16 Thousand/uL Final   03/14/2014 7.09 4.31 - 10.16 Thousand/uL Final     Comment:     New Reference Range     Hemoglobin   Date Value Ref Range Status   01/05/2024 12.3 11.5 - 15.4 g/dL Final   08/29/2022 12.5 11.5 - 15.4 g/dL Final   06/04/2020 11.5 11.5 - 15.4 g/dL Final   03/14/2014 13.3 11.5 - 15.4 g/dL Final     Comment:     New Reference Range     Platelets   Date Value Ref Range Status   01/05/2024 274 149 - 390 Thousands/uL Final   08/29/2022 295 149 - 390 Thousands/uL Final   06/04/2020 242 149 - 390 Thousands/uL Final   03/14/2014 342 149 - 390 Thousand/uL Final     Comment:     New Reference Range     MCV   Date Value Ref Range Status   01/05/2024 88 82 - 98 fL Final   08/29/2022 90 82 - 98 fL Final   06/04/2020 90 82 - 98 fL Final   03/14/2014 88 82 - 98 fL Final     Comment:     New Reference Range      Sodium   Date Value Ref Range Status   03/14/2014 137 135 - 145 mmol/L Final     Potassium   Date Value Ref Range Status   01/05/2024 4.0 3.5 - 5.3 mmol/L Final   02/08/2023 4.1 3.5 - 5.3 mmol/L Final   08/29/2022 3.7 3.5 - 5.3 mmol/L Final   03/14/2014 4.3 3.6 - 5.0 mmol/L Final     Chloride   Date Value Ref Range Status   01/05/2024 104 96 - 108 mmol/L Final   02/08/2023 104 96 - 108 mmol/L Final   08/29/2022 102 96 - 108 mmol/L Final   03/14/2014 103 101 - 111 mmol/L Final     CO2   Date Value Ref Range Status   01/05/2024 26 21 - 32 mmol/L Final   02/08/2023 26 21 -  32 mmol/L Final   08/29/2022 26 21 - 32 mmol/L Final   03/14/2014 27 21 - 31 mmol/L Final     BUN   Date Value Ref Range Status   01/05/2024 13 5 - 25 mg/dL Final   02/08/2023 11 5 - 25 mg/dL Final   08/29/2022 10 5 - 25 mg/dL Final   03/14/2014 11 5 - 27 mg/dL Final     Creatinine   Date Value Ref Range Status   01/05/2024 0.79 0.60 - 1.30 mg/dL Final     Comment:     Standardized to IDMS reference method   02/08/2023 0.85 0.60 - 1.30 mg/dL Final     Comment:     Standardized to IDMS reference method   08/29/2022 0.84 0.60 - 1.30 mg/dL Final     Comment:     Standardized to IDMS reference method   03/14/2014 0.72 0.60 - 1.30 mg/dL Final     Comment:     Standardized to IDMS reference method     Glucose   Date Value Ref Range Status   08/29/2022 98 65 - 140 mg/dL Final     Comment:     If the patient is fasting, the ADA then defines impaired fasting glucose as > 100 mg/dL and diabetes as > or equal to 123 mg/dL.  Specimen collection should occur prior to Sulfasalazine administration due to the potential for falsely depressed results. Specimen collection should occur prior to Sulfapyridine administration due to the potential for falsely elevated results.   06/04/2020 120 65 - 140 mg/dL Final     Comment:       If the patient is fasting, the ADA then defines impaired fasting glucose as > 100 mg/dL and diabetes as > or equal to 123 mg/dL.  Specimen collection should occur prior to Sulfasalazine administration due to the potential for falsely depressed results. Specimen collection should occur prior to Sulfapyridine administration due to the potential for falsely elevated results.     Calcium   Date Value Ref Range Status   01/05/2024 9.0 8.4 - 10.2 mg/dL Final   02/08/2023 8.8 8.4 - 10.2 mg/dL Final   08/29/2022 8.9 8.3 - 10.1 mg/dL Final   03/14/2014 8.9 8.3 - 10.1 mg/dL Final     Total Protein   Date Value Ref Range Status   03/14/2014 7.5 6.4 - 8.2 g/dL Final     Albumin   Date Value Ref Range Status   01/05/2024  4.1 3.5 - 5.0 g/dL Final   02/08/2023 4.1 3.5 - 5.0 g/dL Final   08/29/2022 3.6 3.5 - 5.0 g/dL Final   03/14/2014 3.9 3.5 - 5.0 g/dL Final     Total Bilirubin   Date Value Ref Range Status   01/05/2024 0.57 0.20 - 1.00 mg/dL Final     Comment:     Use of this assay is not recommended for patients undergoing treatment with eltrombopag due to the potential for falsely elevated results.  N-acetyl-p-benzoquinone imine (metabolite of Acetaminophen) will generate erroneously low results in samples for patients that have taken an overdose of Acetaminophen.   02/08/2023 0.66 0.20 - 1.00 mg/dL Final   08/29/2022 0.43 0.20 - 1.00 mg/dL Final     Comment:     Use of this assay is not recommended for patients undergoing treatment with eltrombopag due to the potential for falsely elevated results.     Alkaline Phosphatase   Date Value Ref Range Status   01/05/2024 59 34 - 104 U/L Final   02/08/2023 64 34 - 104 U/L Final   08/29/2022 70 46 - 116 U/L Final   03/14/2014 83 42 - 121 U/L Final     AST   Date Value Ref Range Status   01/05/2024 13 13 - 39 U/L Final   02/08/2023 14 13 - 39 U/L Final     Comment:     Specimen collection should occur prior to Sulfasalazine administration due to the potential for falsely depressed results.    08/29/2022 15 5 - 45 U/L Final     Comment:     Specimen collection should occur prior to Sulfasalazine administration due to the potential for falsely depressed results.    03/14/2014 19 10 - 42 U/L Final     ALT   Date Value Ref Range Status   01/05/2024 21 7 - 52 U/L Final     Comment:     Specimen collection should occur prior to Sulfasalazine administration due to the potential for falsely depressed results.    02/08/2023 19 7 - 52 U/L Final     Comment:     Specimen collection should occur prior to Sulfasalazine administration due to the potential for falsely depressed results.    08/29/2022 32 12 - 78 U/L Final     Comment:     Specimen collection should occur prior to Sulfasalazine  "administration due to the potential for falsely depressed results.    03/14/2014 30 6 - 78 U/L Final      LD   Date Value Ref Range Status   01/05/2024 144 140 - 271 U/L Final     No results found for: \"TSH\"  No results found for: \"O8UYYER\"   No results found for: \"FREET4\"      RECENT IMAGING:  Procedure: NM lymphatic melanoma    Result Date: 1/12/2024  Narrative: SENTINEL NODE LYMPHOSCINTIGRAPHY INDICATION:   Left posterior shoulder melanoma. Localize sentinel node FINDINGS: 0.53 mCi Tc-99m sulfur colloid (0.6 cc volume) was administered intradermally in divided doses by myself intradermally in the region of the patient's left shoulder melanoma.  Scintigraphic images were obtained over the thorax in multiple projections. Left axillary lymph node is seen. Using scintigraphic guidance, the corresponding skin site was marked with an indelible marker.  The patient was transferred to the operating room in satisfactory condition.     Impression: [place] lymph node localized to the left axilla. Workstation performed: [id number]     Procedure: XR chest pa & lateral    Result Date: 1/5/2024  Narrative: CHEST INDICATION:   Malignant melanoma of left upper limb, including shoulder. Melanocytic nevi, unspecified. Benign lipomatous neoplasm of skin and subcutaneous tissue of left arm. COMPARISON:  Comparison made with previous examination(s) dated (DX) 18-Jan-2020. EXAM PERFORMED/VIEWS:  XR CHEST PA & LATERAL Images: 2 FINDINGS: Cardiomediastinal silhouette appears unremarkable. The lungs are clear.  No pneumothorax or pleural effusion. Osseous structures appear within normal limits for patient age.     Impression: No acute cardiopulmonary disease. Electronically signed: 01/05/2024 02:59 PM Hussain Syed, MPH,MD           Assessment/Plan  Ms. Herron is a 38-year-old female with stage Ib melanoma here for continued monitoring, follow-up and surveillance.    1. Malignant melanoma of left upper extremity including shoulder " (HCC)  -     CBC and differential; Future; Expected date: 04/24/2024  -     Comprehensive metabolic panel; Future; Expected date: 04/24/2024  -     LD,Blood; Future; Expected date: 04/24/2024    2. Encounter for follow-up surveillance of melanoma  Assessment & Plan:  She is doing well and recovering from surgery on 01/12/2024. We discussed that the final pathology demonstrates no residual melanoma remaining and negative margins. Also discussed that there is no lymphadenopathy or lymph node involvement. As such, she is a stage 1B and we will continue monitoring her closely for disease recurrence or metastasis. We discussed that we will follow her every 6 months. She will return in 6 months for full body evaluation, blood work. Orders placed and scripts provided for blood work to be done. She knows to call with issues or concerns prior to her next visit.      Orders:  -     CBC and differential; Future; Expected date: 04/24/2024  -     Comprehensive metabolic panel; Future; Expected date: 04/24/2024  -     LD,Blood; Future; Expected date: 04/24/2024    She is scheduled to see Dr. Garcia in 6 months, so she will see us in 3 months and then we will see her every 6 months after that this way there is staggering between my appointment and appointment with Dr. Garcia.    Return in about 3 months (around 4/24/2024) for Office Visit, labs.     Transcribed for Alaina Clarke MD, by Mady Frederick on 01/24/24 at 5:57 PM. Powered by Dragon [employer].

## 2024-01-24 NOTE — LETTER
January 24, 2024     Nisha Garcia MD  [address]  [facility] [address]    Patient: Sandrita Herron   YOB: 1985   Date of Visit: 1/24/2024       Dear Dr. Garcia:    Thank you for referring Sandrita Herron to me for evaluation. Below are my notes for this consultation.    If you have questions, please do not hesitate to call me. I look forward to following your patient along with you.         Sincerely,        Alaina Clarke MD        CC: MD Du Castellanos DO Melissa A Wilson, MD  1/24/2024  6:57 PM  Sign when Signing Visit  [facility] [place]  [address] [address]  [phone]  [phone]     Date of Visit: 1/24/2024  Name: Sandrita Herron   YOB: 1985        Subjective    VISIT DIAGNOSIS:  Diagnoses and all orders for this visit:    Malignant melanoma of left upper extremity including shoulder (HCC)  -     Cancel: CBC and differential; Future  -     Cancel: Comprehensive metabolic panel; Future  -     Cancel: LD,Blood; Future  -     CBC and differential; Future  -     Comprehensive metabolic panel; Future  -     LD,Blood; Future    Encounter for follow-up surveillance of melanoma  -     Cancel: CBC and differential; Future  -     Cancel: Comprehensive metabolic panel; Future  -     Cancel: LD,Blood; Future  -     CBC and differential; Future  -     Comprehensive metabolic panel; Future  -     LD,Blood; Future        Oncology History   Malignant melanoma of left upper extremity including shoulder (HCC)   5/18/2023 Biopsy    Left upper arm, shave biopsy   Thickness 1.2mm  1 Mitoses   No ulceration        9/28/2023 -  Cancer Staged    Staging form: Melanoma of the Skin, AJCC 8th Edition  - Clinical stage from 9/28/2023: Stage IB (cT2a, cN0, cM0) - Signed by Alaina Clarke MD on 12/7/2023 11/22/2023 Initial Diagnosis    Malignant melanoma of left upper extremity including shoulder  (HCC)     1/12/2024 Surgery    B. Lymph node, left axillary sentinel lymph node #1:     -Two lymph nodes; one lymph node with rare solitary cells (<0.1 mm in diameter) suspicious (though not definitive) for metastatic melanoma cells (1/2 lymph nodes) (see comment).     -Incidental capsular/trabecular nevus.        C. Skin, left upper arm, excision:     -Residual focal atypical dermal melanocytic proliferation, consistent with residual melanoma (thickness: 0.7 mm); not seen at examined inked specimen margins.     -Prior procedure site changes present.     1/23/2024 -  Cancer Staged    Staging form: Melanoma of the Skin, AJCC 8th Edition  - Pathologic: Stage IB (pT2a, pN0, cM0) - Signed by Nisha Garcia MD on 1/23/2024          Cancer Staging   Malignant melanoma of left upper extremity including shoulder (HCC)  Staging form: Melanoma of the Skin, AJCC 8th Edition  - Clinical stage from 9/28/2023: Stage IB (cT2a, cN0, cM0) - Signed by Alaina Clarke MD on 12/7/2023  - Pathologic: Stage IB (pT2a, pN0, cM0) - Signed by Nisha Garcia MD on 1/23/2024          HISTORY OF PRESENT ILLNESS: Sandrita Herron is a 38 y.o. female  who is here for follow-up. Melanoma specific history, wide local excision and sentinel lymph node biopsy on 01/12/2024.    She underwent surgery on 01/12/2024. She has some soreness post-surgery. She saw Dr. Garcia, who informed her about the findings. She is not worried about anything on her skin today. She denies any lumps or bumps. Everything is getting better. Since she has been using her arm more than she did last week, she feels sore. She will resume her work on Friday.        Review of Systems   Constitutional:  Negative for appetite change, fatigue, fever and unexpected weight change.   HENT:   Negative for lump/mass, trouble swallowing and voice change.    Eyes:  Negative for icterus.   Respiratory:  Negative for cough, shortness of breath and wheezing.    Cardiovascular:  Negative  for leg swelling.   Gastrointestinal:  Negative for abdominal pain, constipation, diarrhea, nausea and vomiting.   Genitourinary:  Negative for difficulty urinating and hematuria.    Musculoskeletal:  Negative for arthralgias, gait problem and myalgias.        Soreness in the left axilla   Skin:  Negative for itching and rash.        No new, changing, or concerning lesions.   Neurological:  Negative for extremity weakness, gait problem, headaches, light-headedness and numbness.   Hematological:  Negative for adenopathy.        MEDICATIONS:    Current Outpatient Medications:   •  albuterol (Ventolin HFA) 90 mcg/act inhaler, Inhale 2 puffs every 4 (four) hours as needed for wheezing or shortness of breath, Disp: 18 g, Rfl: 0  •  clindamycin (CLEOCIN T) 1 % lotion, Apply topically 2 (two) times a day, Disp: 60 mL, Rfl: 3  •  metFORMIN (GLUCOPHAGE) 500 mg tablet, Take one pill for one month. If tolerating well, increase to two pills daily. Limit alcohol while on this medication. (Patient taking differently: Take 500 mg by mouth 2 (two) times a day with meals), Disp: 60 tablet, Rfl: 4  •  spironolactone (ALDACTONE) 50 mg tablet, Take 1 pill (50 mg) once daily for two weeks. Take with large glass of water. If well-tolerated, increase to 2 pills (100 mg) daily. Avoid large amounts of high potassium foods while on this medication., Disp: 60 tablet, Rfl: 3  •  traMADol (Ultram) 50 mg tablet, Take 1 tablet (50 mg total) by mouth every 6 (six) hours as needed for moderate pain (Patient not taking: Reported on 1/23/2024), Disp: 10 tablet, Rfl: 0     ALLERGIES:  Allergies   Allergen Reactions   • Sulfa Antibiotics Other (See Comments)     Pt does not know what happens when she takes was a child when had the allergy        ACTIVE PROBLEMS:  Patient Active Problem List   Diagnosis   • Allergic rhinitis   • Moderate persistent asthma   • Fatigue   • Migraine without status migrainosus, not intractable   • Seasonal affective  disorder (HCC)   • Hyperglycemia   • Dyslipidemia   • Morbid obesity with BMI of 40.0-44.9, adult (HCC)   • Mass of left breast   • Back skin lesion   • Malignant melanoma of left upper extremity including shoulder (HCC)   • Lipoma   • Encounter for follow-up surveillance of melanoma          PAST MEDICAL HISTORY:   Past Medical History:   Diagnosis Date   • Asthma     had vaccine    • Cancer (HCC)     melanoma   • Diabetes mellitus (HCC)     gestational DM diet controlled   • Infertility 02/03/2017    only got  sperm tested, naturally conceived    • Lumbar strain 07/16/2015   • Melanoma (HCC)     Punch Bx left upper extremity   • Pneumonia    • Varicella     chicken pox as child   • Visual impairment     eyewear         PAST SURGICAL HISTORY:  Past Surgical History:   Procedure Laterality Date   • FOOT SURGERY     • LIPOMA RESECTION Left 1/12/2024    Procedure: LEFT UPPER ARM LIPOMA EXCISION;  Surgeon: Nisha Garcia MD;  [facility];  Service: Surgical Oncology   • LYMPH NODE BIOPSY Left 1/12/2024    Procedure: SENTINEL LYMPH NODE BIOPSY (INJECT IN NUC MED BY RADIOLOGIST AT 11:30AM);  Surgeon: Nisha Garcia MD;  [facility];  Service: Surgical Oncology   • SKIN LESION EXCISION Left 1/12/2024    Procedure: LEFT UPPER ARM MELANOMA WIDE LOCAL EXCISION;  Surgeon: Nisha Garcia MD;  [facility];  Service: Surgical Oncology   • US GUIDED BREAST BIOPSY LEFT COMPLETE Left 12/13/2023   • WISDOM TOOTH EXTRACTION          SOCIAL HISTORY:  Social History     Socioeconomic History   • Marital status: /Civil Union     Spouse name: Not on file   • Number of children: Not on file   • Years of education: Not on file   • Highest education level: Not on file   Occupational History   • Occupation: [employer]     Employer: [employer] EM[employer]YE[employer]   Tobacco Use   • Smoking status: Former     Current packs/day: 0.00     Average packs/day: 0.5 packs/day for 12.5 years (6.3 ttl pk-yrs)      "Types: Cigarettes     Start date:      Quit date: 2017     Years since quittin.5   • Smokeless tobacco: Never   Vaping Use   • Vaping status: Never Used   Substance and Sexual Activity   • Alcohol use: Not Currently     Comment: barley   • Drug use: No   • Sexual activity: Yes     Partners: Male     Birth control/protection: None     Comment: withdrawl   Other Topics Concern   • Not on file   Social History Narrative    Caffeine use     Social Determinants of Health     Financial Resource Strain: Not on file   Food Insecurity: Not on file   Transportation Needs: Not on file   Physical Activity: Not on file   Stress: Not on file   Social Connections: Not on file   Intimate Partner Violence: Not on file   Housing Stability: Not on file        FAMILY HISTORY:  Family History   Problem Relation Age of Onset   • Multiple sclerosis Mother    • Skin cancer Mother    • Cancer Mother    • Other Mother         Myocardial bridge   • Diabetes Father    • Other Father         Pericarditis   • Miscarriages / Stillbirths Sister    • Asthma Maternal Grandmother    • Diabetes Paternal Grandmother    • No Known Problems Son    • Learning disabilities Other    • No Known Problems Paternal Aunt    • No Known Problems Maternal Aunt    • Breast cancer Neg Hx    • Colon cancer Neg Hx    • Ovarian cancer Neg Hx    • Uterine cancer Neg Hx    • Cervical cancer Neg Hx            Objective    PHYSICAL EXAMINATION:   Blood pressure 118/80, pulse 76, temperature 98 °F (36.7 °C), resp. rate 17, height 5' 7\", weight 129 kg (283 lb 8 oz), SpO2 98%, not currently breastfeeding.     Pain Score: 0-No pain     ECOG Performance Status      Flowsheet Row Most Recent Value   ECOG Performance Status 0 - Fully active, able to carry on all pre-disease performance without restriction               Physical Exam  Constitutional:       General: She is not in acute distress.     Appearance: Normal appearance. She is not ill-appearing or " toxic-appearing.   HENT:      Head: Normocephalic and atraumatic.      Right Ear: External ear normal.      Left Ear: External ear normal.      Nose: Nose normal.      Mouth/Throat:      Mouth: Mucous membranes are moist.      Pharynx: Oropharynx is clear.   Eyes:      General: No scleral icterus.        Right eye: No discharge.         Left eye: No discharge.      Conjunctiva/sclera: Conjunctivae normal.   Pulmonary:      Effort: Pulmonary effort is normal. No respiratory distress.      Breath sounds: No wheezing.   Abdominal:      General: There is no distension.   Musculoskeletal:         General: No swelling or tenderness.      Cervical back: Normal range of motion. No rigidity.      Right lower leg: No edema.      Left lower leg: No edema.   Lymphadenopathy:      Head:      Right side of head: No submandibular, preauricular or posterior auricular adenopathy.      Left side of head: No submandibular, preauricular or posterior auricular adenopathy.      Cervical:      Right cervical: No superficial or posterior cervical adenopathy.     Left cervical: No superficial or posterior cervical adenopathy.      Upper Body:      Right upper body: No supraclavicular or axillary adenopathy.      Left upper body: No supraclavicular or axillary adenopathy.      Lower Body: No right inguinal adenopathy. No left inguinal adenopathy.   Skin:     General: Skin is warm.      Coloration: Skin is not jaundiced.      Findings: No lesion or rash.      Comments: Well healed surgical scar.  No evidence of recurrence at primary site.   Neurological:      General: No focal deficit present.      Mental Status: She is alert and oriented to person, place, and time.      Cranial Nerves: No cranial nerve deficit.      Motor: No weakness.      Gait: Gait normal.   Psychiatric:         Mood and Affect: Mood normal.         Behavior: Behavior normal.         Thought Content: Thought content normal.         Judgment: Judgment normal.         I  reviewed lab data in the chart.    Soft tissue left upper arm excision lipoma. Lymph node, left axillary sentinel lymph node.    1. Two lymph nodes. One lymph node with rare solitary cells less than 0.1 mm in diameter suspicious though not definitive for metastatic melanoma cells in 1 out of 2 lymph nodes in addition, there is an incidental capsular/trabecular nevus. The cells are rare, solitary suspicious metastatic melanoma cells positive for Mart one and HMB-45, however, these are, these suspicious cells are few in number and significant cytologic atypia is not seen. It was sent out for consult. Consult comes back as a final diagnosis from 1/22/2024, demonstrating skin.     Lymph node, sentinel left axillary lymph node.   No evidence of metastatic melanoma in the two lymph nodes. Capsular nevus.    Skin left upper arm excision, residual focal atypical dermal melanocytic proliferation, consistent with residual melanoma, thickness 0.7 mm, not seen and examined, inked. Specimen margins and prior procedure site changes present.    WBC   Date Value Ref Range Status   01/05/2024 6.51 4.31 - 10.16 Thousand/uL Final   08/29/2022 7.25 4.31 - 10.16 Thousand/uL Final   06/04/2020 8.54 4.31 - 10.16 Thousand/uL Final   03/14/2014 7.09 4.31 - 10.16 Thousand/uL Final     Comment:     New Reference Range     Hemoglobin   Date Value Ref Range Status   01/05/2024 12.3 11.5 - 15.4 g/dL Final   08/29/2022 12.5 11.5 - 15.4 g/dL Final   06/04/2020 11.5 11.5 - 15.4 g/dL Final   03/14/2014 13.3 11.5 - 15.4 g/dL Final     Comment:     New Reference Range     Platelets   Date Value Ref Range Status   01/05/2024 274 149 - 390 Thousands/uL Final   08/29/2022 295 149 - 390 Thousands/uL Final   06/04/2020 242 149 - 390 Thousands/uL Final   03/14/2014 342 149 - 390 Thousand/uL Final     Comment:     New Reference Range     MCV   Date Value Ref Range Status   01/05/2024 88 82 - 98 fL Final   08/29/2022 90 82 - 98 fL Final   06/04/2020 90 82 -  98 fL Final   03/14/2014 88 82 - 98 fL Final     Comment:     New Reference Range      Sodium   Date Value Ref Range Status   03/14/2014 137 135 - 145 mmol/L Final     Potassium   Date Value Ref Range Status   01/05/2024 4.0 3.5 - 5.3 mmol/L Final   02/08/2023 4.1 3.5 - 5.3 mmol/L Final   08/29/2022 3.7 3.5 - 5.3 mmol/L Final   03/14/2014 4.3 3.6 - 5.0 mmol/L Final     Chloride   Date Value Ref Range Status   01/05/2024 104 96 - 108 mmol/L Final   02/08/2023 104 96 - 108 mmol/L Final   08/29/2022 102 96 - 108 mmol/L Final   03/14/2014 103 101 - 111 mmol/L Final     CO2   Date Value Ref Range Status   01/05/2024 26 21 - 32 mmol/L Final   02/08/2023 26 21 - 32 mmol/L Final   08/29/2022 26 21 - 32 mmol/L Final   03/14/2014 27 21 - 31 mmol/L Final     BUN   Date Value Ref Range Status   01/05/2024 13 5 - 25 mg/dL Final   02/08/2023 11 5 - 25 mg/dL Final   08/29/2022 10 5 - 25 mg/dL Final   03/14/2014 11 5 - 27 mg/dL Final     Creatinine   Date Value Ref Range Status   01/05/2024 0.79 0.60 - 1.30 mg/dL Final     Comment:     Standardized to IDMS reference method   02/08/2023 0.85 0.60 - 1.30 mg/dL Final     Comment:     Standardized to IDMS reference method   08/29/2022 0.84 0.60 - 1.30 mg/dL Final     Comment:     Standardized to IDMS reference method   03/14/2014 0.72 0.60 - 1.30 mg/dL Final     Comment:     Standardized to IDMS reference method     Glucose   Date Value Ref Range Status   08/29/2022 98 65 - 140 mg/dL Final     Comment:     If the patient is fasting, the ADA then defines impaired fasting glucose as > 100 mg/dL and diabetes as > or equal to 123 mg/dL.  Specimen collection should occur prior to Sulfasalazine administration due to the potential for falsely depressed results. Specimen collection should occur prior to Sulfapyridine administration due to the potential for falsely elevated results.   06/04/2020 120 65 - 140 mg/dL Final     Comment:       If the patient is fasting, the ADA then defines impaired  fasting glucose as > 100 mg/dL and diabetes as > or equal to 123 mg/dL.  Specimen collection should occur prior to Sulfasalazine administration due to the potential for falsely depressed results. Specimen collection should occur prior to Sulfapyridine administration due to the potential for falsely elevated results.     Calcium   Date Value Ref Range Status   01/05/2024 9.0 8.4 - 10.2 mg/dL Final   02/08/2023 8.8 8.4 - 10.2 mg/dL Final   08/29/2022 8.9 8.3 - 10.1 mg/dL Final   03/14/2014 8.9 8.3 - 10.1 mg/dL Final     Total Protein   Date Value Ref Range Status   03/14/2014 7.5 6.4 - 8.2 g/dL Final     Albumin   Date Value Ref Range Status   01/05/2024 4.1 3.5 - 5.0 g/dL Final   02/08/2023 4.1 3.5 - 5.0 g/dL Final   08/29/2022 3.6 3.5 - 5.0 g/dL Final   03/14/2014 3.9 3.5 - 5.0 g/dL Final     Total Bilirubin   Date Value Ref Range Status   01/05/2024 0.57 0.20 - 1.00 mg/dL Final     Comment:     Use of this assay is not recommended for patients undergoing treatment with eltrombopag due to the potential for falsely elevated results.  N-acetyl-p-benzoquinone imine (metabolite of Acetaminophen) will generate erroneously low results in samples for patients that have taken an overdose of Acetaminophen.   02/08/2023 0.66 0.20 - 1.00 mg/dL Final   08/29/2022 0.43 0.20 - 1.00 mg/dL Final     Comment:     Use of this assay is not recommended for patients undergoing treatment with eltrombopag due to the potential for falsely elevated results.     Alkaline Phosphatase   Date Value Ref Range Status   01/05/2024 59 34 - 104 U/L Final   02/08/2023 64 34 - 104 U/L Final   08/29/2022 70 46 - 116 U/L Final   03/14/2014 83 42 - 121 U/L Final     AST   Date Value Ref Range Status   01/05/2024 13 13 - 39 U/L Final   02/08/2023 14 13 - 39 U/L Final     Comment:     Specimen collection should occur prior to Sulfasalazine administration due to the potential for falsely depressed results.    08/29/2022 15 5 - 45 U/L Final     Comment:      "Specimen collection should occur prior to Sulfasalazine administration due to the potential for falsely depressed results.    03/14/2014 19 10 - 42 U/L Final     ALT   Date Value Ref Range Status   01/05/2024 21 7 - 52 U/L Final     Comment:     Specimen collection should occur prior to Sulfasalazine administration due to the potential for falsely depressed results.    02/08/2023 19 7 - 52 U/L Final     Comment:     Specimen collection should occur prior to Sulfasalazine administration due to the potential for falsely depressed results.    08/29/2022 32 12 - 78 U/L Final     Comment:     Specimen collection should occur prior to Sulfasalazine administration due to the potential for falsely depressed results.    03/14/2014 30 6 - 78 U/L Final      LD   Date Value Ref Range Status   01/05/2024 144 140 - 271 U/L Final     No results found for: \"TSH\"  No results found for: \"Y5ULLYT\"   No results found for: \"FREET4\"      RECENT IMAGING:  Procedure: NM lymphatic melanoma    Result Date: 1/12/2024  Narrative: SENTINEL NODE LYMPHOSCINTIGRAPHY INDICATION:   Left posterior shoulder melanoma. Localize sentinel node FINDINGS: 0.53 mCi Tc-99m sulfur colloid (0.6 cc volume) was administered intradermally in divided doses by myself intradermally in the region of the patient's left shoulder melanoma.  Scintigraphic images were obtained over the thorax in multiple projections. Left axillary lymph node is seen. Using scintigraphic guidance, the corresponding skin site was marked with an indelible marker.  The patient was transferred to the operating room in satisfactory condition.     Impression: [place] lymph node localized to the left axilla. Workstation performed: [id number]     Procedure: XR chest pa & lateral    Result Date: 1/5/2024  Narrative: CHEST INDICATION:   Malignant melanoma of left upper limb, including shoulder. Melanocytic nevi, unspecified. Benign lipomatous neoplasm of skin and subcutaneous tissue of left arm. " COMPARISON:  Comparison made with previous examination(s) dated (DX) 18-Jan-2020. EXAM PERFORMED/VIEWS:  XR CHEST PA & LATERAL Images: 2 FINDINGS: Cardiomediastinal silhouette appears unremarkable. The lungs are clear.  No pneumothorax or pleural effusion. Osseous structures appear within normal limits for patient age.     Impression: No acute cardiopulmonary disease. Electronically signed: 01/05/2024 02:59 PM Hussain Syed MPH,MD           Assessment/Plan  Ms. Herron is a 38-year-old female with stage Ib melanoma here for continued monitoring, follow-up and surveillance.    1. Malignant melanoma of left upper extremity including shoulder (HCC)  -     CBC and differential; Future; Expected date: 04/24/2024  -     Comprehensive metabolic panel; Future; Expected date: 04/24/2024  -     LD,Blood; Future; Expected date: 04/24/2024    2. Encounter for follow-up surveillance of melanoma  Assessment & Plan:  She is doing well and recovering from surgery on 01/12/2024. We discussed that the final pathology demonstrates no residual melanoma remaining and negative margins. Also discussed that there is no lymphadenopathy or lymph node involvement. As such, she is a stage 1B and we will continue monitoring her closely for disease recurrence or metastasis. We discussed that we will follow her every 6 months. She will return in 6 months for full body evaluation, blood work. Orders placed and scripts provided for blood work to be done. She knows to call with issues or concerns prior to her next visit.      Orders:  -     CBC and differential; Future; Expected date: 04/24/2024  -     Comprehensive metabolic panel; Future; Expected date: 04/24/2024  -     LD,Blood; Future; Expected date: 04/24/2024    She is scheduled to see Dr. Garcia in 6 months, so she will see us in 3 months and then we will see her every 6 months after that this way there is staggering between my appointment and appointment with Dr. Garcia.    Return in about 3  months (around 4/24/2024) for Office Visit, labs.     Transcribed for Alaina Clarke MD, by Mady Frederick on 01/24/24 at 5:57 PM. Powered by Dragon [employer].

## 2024-01-24 NOTE — ASSESSMENT & PLAN NOTE
She is doing well and recovering from surgery on 01/12/2024. We discussed that the final pathology demonstrates no residual melanoma remaining and negative margins. Also discussed that there is no lymphadenopathy or lymph node involvement. As such, she is a stage 1B and we will continue monitoring her closely for disease recurrence or metastasis. We discussed that we will follow her every 6 months. She will return in 6 months for full body evaluation, blood work. Orders placed and scripts provided for blood work to be done. She knows to call with issues or concerns prior to her next visit.

## 2024-03-18 ENCOUNTER — ANNUAL EXAM (OUTPATIENT)
Age: 39
End: 2024-03-18
Payer: COMMERCIAL

## 2024-03-18 VITALS
BODY MASS INDEX: 44.17 KG/M2 | HEIGHT: 67 IN | WEIGHT: 281.4 LBS | SYSTOLIC BLOOD PRESSURE: 122 MMHG | DIASTOLIC BLOOD PRESSURE: 70 MMHG

## 2024-03-18 DIAGNOSIS — Z01.419 ENCOUNTER FOR ANNUAL ROUTINE GYNECOLOGICAL EXAMINATION: Primary | ICD-10-CM

## 2024-03-18 DIAGNOSIS — Z12.4 SCREENING FOR CERVICAL CANCER: ICD-10-CM

## 2024-03-18 DIAGNOSIS — Z11.51 SCREENING FOR HUMAN PAPILLOMAVIRUS (HPV): ICD-10-CM

## 2024-03-18 DIAGNOSIS — Z11.3 SCREENING FOR STD (SEXUALLY TRANSMITTED DISEASE): ICD-10-CM

## 2024-03-18 PROCEDURE — 87591 N.GONORRHOEAE DNA AMP PROB: CPT | Performed by: OBSTETRICS & GYNECOLOGY

## 2024-03-18 PROCEDURE — G0476 HPV COMBO ASSAY CA SCREEN: HCPCS | Performed by: OBSTETRICS & GYNECOLOGY

## 2024-03-18 PROCEDURE — S0612 ANNUAL GYNECOLOGICAL EXAMINA: HCPCS | Performed by: OBSTETRICS & GYNECOLOGY

## 2024-03-18 PROCEDURE — G0145 SCR C/V CYTO,THINLAYER,RESCR: HCPCS | Performed by: OBSTETRICS & GYNECOLOGY

## 2024-03-18 PROCEDURE — 87491 CHLMYD TRACH DNA AMP PROBE: CPT | Performed by: OBSTETRICS & GYNECOLOGY

## 2024-03-18 NOTE — PROGRESS NOTES
Sandrita Herron  1985      CC:  Yearly exam    S:  38 y.o. female here for yearly exam. Her cycles are regular every 24 days, lasting 5 days, sometimes 38days.     Recent diagnosis of melanoma.     She denies vaginal discharge, itching, pelvic pain.   She has no urinary concerns, does have stress incontinence.  No bowel concerns.  No breast concerns.     Sexual activity: She is sexually active without pain, bleeding or dryness.   Monogamous.   Open to STD screening today.     Contraception: None (Discussed that pregnancy on spironolactone is not recommended, also considering GLP-1, which are not recommended in pregnancy) Not trying for pregnancy, but not preventing.     Last Pap: 9/24/20 - NILM, Neg HPV  Last Mammo: 5/24/23 - BIRad 3 (benign biopsy 12/2023) - follow up age 41    We reviewed [facility] guidelines for Pap testing today.     Family hx of breast cancer: no  Family hx of ovarian cancer: no  Family hx of colon cancer: no      Current Outpatient Medications:     albuterol (Ventolin HFA) 90 mcg/act inhaler, Inhale 2 puffs every 4 (four) hours as needed for wheezing or shortness of breath, Disp: 18 g, Rfl: 0    clindamycin (CLEOCIN T) 1 % lotion, Apply topically 2 (two) times a day, Disp: 60 mL, Rfl: 3    metFORMIN (GLUCOPHAGE) 500 mg tablet, Take one pill for one month. If tolerating well, increase to two pills daily. Limit alcohol while on this medication. (Patient taking differently: Take 500 mg by mouth 2 (two) times a day with meals), Disp: 60 tablet, Rfl: 4    spironolactone (ALDACTONE) 50 mg tablet, Take 1 pill (50 mg) once daily for two weeks. Take with large glass of water. If well-tolerated, increase to 2 pills (100 mg) daily. Avoid large amounts of high potassium foods while on this medication., Disp: 60 tablet, Rfl: 3    traMADol (Ultram) 50 mg tablet, Take 1 tablet (50 mg total) by mouth every 6 (six) hours as needed for moderate pain (Patient not taking: Reported on 1/23/2024), Disp: 10  "tablet, Rfl: 0  Patient Active Problem List   Diagnosis    Allergic rhinitis    Moderate persistent asthma    Fatigue    Migraine without status migrainosus, not intractable    Seasonal affective disorder (HCC)    Hyperglycemia    Dyslipidemia    Morbid obesity with BMI of 40.0-44.9, adult (HCC)    Mass of left breast    Back skin lesion    Malignant melanoma of left upper extremity including shoulder (HCC)    Lipoma    Encounter for follow-up surveillance of melanoma     Past Medical History:   Diagnosis Date    Asthma     had vaccine     Cancer (HCC)     melanoma    Diabetes mellitus (HCC)     gestational DM diet controlled    Infertility 02/03/2017    only got  sperm tested, naturally conceived     Lumbar strain 07/16/2015    Melanoma (HCC)     Punch Bx left upper extremity    Pneumonia     Varicella     chicken pox as child    Visual impairment     eyewear      Family History   Problem Relation Age of Onset    Multiple sclerosis Mother     Skin cancer Mother     Cancer Mother     Other Mother         Myocardial bridge    Diabetes Father     Other Father         Pericarditis    Miscarriages / Stillbirths Sister     Asthma Maternal Grandmother     Diabetes Paternal Grandmother     No Known Problems Son     Learning disabilities Other     No Known Problems Paternal Aunt     No Known Problems Maternal Aunt     Breast cancer Neg Hx     Colon cancer Neg Hx     Ovarian cancer Neg Hx     Uterine cancer Neg Hx     Cervical cancer Neg Hx       Review of Systems   Respiratory: Negative.    Cardiovascular: Negative.    Gastrointestinal: Negative for constipation and diarrhea.     O:  Height 5' 7\" (1.702 m), weight 128 kg (281 lb 6.4 oz), last menstrual period 03/03/2024, not currently breastfeeding.    Patient appears well and is not in distress  Breasts are symmetrical without mass, tenderness, nipple discharge, skin changes or adenopathy.   Abdomen is soft and nontender without masses.   External genitals are " normal without lesions or rashes.  Urethral meatus and urethra are normal  Bladder is normal to palpation  Vagina is normal without discharge or bleeding.   Cervix is normal without discharge or lesion.   Uterus is normal, mobile, nontender without palpable mass.  Adnexa are normal, nontender, without palpable mass.     A:  Yearly exam.     P:   Pap & HPV with cultures today   Mammo age 41   RTO one year for yearly exam or sooner as needed.

## 2024-03-19 LAB
HPV HR 12 DNA CVX QL NAA+PROBE: NEGATIVE
HPV16 DNA CVX QL NAA+PROBE: NEGATIVE
HPV18 DNA CVX QL NAA+PROBE: NEGATIVE

## 2024-03-20 LAB
C TRACH DNA SPEC QL NAA+PROBE: NEGATIVE
N GONORRHOEA DNA SPEC QL NAA+PROBE: NEGATIVE

## 2024-03-26 LAB
LAB AP GYN PRIMARY INTERPRETATION: NORMAL
Lab: NORMAL
PATH INTERP SPEC-IMP: NORMAL

## 2024-04-18 ENCOUNTER — TELEPHONE (OUTPATIENT)
Dept: HEMATOLOGY ONCOLOGY | Facility: CLINIC | Age: 39
End: 2024-04-18

## 2024-04-18 NOTE — TELEPHONE ENCOUNTER
Patients voicemail was full.  I sent a message through [employer] to call back to reschedule her appointment with Dr. Clarke.

## 2024-05-29 ENCOUNTER — TELEPHONE (OUTPATIENT)
Dept: SURGICAL ONCOLOGY | Facility: CLINIC | Age: 39
End: 2024-05-29

## 2024-05-29 NOTE — TELEPHONE ENCOUNTER
I called and left a message for patient to call us back at [phone] to reschedule her apt with Dr Garcia on July 23rd due to Dr Garcia going into surgery.

## 2024-05-31 ENCOUNTER — OFFICE VISIT (OUTPATIENT)
Dept: FAMILY MEDICINE CLINIC | Facility: CLINIC | Age: 39
End: 2024-05-31
Payer: COMMERCIAL

## 2024-05-31 VITALS
BODY MASS INDEX: 44.42 KG/M2 | DIASTOLIC BLOOD PRESSURE: 82 MMHG | WEIGHT: 283 LBS | SYSTOLIC BLOOD PRESSURE: 124 MMHG | HEART RATE: 86 BPM | HEIGHT: 67 IN

## 2024-05-31 DIAGNOSIS — C43.62 MALIGNANT MELANOMA OF LEFT UPPER EXTREMITY INCLUDING SHOULDER (HCC): Primary | ICD-10-CM

## 2024-05-31 DIAGNOSIS — G43.909 MIGRAINE WITHOUT STATUS MIGRAINOSUS, NOT INTRACTABLE, UNSPECIFIED MIGRAINE TYPE: ICD-10-CM

## 2024-05-31 DIAGNOSIS — E78.5 DYSLIPIDEMIA: ICD-10-CM

## 2024-05-31 DIAGNOSIS — E66.01 MORBID OBESITY WITH BMI OF 40.0-44.9, ADULT (HCC): ICD-10-CM

## 2024-05-31 DIAGNOSIS — J45.40 MODERATE PERSISTENT ASTHMA WITHOUT COMPLICATION: ICD-10-CM

## 2024-05-31 DIAGNOSIS — L73.2 HIDRADENITIS SUPPURATIVA: ICD-10-CM

## 2024-05-31 DIAGNOSIS — T50.905A ADVERSE EFFECT OF DRUG, INITIAL ENCOUNTER: ICD-10-CM

## 2024-05-31 DIAGNOSIS — F33.8 SEASONAL AFFECTIVE DISORDER (HCC): ICD-10-CM

## 2024-05-31 DIAGNOSIS — R73.9 HYPERGLYCEMIA: ICD-10-CM

## 2024-05-31 PROBLEM — N63.20 MASS OF LEFT BREAST: Status: RESOLVED | Noted: 2023-11-22 | Resolved: 2024-05-31

## 2024-05-31 PROCEDURE — 99214 OFFICE O/P EST MOD 30 MIN: CPT | Performed by: FAMILY MEDICINE

## 2024-05-31 RX ORDER — SPIRONOLACTONE 50 MG/1
TABLET, FILM COATED ORAL
Qty: 180 TABLET | Refills: 3 | Status: SHIPPED | OUTPATIENT
Start: 2024-05-31

## 2024-05-31 RX ORDER — SPIRONOLACTONE 50 MG/1
TABLET, FILM COATED ORAL
Qty: 60 TABLET | Refills: 3 | Status: SHIPPED | OUTPATIENT
Start: 2024-05-31 | End: 2024-05-31 | Stop reason: SDUPTHER

## 2024-05-31 RX ORDER — ONDANSETRON 8 MG/1
8 TABLET, ORALLY DISINTEGRATING ORAL EVERY 8 HOURS PRN
Qty: 10 TABLET | Refills: 0 | Status: SHIPPED | OUTPATIENT
Start: 2024-05-31 | End: 2024-06-03

## 2024-05-31 RX ORDER — ALBUTEROL SULFATE 90 UG/1
2 AEROSOL, METERED RESPIRATORY (INHALATION) EVERY 4 HOURS PRN
Qty: 18 G | Refills: 0 | Status: SHIPPED | OUTPATIENT
Start: 2024-05-31

## 2024-05-31 NOTE — ASSESSMENT & PLAN NOTE
Patient was treated with metformin and had GI upset could not tolerate and is now on spironolactone per dermatology.  Request refill, given

## 2024-05-31 NOTE — PATIENT INSTRUCTIONS
Follow with all specialist per their instructions  Diet exercise weight loss recommended  Follow with weight management they will call you with appointment  Complete pulmonary function testing  Return in 6 months for office visit and blood work sooner if needed

## 2024-05-31 NOTE — PROGRESS NOTES
Ambulatory Visit  Name: Sandrita Herron      : 1985      MRN: [id number]  Encounter Provider: Du Washburn DO  Encounter Date: 2024   Encounter department: [facility]    1.  Malignant melanoma left upper extremity follows with surgical medical oncology  2.  Asthma, stable albuterol ordered PFT ordered  3.  Hidradenitis suppurativa, spironolactone reordered  4.  Adverse drug reaction patient cannot tolerate metformin secondary to GI side effects  5.  Seasonal affective disorder, resolved  6.  Dyslipidemia blood work ordered #7.  Hyperglycemia blood work ordered  8.  BMI 44.31 refer to weight management  9.  Migraine, Zofran reordered #10.  Return in 6 months for office visit and blood work sooner if needed        Assessment & Plan   1. Malignant melanoma of left upper extremity including shoulder (HCC)  Assessment & Plan:  Patient follow with surgical and medical oncology  Orders:  -     CBC; Future; Expected date: 2024  -     Comprehensive metabolic panel; Future; Expected date: 2024  -     Hemoglobin A1C; Future; Expected date: 2024  -     Lipid Panel with Direct LDL reflex; Future; Expected date: 2024  -     TSH, 3rd generation with Free T4 reflex; Future; Expected date: 2024  -     UA (URINE) with reflex to Scope; Future; Expected date: 2024  2. Moderate persistent asthma without complication  Assessment & Plan:  EFT ordered, albuterol reordered  Orders:  -     albuterol (Ventolin HFA) 90 mcg/act inhaler; Inhale 2 puffs every 4 (four) hours as needed for wheezing or shortness of breath  -     Complete PFT with post bronchodilator; Future  -     CBC; Future; Expected date: 2024  -     Comprehensive metabolic panel; Future; Expected date: 2024  -     Hemoglobin A1C; Future; Expected date: 2024  -     Lipid Panel with Direct LDL reflex; Future; Expected date: 2024  -     TSH, 3rd generation with Free T4 reflex;  Future; Expected date: 12/01/2024  -     UA (URINE) with reflex to Scope; Future; Expected date: 12/01/2024  3. Hidradenitis suppurativa  Assessment & Plan:  Patient was treated with metformin and had GI upset could not tolerate and is now on spironolactone per dermatology.  Request refill, given  Orders:  -     spironolactone (ALDACTONE) 50 mg tablet; Take 1 pill (50 mg) once daily for two weeks. Take with large glass of water. If well-tolerated, increase to 2 pills (100 mg) daily. Avoid large amounts of high potassium foods while on this medication.  -     CBC; Future; Expected date: 12/01/2024  -     Comprehensive metabolic panel; Future; Expected date: 12/01/2024  -     Hemoglobin A1C; Future; Expected date: 12/01/2024  -     Lipid Panel with Direct LDL reflex; Future; Expected date: 12/01/2024  -     TSH, 3rd generation with Free T4 reflex; Future; Expected date: 12/01/2024  -     UA (URINE) with reflex to Scope; Future; Expected date: 12/01/2024  4. Adverse effect of drug, initial encounter  -     CBC; Future; Expected date: 12/01/2024  -     Comprehensive metabolic panel; Future; Expected date: 12/01/2024  -     Hemoglobin A1C; Future; Expected date: 12/01/2024  -     Lipid Panel with Direct LDL reflex; Future; Expected date: 12/01/2024  -     TSH, 3rd generation with Free T4 reflex; Future; Expected date: 12/01/2024  -     UA (URINE) with reflex to Scope; Future; Expected date: 12/01/2024  5. Seasonal affective disorder (HCC)  Assessment & Plan:  Resolved  Orders:  -     CBC; Future; Expected date: 12/01/2024  -     Comprehensive metabolic panel; Future; Expected date: 12/01/2024  -     Hemoglobin A1C; Future; Expected date: 12/01/2024  -     Lipid Panel with Direct LDL reflex; Future; Expected date: 12/01/2024  -     TSH, 3rd generation with Free T4 reflex; Future; Expected date: 12/01/2024  -     UA (URINE) with reflex to Scope; Future; Expected date: 12/01/2024  6. Dyslipidemia  Assessment & Plan:  Blood  work ordered  Orders:  -     CBC; Future; Expected date: 12/01/2024  -     Comprehensive metabolic panel; Future; Expected date: 12/01/2024  -     Hemoglobin A1C; Future; Expected date: 12/01/2024  -     Lipid Panel with Direct LDL reflex; Future; Expected date: 12/01/2024  -     TSH, 3rd generation with Free T4 reflex; Future; Expected date: 12/01/2024  -     UA (URINE) with reflex to Scope; Future; Expected date: 12/01/2024  7. Hyperglycemia  Assessment & Plan:  Patient to follow a low sugar low carbohydrate diet blood work ordered  Orders:  -     CBC; Future; Expected date: 12/01/2024  -     Comprehensive metabolic panel; Future; Expected date: 12/01/2024  -     Hemoglobin A1C; Future; Expected date: 12/01/2024  -     Lipid Panel with Direct LDL reflex; Future; Expected date: 12/01/2024  -     TSH, 3rd generation with Free T4 reflex; Future; Expected date: 12/01/2024  -     UA (URINE) with reflex to Scope; Future; Expected date: 12/01/2024  8. Morbid obesity with BMI of 40.0-44.9, adult ([facility])  Assessment & Plan:  Refer to weight management  Orders:  -     Ambulatory Referral to Weight Management; Future  -     CBC; Future; Expected date: 12/01/2024  -     Comprehensive metabolic panel; Future; Expected date: 12/01/2024  -     Hemoglobin A1C; Future; Expected date: 12/01/2024  -     Lipid Panel with Direct LDL reflex; Future; Expected date: 12/01/2024  -     TSH, 3rd generation with Free T4 reflex; Future; Expected date: 12/01/2024  -     UA (URINE) with reflex to Scope; Future; Expected date: 12/01/2024  9. Migraine without status migrainosus, not intractable, unspecified migraine type  Assessment & Plan:  Zofran reordered  Orders:  -     ondansetron (ZOFRAN-ODT) 8 mg disintegrating tablet; Take 1 tablet (8 mg total) by mouth every 8 (eight) hours as needed for nausea for up to 3 days  -     CBC; Future; Expected date: 12/01/2024  -     Comprehensive metabolic panel; Future; Expected date: 12/01/2024  -      "Hemoglobin A1C; Future; Expected date: 12/01/2024  -     Lipid Panel with Direct LDL reflex; Future; Expected date: 12/01/2024  -     TSH, 3rd generation with Free T4 reflex; Future; Expected date: 12/01/2024  -     UA (URINE) with reflex to Scope; Future; Expected date: 12/01/2024      Depression Screening and Follow-up Plan: Patient was screened for depression during today's encounter. They screened negative with a PHQ-9 score of 4.      History of Present Illness     Patient is here for routine follow-up.  Patient wishes for pharmacologic assistance with weight loss.  Refer to weight management.  Patient has hidradenitis suppurativa was started on metformin by dermatology however could not tolerate secondary GI upset.  Is now on spironolactone and stable.  Request refill.  Blood work from January was discussed with the patient        Review of Systems   Constitutional: Negative.    HENT: Negative.     Eyes: Negative.    Respiratory: Negative.     Cardiovascular: Negative.    Gastrointestinal: Negative.    Endocrine: Negative.    Genitourinary: Negative.    Musculoskeletal: Negative.    Skin:         HPI   Allergic/Immunologic: Negative.    Neurological: Negative.    Hematological: Negative.    Psychiatric/Behavioral: Negative.         Objective     /82 (BP Location: Right arm, Patient Position: Sitting, Cuff Size: Adult)   Pulse 86   Ht 5' 7\" (1.702 m)   Wt 128 kg (283 lb)   BMI 44.32 kg/m²     Physical Exam  Vitals and nursing note reviewed.   Constitutional:       Appearance: Normal appearance. She is obese.   HENT:      Head: Normocephalic and atraumatic.      Mouth/Throat:      Mouth: Mucous membranes are moist.   Eyes:      General: No scleral icterus.  Neck:      Vascular: No carotid bruit.   Cardiovascular:      Rate and Rhythm: Normal rate and regular rhythm.      Heart sounds: Normal heart sounds.   Pulmonary:      Effort: Pulmonary effort is normal.      Breath sounds: Normal breath sounds. "   Abdominal:      General: Bowel sounds are normal.      Palpations: Abdomen is soft.      Tenderness: There is no abdominal tenderness.   Musculoskeletal:      Cervical back: Neck supple.      Right lower leg: No edema.      Left lower leg: No edema.   Skin:     General: Skin is warm and dry.   Neurological:      General: No focal deficit present.      Mental Status: She is alert.   Psychiatric:         Mood and Affect: Mood normal.       Administrative Statements

## 2024-06-20 ENCOUNTER — TELEPHONE (OUTPATIENT)
Dept: FAMILY MEDICINE CLINIC | Facility: CLINIC | Age: 39
End: 2024-06-20

## 2024-06-20 DIAGNOSIS — L73.2 HIDRADENITIS SUPPURATIVA: ICD-10-CM

## 2024-06-20 DIAGNOSIS — R73.9 HYPERGLYCEMIA: Primary | ICD-10-CM

## 2024-06-20 RX ORDER — METFORMIN HYDROCHLORIDE 500 MG/1
500 TABLET, EXTENDED RELEASE ORAL
Qty: 30 TABLET | Refills: 5 | Status: SHIPPED | OUTPATIENT
Start: 2024-06-20

## 2024-06-20 NOTE — TELEPHONE ENCOUNTER
Patient's metformin will help her mildly elevated sugar and hidradenitis.  It is not for weight control.  Please ascertain from the patient if she wants to go back on metformin because of those 2 things.  If she wants to try the metformin again I can use the extended release to alleviate some of the GI symptoms.

## 2024-06-20 NOTE — TELEPHONE ENCOUNTER
I sent in prescription of metformin  mg 1 daily.  If patient notes GI upset have her discontinue this medication and make appointment in office

## 2024-07-08 ENCOUNTER — TELEPHONE (OUTPATIENT)
Dept: SURGICAL ONCOLOGY | Facility: CLINIC | Age: 39
End: 2024-07-08

## 2024-07-08 NOTE — TELEPHONE ENCOUNTER
Second attempt made to get a hold of patient in regards to her 7/23/24 apt with Dr Garcia. Apt needs to be rescheduled due to Dr Garcia being out of the office that day. Left my direct number for patient to reach me.

## 2024-07-17 PROBLEM — Z85.820 PERSONAL HISTORY OF MALIGNANT MELANOMA: Status: ACTIVE | Noted: 2023-11-22

## 2024-07-18 ENCOUNTER — OFFICE VISIT (OUTPATIENT)
Dept: SURGICAL ONCOLOGY | Facility: CLINIC | Age: 39
End: 2024-07-18
Payer: COMMERCIAL

## 2024-07-18 VITALS
WEIGHT: 287 LBS | BODY MASS INDEX: 45.04 KG/M2 | OXYGEN SATURATION: 98 % | RESPIRATION RATE: 18 BRPM | SYSTOLIC BLOOD PRESSURE: 124 MMHG | HEIGHT: 67 IN | DIASTOLIC BLOOD PRESSURE: 84 MMHG | HEART RATE: 86 BPM | TEMPERATURE: 97.5 F

## 2024-07-18 DIAGNOSIS — Z85.820 ENCOUNTER FOR FOLLOW-UP SURVEILLANCE OF MELANOMA: Primary | ICD-10-CM

## 2024-07-18 DIAGNOSIS — D17.22 LIPOMA OF LEFT UPPER EXTREMITY: ICD-10-CM

## 2024-07-18 DIAGNOSIS — Z85.820 PERSONAL HISTORY OF MALIGNANT MELANOMA: ICD-10-CM

## 2024-07-18 DIAGNOSIS — Z08 ENCOUNTER FOR FOLLOW-UP SURVEILLANCE OF MELANOMA: Primary | ICD-10-CM

## 2024-07-18 PROCEDURE — 99214 OFFICE O/P EST MOD 30 MIN: CPT | Performed by: STUDENT IN AN ORGANIZED HEALTH CARE EDUCATION/TRAINING PROGRAM

## 2024-07-18 NOTE — ASSESSMENT & PLAN NOTE
Resected at time of melanoma resection. No evidence of recurrence. Will cont to monitor clinically

## 2024-07-18 NOTE — PROGRESS NOTES
Surgical Oncology Follow Up    [address] [facility]  [facility] [place]  [address] [facility] [address]  [place] [address]    Sandrita Herron  1985  [id number]      ASSESSMENT AND PLAN    1. Encounter for follow-up surveillance of melanoma  2. Personal history of malignant melanoma  Assessment & Plan:  Doing well. Continues to be diligent about suncare. Stressed importance of seeing dermatology as she has not seem them since surgery 6 mo ago. Also sent a note to Dr Hart that pt is overdue to be seen and pt will reach out to them. I will see her in 6 mo for standard surveillance. Pt to alert our team of any new lumps or bumps in the meantime.   3. Lipoma of left upper extremity  Assessment & Plan:  Resected at time of melanoma resection. No evidence of recurrence. Will cont to monitor clinically        Oncology History   Personal history of malignant melanoma   5/18/2023 Biopsy    Left upper arm, shave biopsy   Thickness 1.2mm  1 Mitoses   No ulceration        9/28/2023 -  Cancer Staged    Staging form: Melanoma of the Skin, AJCC 8th Edition  - Clinical stage from 9/28/2023: Stage IB (cT2a, cN0, cM0) - Signed by Alaina Clarke MD on 12/7/2023 11/22/2023 Initial Diagnosis    Malignant melanoma of left upper extremity including shoulder (HCC)     1/12/2024 Surgery    B. Lymph node, left axillary sentinel lymph node #1:     -Two lymph nodes; one lymph node with rare solitary cells (<0.1 mm in diameter) suspicious (though not definitive) for metastatic melanoma cells (1/2 lymph nodes) (see comment).     -Incidental capsular/trabecular nevus.        C. Skin, left upper arm, excision:     -Residual focal atypical dermal melanocytic proliferation, consistent with residual melanoma (thickness: 0.7 mm); not seen at examined inked specimen margins.     -Prior procedure site changes present.     1/23/2024 -  Cancer Staged    Staging form: Melanoma of the Skin, AJCC 8th Edition  -  Pathologic: Stage IB (pT2a, pN0, cM0) - Signed by Nisha Garcia MD on 1/23/2024           Staging: T2N0  Treatment history: WLE SLN 12/2023  Current treatment: postop  Disease status: obs    History of Present Illness: s/p WLE SLN as above. Residual eliazar with final stage T2a. Small melanocytes within node favored to be benign on expert consult. Pt doing well with no issues. Has not yet seen derm in f/u and hasn't seen them since before surgery. No concerns today. No systemic sx.     Review of Systems  Complete ROS Surg Onc:   Constitutional: The patient denies new or recent history of general fatigue, no recent weight loss, no change in appetite.   Eyes: No complaints of visual problems, no scleral icterus.   ENT: no complaints of ear pain, no hoarseness, no difficulty swallowing,  no tinnitus and no new masses in head, oral cavity, or neck.   Cardiovascular: No complaints of chest pain, no palpitations, no ankle edema.   Respiratory: No complaints of shortness of breath, no cough.   Gastrointestinal: No complaints of jaundice, no bloody stools, no pale stools.   Genitourinary: No complaints of dysuria, no hematuria, no nocturia, no frequent urination, no urethral discharge.   Musculoskeletal: No complaints of weakness, paralysis, joint stiffness or arthralgias.  Integumentary: No complaints of rash, no new lesions.   Neurological: No complaints of convulsions, no seizures, no dizziness.   Hematologic/Lymphatic: No complaints of easy bruising.   Endocrine:  No hot or cold intolerance.  No polydipsia, polyphagia, or polyuria.  Allergy/immunology:  No environmental allergies.  No food allergies.  Not immunocompromised.  Skin:  No pallor or rash.  No wound.        Patient Active Problem List   Diagnosis    Allergic rhinitis    Moderate persistent asthma    Fatigue    Migraine without status migrainosus, not intractable    Hyperglycemia    Dyslipidemia    Morbid obesity with BMI of 40.0-44.9, adult (HCC)    Back skin  lesion    Personal history of malignant melanoma    Lipoma    Encounter for follow-up surveillance of melanoma    Hidradenitis suppurativa     Past Medical History:   Diagnosis Date    Asthma     had vaccine     Cancer (HCC)     melanoma    Diabetes mellitus (HCC)     gestational DM diet controlled    Infertility 02/03/2017    only got  sperm tested, naturally conceived     Lumbar strain 07/16/2015    Melanoma (HCC)     Punch Bx left upper extremity    Pneumonia     Varicella     chicken pox as child    Visual impairment     eyewear      Past Surgical History:   Procedure Laterality Date    FOOT SURGERY      LIPOMA RESECTION Left 1/12/2024    Procedure: LEFT UPPER ARM LIPOMA EXCISION;  Surgeon: Nisha Garcia MD;  [facility];  Service: Surgical Oncology    LYMPH NODE BIOPSY Left 1/12/2024    Procedure: SENTINEL LYMPH NODE BIOPSY (INJECT IN NUC MED BY RADIOLOGIST AT 11:30AM);  Surgeon: Nisha Garcia MD;  [facility];  Service: Surgical Oncology    SKIN LESION EXCISION Left 1/12/2024    Procedure: LEFT UPPER ARM MELANOMA WIDE LOCAL EXCISION;  Surgeon: Nisha Garcia MD;  [facility];  Service: Surgical Oncology    US GUIDED BREAST BIOPSY LEFT COMPLETE Left 12/13/2023    WISDOM TOOTH EXTRACTION       Family History   Problem Relation Age of Onset    Multiple sclerosis Mother     Skin cancer Mother     Cancer Mother     Other Mother         Myocardial bridge    Diabetes Father     Other Father         Pericarditis    Miscarriages / Stillbirths Sister     Asthma Maternal Grandmother     Diabetes Paternal Grandmother     No Known Problems Son     Learning disabilities Other     No Known Problems Paternal Aunt     No Known Problems Maternal Aunt     Breast cancer Neg Hx     Colon cancer Neg Hx     Ovarian cancer Neg Hx     Uterine cancer Neg Hx     Cervical cancer Neg Hx      Social History     Socioeconomic History    Marital status: /Civil Union     Spouse name: Not on  file    Number of children: Not on file    Years of education: Not on file    Highest education level: Not on file   Occupational History    Occupation: [employer]     Employer: [employer]   Tobacco Use    Smoking status: Former     Current packs/day: 0.00     Average packs/day: 0.5 packs/day for 12.5 years (6.3 ttl pk-yrs)     Types: Cigarettes     Start date:      Quit date: 2017     Years since quittin.0    Smokeless tobacco: Never   Vaping Use    Vaping status: Never Used   Substance and Sexual Activity    Alcohol use: Yes     Comment: barely    Drug use: No    Sexual activity: Yes     Partners: Male     Birth control/protection: None     Comment: withdrawl   Other Topics Concern    Not on file   Social History Narrative    Caffeine use     Social Determinants of Health     Financial Resource Strain: Not on file   Food Insecurity: Not on file   Transportation Needs: Not on file   Physical Activity: Not on file   Stress: Not on file   Social Connections: Not on file   Intimate Partner Violence: Not on file   Housing Stability: Not on file       Current Outpatient Medications:     albuterol (Ventolin HFA) 90 mcg/act inhaler, Inhale 2 puffs every 4 (four) hours as needed for wheezing or shortness of breath, Disp: 18 g, Rfl: 0    clindamycin (CLEOCIN T) 1 % lotion, Apply topically 2 (two) times a day, Disp: 60 mL, Rfl: 3    metFORMIN (GLUCOPHAGE-XR) 500 mg 24 hr tablet, Take 1 tablet (500 mg total) by mouth daily with dinner, Disp: 30 tablet, Rfl: 5    ondansetron (ZOFRAN-ODT) 8 mg disintegrating tablet, Take 1 tablet (8 mg total) by mouth every 8 (eight) hours as needed for nausea for up to 3 days, Disp: 10 tablet, Rfl: 0    spironolactone (ALDACTONE) 50 mg tablet, Take 1 pill (50 mg) once daily for two weeks. Take with large glass of water. If well-tolerated, increase to 2 pills (100 mg) daily. Avoid large amounts of high potassium foods while on this medication., Disp: 180 tablet, Rfl:  3  Allergies   Allergen Reactions    Sulfa Antibiotics Other (See Comments)     Pt does not know what happens when she takes was a child when had the allergy    Metformin GI Intolerance     Vitals:    07/18/24 1306   BP: 124/84   Pulse: 86   Resp: 18   Temp: 97.5 °F (36.4 °C)   SpO2: 98%       Physical Exam  Constitutional: Patient is well-developed and well-nourished who appears the stated age in no acute distress. Patient is pleasant and talkative.     HEENT:  Normocephalic.  Sclerae are anicteric. Mucous membranes are moist. Neck is supple without adenopathy. No JVD.     Chest: Equal chest rise, easy WOB  Cardiac: Heart is regular rate.     Abdomen: Abdomen is non-tender, non-distended and without masses.     Extremities: There is no clubbing or cyanosis. There is no edema.  Symmetric.  Neuro: Grossly nonfocal. Gait is normal.     Lymphatic: No evidence of cervical adenopathy bilaterally. No evidence of axillary adenopathy bilaterally. No evidence of inguinal adenopathy bilaterally.     Skin: Warm, anicteric.  LFT arm and axillary incisions healing well  Psych:  Patient is pleasant and talkative.    Pathology:  Final Diagnosis   A. Soft tissue, left upper arm, excision:     LIPOMA.            B. Lymph node, left axillary sentinel lymph node #1:     -Two lymph nodes; one lymph node with rare solitary cells (<0.1 mm in diameter) suspicious (though not definitive) for metastatic melanoma cells (1/2 lymph nodes) (see comment).     -Incidental capsular/trabecular nevus.     Comment: These rare, solitary suspicious metastatic melanoma cells are positive for both MART-1 and HMB45 immunostains, while the incidental capsular /trabecular nevus (S100+, MART-1+, HMB45-, PRAME-) does not express HMB45 immunostain. However, these suspicious cells are very few in number, and significant cytologic atypia is not seen; accordingly, a diagnosis of unequivocal metastatic melanoma cannot be made. This case was sent for expert  "dermatopathology consultation to Dr. Mcgarry (see \"Note\" section below), who believes these cells are benign in nature. Multiple levels examined.           C. Skin, left upper arm, excision:     -Residual focal atypical dermal melanocytic proliferation, consistent with residual melanoma (thickness: 0.7 mm); not seen at examined inked specimen margins.     -Prior procedure site changes present.       Labs:  Reviewed in [facility]    Imaging  NM lymphatic melanoma    Result Date: 1/12/2024  Narrative: SENTINEL NODE LYMPHOSCINTIGRAPHY INDICATION:   Left posterior shoulder melanoma. Localize sentinel node FINDINGS: 0.53 mCi Tc-99m sulfur colloid (0.6 cc volume) was administered intradermally in divided doses by myself intradermally in the region of the patient's left shoulder melanoma.  Scintigraphic images were obtained over the thorax in multiple projections. Left axillary lymph node is seen. Using scintigraphic guidance, the corresponding skin site was marked with an indelible marker.  The patient was transferred to the operating room in satisfactory condition.     Impression: Uneeda lymph node localized to the left axilla. Workstation performed: [id number]     XR chest pa & lateral    Result Date: 1/5/2024  Narrative: CHEST INDICATION:   Malignant melanoma of left upper limb, including shoulder. Melanocytic nevi, unspecified. Benign lipomatous neoplasm of skin and subcutaneous tissue of left arm. COMPARISON:  Comparison made with previous examination(s) dated (DX) 18-Jan-2020. EXAM PERFORMED/VIEWS:  XR CHEST PA & LATERAL Images: 2 FINDINGS: Cardiomediastinal silhouette appears unremarkable. The lungs are clear.  No pneumothorax or pleural effusion. Osseous structures appear within normal limits for patient age.     Impression: No acute cardiopulmonary disease. Electronically signed: 01/05/2024 02:59 PM Hussain Syed, MPH,MD      I reviewed the above laboratory and imaging data incl derm notes, med onc, " path, PCP visit

## 2024-07-18 NOTE — ASSESSMENT & PLAN NOTE
Doing well. Continues to be diligent about suncare. Stressed importance of seeing dermatology as she has not seem them since surgery 6 mo ago. Also sent a note to Dr Hart that pt is overdue to be seen and pt will reach out to them. I will see her in 6 mo for standard surveillance. Pt to alert our team of any new lumps or bumps in the meantime.

## 2024-07-18 NOTE — LETTER
July 18, 2024     Taylor Hart MD  [address] [facility] [address]    Patient: Sandrita Herron   YOB: 1985   Date of Visit: 7/18/2024       Dear Dr. Hart:    Thank you for referring Sandrita Herron to me for evaluation. Below are my notes for this consultation.    If you have questions, please do not hesitate to call me. I look forward to following your patient along with you.         Sincerely,        Nisha Garcia MD        CC: No Recipients    Nisha Garcia MD  7/18/2024  1:46 PM  Sign when Signing Visit  Surgical Oncology Follow Up    [address] [facility]  [facility] [place]  [address]  [place] [address]    Sandrita Herron  1985  [id number]      ASSESSMENT AND PLAN    1. Encounter for follow-up surveillance of melanoma  2. Personal history of malignant melanoma  Assessment & Plan:  Doing well. Continues to be diligent about suncare. Stressed importance of seeing dermatology as she has not seem them since surgery 6 mo ago. Also sent a note to Dr Hart that pt is overdue to be seen and pt will reach out to them. I will see her in 6 mo for standard surveillance. Pt to alert our team of any new lumps or bumps in the meantime.   3. Lipoma of left upper extremity  Assessment & Plan:  Resected at time of melanoma resection. No evidence of recurrence. Will cont to monitor clinically        Oncology History   Personal history of malignant melanoma   5/18/2023 Biopsy    Left upper arm, shave biopsy   Thickness 1.2mm  1 Mitoses   No ulceration        9/28/2023 -  Cancer Staged    Staging form: Melanoma of the Skin, AJCC 8th Edition  - Clinical stage from 9/28/2023: Stage IB (cT2a, cN0, cM0) - Signed by Alaina Clarke MD on 12/7/2023 11/22/2023 Initial Diagnosis    Malignant melanoma of left upper extremity including shoulder (HCC)     1/12/2024 Surgery    B. Lymph node, left axillary sentinel lymph node #1:     -Two lymph  nodes; one lymph node with rare solitary cells (<0.1 mm in diameter) suspicious (though not definitive) for metastatic melanoma cells (1/2 lymph nodes) (see comment).     -Incidental capsular/trabecular nevus.        C. Skin, left upper arm, excision:     -Residual focal atypical dermal melanocytic proliferation, consistent with residual melanoma (thickness: 0.7 mm); not seen at examined inked specimen margins.     -Prior procedure site changes present.     1/23/2024 -  Cancer Staged    Staging form: Melanoma of the Skin, AJCC 8th Edition  - Pathologic: Stage IB (pT2a, pN0, cM0) - Signed by Nisha Garcia MD on 1/23/2024           Staging: T2N0  Treatment history: WLE SLN 12/2023  Current treatment: postop  Disease status: obs    History of Present Illness: s/p WLE SLN as above. Residual eliazar with final stage T2a. Small melanocytes within node favored to be benign on expert consult. Pt doing well with no issues. Has not yet seen derm in f/u and hasn't seen them since before surgery. No concerns today. No systemic sx.     Review of Systems  Complete ROS Surg Onc:   Constitutional: The patient denies new or recent history of general fatigue, no recent weight loss, no change in appetite.   Eyes: No complaints of visual problems, no scleral icterus.   ENT: no complaints of ear pain, no hoarseness, no difficulty swallowing,  no tinnitus and no new masses in head, oral cavity, or neck.   Cardiovascular: No complaints of chest pain, no palpitations, no ankle edema.   Respiratory: No complaints of shortness of breath, no cough.   Gastrointestinal: No complaints of jaundice, no bloody stools, no pale stools.   Genitourinary: No complaints of dysuria, no hematuria, no nocturia, no frequent urination, no urethral discharge.   Musculoskeletal: No complaints of weakness, paralysis, joint stiffness or arthralgias.  Integumentary: No complaints of rash, no new lesions.   Neurological: No complaints of convulsions, no seizures,  no dizziness.   Hematologic/Lymphatic: No complaints of easy bruising.   Endocrine:  No hot or cold intolerance.  No polydipsia, polyphagia, or polyuria.  Allergy/immunology:  No environmental allergies.  No food allergies.  Not immunocompromised.  Skin:  No pallor or rash.  No wound.        Patient Active Problem List   Diagnosis   • Allergic rhinitis   • Moderate persistent asthma   • Fatigue   • Migraine without status migrainosus, not intractable   • Hyperglycemia   • Dyslipidemia   • Morbid obesity with BMI of 40.0-44.9, adult (HCC)   • Back skin lesion   • Personal history of malignant melanoma   • Lipoma   • Encounter for follow-up surveillance of melanoma   • Hidradenitis suppurativa     Past Medical History:   Diagnosis Date   • Asthma     had vaccine    • Cancer (HCC)     melanoma   • Diabetes mellitus (HCC)     gestational DM diet controlled   • Infertility 02/03/2017    only got  sperm tested, naturally conceived    • Lumbar strain 07/16/2015   • Melanoma (HCC)     Punch Bx left upper extremity   • Pneumonia    • Varicella     chicken pox as child   • Visual impairment     eyewear      Past Surgical History:   Procedure Laterality Date   • FOOT SURGERY     • LIPOMA RESECTION Left 1/12/2024    Procedure: LEFT UPPER ARM LIPOMA EXCISION;  Surgeon: Nisha Garcia MD;  [facility];  Service: Surgical Oncology   • LYMPH NODE BIOPSY Left 1/12/2024    Procedure: SENTINEL LYMPH NODE BIOPSY (INJECT IN NUC MED BY RADIOLOGIST AT 11:30AM);  Surgeon: Nisha Garcia MD;  [facility];  Service: Surgical Oncology   • SKIN LESION EXCISION Left 1/12/2024    Procedure: LEFT UPPER ARM MELANOMA WIDE LOCAL EXCISION;  Surgeon: Nisha Garcia MD;  [facility];  Service: Surgical Oncology   • US GUIDED BREAST BIOPSY LEFT COMPLETE Left 12/13/2023   • WISDOM TOOTH EXTRACTION       Family History   Problem Relation Age of Onset   • Multiple sclerosis Mother    • Skin cancer Mother    •  Cancer Mother    • Other Mother         Myocardial bridge   • Diabetes Father    • Other Father         Pericarditis   • Miscarriages / Stillbirths Sister    • Asthma Maternal Grandmother    • Diabetes Paternal Grandmother    • No Known Problems Son    • Learning disabilities Other    • No Known Problems Paternal Aunt    • No Known Problems Maternal Aunt    • Breast cancer Neg Hx    • Colon cancer Neg Hx    • Ovarian cancer Neg Hx    • Uterine cancer Neg Hx    • Cervical cancer Neg Hx      Social History     Socioeconomic History   • Marital status: /Civil Union     Spouse name: Not on file   • Number of children: Not on file   • Years of education: Not on file   • Highest education level: Not on file   Occupational History   • Occupation: [employer]     Employer: [employer]   Tobacco Use   • Smoking status: Former     Current packs/day: 0.00     Average packs/day: 0.5 packs/day for 12.5 years (6.3 ttl pk-yrs)     Types: Cigarettes     Start date:      Quit date: 2017     Years since quittin.0   • Smokeless tobacco: Never   Vaping Use   • Vaping status: Never Used   Substance and Sexual Activity   • Alcohol use: Yes     Comment: barely   • Drug use: No   • Sexual activity: Yes     Partners: Male     Birth control/protection: None     Comment: withdrawl   Other Topics Concern   • Not on file   Social History Narrative    Caffeine use     Social Determinants of Health     Financial Resource Strain: Not on file   Food Insecurity: Not on file   Transportation Needs: Not on file   Physical Activity: Not on file   Stress: Not on file   Social Connections: Not on file   Intimate Partner Violence: Not on file   Housing Stability: Not on file       Current Outpatient Medications:   •  albuterol (Ventolin HFA) 90 mcg/act inhaler, Inhale 2 puffs every 4 (four) hours as needed for wheezing or shortness of breath, Disp: 18 g, Rfl: 0  •  clindamycin (CLEOCIN T) 1 % lotion, Apply topically 2 (two) times a  day, Disp: 60 mL, Rfl: 3  •  metFORMIN (GLUCOPHAGE-XR) 500 mg 24 hr tablet, Take 1 tablet (500 mg total) by mouth daily with dinner, Disp: 30 tablet, Rfl: 5  •  ondansetron (ZOFRAN-ODT) 8 mg disintegrating tablet, Take 1 tablet (8 mg total) by mouth every 8 (eight) hours as needed for nausea for up to 3 days, Disp: 10 tablet, Rfl: 0  •  spironolactone (ALDACTONE) 50 mg tablet, Take 1 pill (50 mg) once daily for two weeks. Take with large glass of water. If well-tolerated, increase to 2 pills (100 mg) daily. Avoid large amounts of high potassium foods while on this medication., Disp: 180 tablet, Rfl: 3  Allergies   Allergen Reactions   • Sulfa Antibiotics Other (See Comments)     Pt does not know what happens when she takes was a child when had the allergy   • Metformin GI Intolerance     Vitals:    07/18/24 1306   BP: 124/84   Pulse: 86   Resp: 18   Temp: 97.5 °F (36.4 °C)   SpO2: 98%       Physical Exam  Constitutional: Patient is well-developed and well-nourished who appears the stated age in no acute distress. Patient is pleasant and talkative.     HEENT:  Normocephalic.  Sclerae are anicteric. Mucous membranes are moist. Neck is supple without adenopathy. No JVD.     Chest: Equal chest rise, easy WOB  Cardiac: Heart is regular rate.     Abdomen: Abdomen is non-tender, non-distended and without masses.     Extremities: There is no clubbing or cyanosis. There is no edema.  Symmetric.  Neuro: Grossly nonfocal. Gait is normal.     Lymphatic: No evidence of cervical adenopathy bilaterally. No evidence of axillary adenopathy bilaterally. No evidence of inguinal adenopathy bilaterally.     Skin: Warm, anicteric.  LFT arm and axillary incisions healing well  Psych:  Patient is pleasant and talkative.    Pathology:  Final Diagnosis   A. Soft tissue, left upper arm, excision:     LIPOMA.            B. Lymph node, left axillary sentinel lymph node #1:     -Two lymph nodes; one lymph node with rare solitary cells (<0.1 mm  "in diameter) suspicious (though not definitive) for metastatic melanoma cells (1/2 lymph nodes) (see comment).     -Incidental capsular/trabecular nevus.     Comment: These rare, solitary suspicious metastatic melanoma cells are positive for both MART-1 and HMB45 immunostains, while the incidental capsular /trabecular nevus (S100+, MART-1+, HMB45-, PRAME-) does not express HMB45 immunostain. However, these suspicious cells are very few in number, and significant cytologic atypia is not seen; accordingly, a diagnosis of unequivocal metastatic melanoma cannot be made. This case was sent for expert dermatopathology consultation to Dr. Mcgarry (see \"Note\" section below), who believes these cells are benign in nature. Multiple levels examined.           C. Skin, left upper arm, excision:     -Residual focal atypical dermal melanocytic proliferation, consistent with residual melanoma (thickness: 0.7 mm); not seen at examined inked specimen margins.     -Prior procedure site changes present.       Labs:  Reviewed in [employer]    Imaging  NM lymphatic melanoma    Result Date: 1/12/2024  Narrative: SENTINEL NODE LYMPHOSCINTIGRAPHY INDICATION:   Left posterior shoulder melanoma. Localize sentinel node FINDINGS: 0.53 mCi Tc-99m sulfur colloid (0.6 cc volume) was administered intradermally in divided doses by myself intradermally in the region of the patient's left shoulder melanoma.  Scintigraphic images were obtained over the thorax in multiple projections. Left axillary lymph node is seen. Using scintigraphic guidance, the corresponding skin site was marked with an indelible marker.  The patient was transferred to the operating room in satisfactory condition.     Impression: Baldwin lymph node localized to the left axilla. Workstation performed: [id number]     XR chest pa & lateral    Result Date: 1/5/2024  Narrative: CHEST INDICATION:   Malignant melanoma of left upper limb, including shoulder. Melanocytic nevi, " unspecified. Benign lipomatous neoplasm of skin and subcutaneous tissue of left arm. COMPARISON:  Comparison made with previous examination(s) dated (DX) 18-Jan-2020. EXAM PERFORMED/VIEWS:  XR CHEST PA & LATERAL Images: 2 FINDINGS: Cardiomediastinal silhouette appears unremarkable. The lungs are clear.  No pneumothorax or pleural effusion. Osseous structures appear within normal limits for patient age.     Impression: No acute cardiopulmonary disease. Electronically signed: 01/05/2024 02:59 PM Hussain Syed, MPH,MD      I reviewed the above laboratory and imaging data incl derm notes, med onc, path, PCP visit

## 2024-07-22 ENCOUNTER — TELEPHONE (OUTPATIENT)
Dept: DERMATOLOGY | Facility: CLINIC | Age: 39
End: 2024-07-22

## 2024-07-22 NOTE — TELEPHONE ENCOUNTER
LMOM advising pt of appt date/time/location/provider, to call the office to confirm or r/s....Per Dr Hart she can be scheduled in a PROC spot, urgent spot, or if we had to, can overbook on a Thursday PM if I only have 13 patients

## 2024-07-25 ENCOUNTER — OFFICE VISIT (OUTPATIENT)
Dept: DERMATOLOGY | Facility: CLINIC | Age: 39
End: 2024-07-25
Payer: COMMERCIAL

## 2024-07-25 VITALS — TEMPERATURE: 97.5 F

## 2024-07-25 DIAGNOSIS — Z85.820 PERSONAL HISTORY OF MALIGNANT MELANOMA: Primary | ICD-10-CM

## 2024-07-25 PROCEDURE — 99213 OFFICE O/P EST LOW 20 MIN: CPT | Performed by: DERMATOLOGY

## 2024-07-25 NOTE — PROGRESS NOTES
"[facility] Note     Patient Name: Sandrita Herron  Encounter Date: 7/25/24     Have you been cared for by a [facility] Dermatologist in the last 3 years and, if so, which description applies to you?    Yes.  I have been here within the last 3 years, and my medical history has NOT changed since that time.  I am FEMALE/of child-bearing potential.    REVIEW OF SYSTEMS:  Have you recently had or currently have any of the following? No changes in my recent health.   PAST MEDICAL HISTORY:  Have you personally ever had or currently have any of the following?  If \"YES,\" then please provide more detail. No changes in my medical history.   HISTORY OF IMMUNOSUPPRESSION: Do you have a history of any of the following:  Systemic Immunosuppression such as Diabetes, Biologic or Immunotherapy, Chemotherapy, Organ Transplantation, Bone Marrow Transplantation?  No     Answering \"YES\" requires the addition of the dotphrase \"IMMUNOSUPPRESSED\" as the first diagnosis of the patient's visit.   FAMILY HISTORY:  Any \"first degree relatives\" (parent, brother, sister, or child) with the following?    No changes in my family's known health.   PATIENT EXPERIENCE:    Do you want the Dermatologist to perform a COMPLETE skin exam today including a clinical examination under the \"bra and underwear\" areas?  Yes  If necessary, do we have your permission to call and leave a detailed message on your Preferred Phone number that includes your specific medical information?  Yes      Allergies   Allergen Reactions    Sulfa Antibiotics Other (See Comments)     Pt does not know what happens when she takes was a child when had the allergy    Metformin GI Intolerance      Current Outpatient Medications:     albuterol (Ventolin HFA) 90 mcg/act inhaler, Inhale 2 puffs every 4 (four) hours as needed for wheezing or shortness of breath, Disp: 18 g, Rfl: 0    clindamycin (CLEOCIN T) 1 % lotion, Apply topically 2 (two) times a day, Disp: 60 mL, " Rfl: 3    metFORMIN (GLUCOPHAGE-XR) 500 mg 24 hr tablet, Take 1 tablet (500 mg total) by mouth daily with dinner, Disp: 30 tablet, Rfl: 5    spironolactone (ALDACTONE) 50 mg tablet, Take 1 pill (50 mg) once daily for two weeks. Take with large glass of water. If well-tolerated, increase to 2 pills (100 mg) daily. Avoid large amounts of high potassium foods while on this medication., Disp: 180 tablet, Rfl: 3    ondansetron (ZOFRAN-ODT) 8 mg disintegrating tablet, Take 1 tablet (8 mg total) by mouth every 8 (eight) hours as needed for nausea for up to 3 days, Disp: 10 tablet, Rfl: 0          Whom besides the patient is providing clinical information about today's encounter?   NO ADDITIONAL HISTORIAN (patient alone provided history)    Physical Exam and Assessment/Plan by Diagnosis:    HISTORY OF MELANOMA    Physical Exam:  Anatomic Location Affected:  left upper arm  Morphological Description of Scar:  well healed  Year Treated: 2024  TNM Classification: pT2a (1.2 mm Breslow)  Suspected Recurrence: no  Regional adenopathy: no    The patient denies fevers, chills, night sweats, weight loss, headache, visual change, bone pain, paraesthesias, cough, SOB, abdominal pain, n/v/d, and is feeling at their baseline health.     Malignant melanoma of left upper extremity including shoulder (HCC)  Staging form: Melanoma of the Skin, AJCC 8th Edition  - Clinical stage from 9/28/2023: Stage IB (cT2a, cN0, cM0)   - Pathologic: Stage IB (pT2a, pN0, cM0)     5/18/2023 Biopsy     Left upper arm, shave biopsy   Thickness 1.2mm  1 Mitoses   No ulceration           1/12/2024 Surgery     B. Lymph node, left axillary sentinel lymph node #1:     -Two lymph nodes; one lymph node with rare solitary cells (<0.1 mm in diameter) suspicious (though not definitive) for metastatic melanoma cells (1/2 lymph nodes) (see comment).     -Incidental capsular/trabecular nevus.        C. Skin, left upper arm, excision:     -Residual focal atypical dermal  "melanocytic proliferation, consistent with residual melanoma (thickness: 0.7 mm); not seen at examined inked specimen margins.     -Prior procedure site changes present.         Additional History of Present Condition:  Patient had an excision on 1/12/24 by Dr. Garcia in surgical oncology. They removed one lymph node. She is following Dr. Clarke every 6 months. She was last seen by Dr Clarke 1/24/24 and Dr Garcia 7/18/24. She has also had atypical nevi excised on her right upper arm and back in Dec 2023.    Assessment and Plan:  Based on a thorough discussion of this condition and the management approach to it (including a comprehensive discussion of the known risks, side effects and potential benefits of treatment), the patient (family) agrees to implement the following specific plan:  Skin checks every 3-6 months  No LAD, neg ROS, and well healing scar on exam  Continue routine skin, eye, dental, OB/GYN exams  Sun protection with SPF 50 or higher, with sun protective clothing    What happens at follow-up?  The main purpose of follow-up is to detect recurrences early (metastatic melanoma), but it also offers an opportunity to diagnose a new primary melanoma at the first possible opportunity. A second invasive melanoma occurs in 5-10% of melanoma patients and a new melanoma in situ is diagnosed in more than 20% of melanoma patients.    Our practice makes the following recommendations for follow-up for patients with invasive melanoma.  At-least \"monthly\" self-skin examinations   Routine skin checks by a board certified dermatologist  Follow-up intervals are \"every 3 months\" within 2 years of a new melanoma diagnosis; \"every 6 months\" between 2-4 years of a new melanoma diagnosis; and \"annually\" after 4 years of a new melanoma diagnosis  Individual patient's needs should be considered before an appropriate follow-up is offered  Provide education and support to help the patient adjust to their illness    Follow-up " appointments should include:  A check of the scar where the primary melanoma was removed  Checking the regional lymph nodes  A general skin examination  A full physical examination at least annually by your primary care physician    In those with more advanced primary disease, follow-up may include:  Blood tests  Imaging: ultrasound, X-ray, CT, MRI and PET scan.    Most tests are not worthwhile for patients with stage 1 or 2 melanoma unless there are signs or symptoms of disease recurrence or metastasis. No tests are necessary for healthy patients who have remained well for five years or longer after removal of their melanoma.    What is the outlook for patients with melanoma?  Melanoma in situ is cured by excision because it has no potential to spread around the body.  The risk of spread and ultimate death from invasive melanoma depends on several factors, but the main one is the Breslow thickness of the melanoma at the time it was surgically removed.  Metastases are rare for melanomas < 0.75 mm and the risk for tumours 0.75-1 mm thick is about 5%. The risk steadily increases with thickness so that melanomas > 4 mm have a risk of metastasis of about 40%.    Melanoma is a potentially serious type of skin cancer, in which there is uncontrolled growth of melanocytes (pigment cells). Melanoma is sometimes called malignant melanoma.  Normal melanocytes are found in the basal layer of the epidermis (the outer layer of skin). Melanocytes produce a protein called melanin, which protects skin cells by absorbing ultraviolet (UV) radiation. Melanocytes are found in equal numbers in black and white skin, but melanocytes in black skin produce much more melanin. People with dark brown or black skin are very much less likely to be damaged by UV radiation than those with white skin.      SEBORRHEIC KERATOSES  - Relevant exam: Scattered over the trunk/extremities are waxy brown to black plaques and papules with stuck on appearance  and consistent dermoscopy  - Exam and clinical history consistent with seborrheic keratoses  - Counseled that these are benign growths that do not require treatment    MELANOCYTIC NEVI  -Relevant exam: Scattered over the trunk/extremities are homogenously pigmented brown macules and papules. ELM performed and without concerning findings. No outliers unless otherwise noted in today's note  - Exam and clinical history consistent with melanocytic nevi  - Counseled to return to clinic prior to scheduled appointment should any of these lesions change or should any new lesions of concern arise  - Counseled on use of sun protection daily. Reviewed latest FDA sunscreen guidelines, including use of broad spectrum (UVA and UVB blocking) sunscreen or sun protective clothing with SPF 30-50 every 2-3 hours and reapplied after exposure to water    LENTIGINES  OTHER SKIN CHANGES DUE TO CHRONIC EXPOSURE TO NONIONIZING RADIATION  - Relevant exam: Over sun exposed areas are brown macules. ELM performed and without concerning findings.  - Exam and clinical history consistent with lentigines.  - Counseled to return to clinic prior to scheduled appointment should any of these lesions change or should any new lesions of concern arise.  - Recommended use of sunscreen as above and below.    CHERRY ANGIOMAS  - Relevant exam: Scattered over the trunk/extremities are red papules  - Exam and clinical history consistent with cherry angiomas  - Educated that these are benign    No lesions worrisome for skin cancer on FBSE.      Scribe Attestation      I,:  Leonie Suarez am acting as a scribe while in the presence of the attending physician.:       I,:  Taylor Hart MD personally performed the services described in this documentation    as scribed in my presence.:

## 2024-09-10 ENCOUNTER — OFFICE VISIT (OUTPATIENT)
Dept: FAMILY MEDICINE CLINIC | Facility: CLINIC | Age: 39
End: 2024-09-10
Payer: COMMERCIAL

## 2024-09-10 ENCOUNTER — TELEPHONE (OUTPATIENT)
Dept: BARIATRICS | Facility: CLINIC | Age: 39
End: 2024-09-10

## 2024-09-10 VITALS
BODY MASS INDEX: 44.89 KG/M2 | HEART RATE: 66 BPM | SYSTOLIC BLOOD PRESSURE: 114 MMHG | OXYGEN SATURATION: 99 % | HEIGHT: 67 IN | DIASTOLIC BLOOD PRESSURE: 80 MMHG | WEIGHT: 286 LBS

## 2024-09-10 DIAGNOSIS — E66.01 MORBID OBESITY WITH BMI OF 40.0-44.9, ADULT (HCC): Primary | ICD-10-CM

## 2024-09-10 DIAGNOSIS — R73.9 HYPERGLYCEMIA: ICD-10-CM

## 2024-09-10 PROCEDURE — 99214 OFFICE O/P EST MOD 30 MIN: CPT | Performed by: NURSE PRACTITIONER

## 2024-09-10 NOTE — PROGRESS NOTES
Ambulatory Visit  Name: Sandrita Herron      : 1985      MRN: [id number]  Encounter Provider: NIMISHA Guerrero  Encounter Date: 9/10/2024   Encounter department: [facility]    Assessment & Plan  Morbid obesity with BMI of 40.0-44.9, adult (HCC)  Patient will be trialed on Wegovy to help with weight loss.  I did speak with the patient about common side effects of the medication.  She was also advised to contact the office in 2 months if she has been tolerating the 0.5 mg dosage of the medication well and we can titrate the dosage upward.    Orders:    Semaglutide-Weight Management (WEGOVY) 0.25 MG/0.5ML; Inject 0.5 mL (0.25 mg total) under the skin once a week    Hyperglycemia  Patient was advised to continue to limit sugar and carbohydrates in her diet.  Patient does have repeat CMP and hemoglobin A1c levels ordered to be completed prior to her next office visit.            History of Present Illness     Morbid obesity: Patient reports that she has been attempting to eat healthier and exercise more and has been having difficulty losing weight.  The patient was noted to have lost 1 pound since her last office visit.  Patient is currently managed on metformin 500 mg daily to help with weight loss.  Patient is interested in trialing Wegovy to help with weight loss.    Hyperglycemia: Patient's most recent hemoglobin A1c was 6.3 putting her in the prediabetes range.  Patient denies polydipsia, polyphagia, or polyuria.  Patient is currently managed on metformin 500 mg daily.          Review of Systems   Constitutional:  Negative for chills and fever.   HENT:  Negative for ear pain and sore throat.    Eyes:  Negative for pain and visual disturbance.   Respiratory:  Negative for cough, chest tightness, shortness of breath and wheezing.    Cardiovascular:  Negative for chest pain, palpitations and leg swelling.   Gastrointestinal:  Negative for abdominal pain, constipation, diarrhea,  "nausea and vomiting.   Endocrine: Negative for cold intolerance and heat intolerance.   Genitourinary:  Negative for decreased urine volume, dysuria and hematuria.   Musculoskeletal:  Negative for arthralgias, back pain and myalgias.   Skin:  Negative for color change and rash.   Allergic/Immunologic: Negative for environmental allergies.   Neurological:  Negative for dizziness, seizures, syncope, weakness, light-headedness, numbness and headaches.   Hematological:  Negative for adenopathy.   Psychiatric/Behavioral:  Negative for confusion. The patient is not nervous/anxious.    All other systems reviewed and are negative.          Objective     /80 (BP Location: Right arm, Patient Position: Sitting, Cuff Size: Standard)   Pulse 66   Ht 5' 7\" (1.702 m)   Wt 130 kg (286 lb)   SpO2 99%   BMI 44.79 kg/m²     Physical Exam  Vitals and nursing note reviewed.   Constitutional:       General: She is not in acute distress.     Appearance: Normal appearance. She is well-developed. She is not ill-appearing.   HENT:      Head: Normocephalic.   Eyes:      Conjunctiva/sclera: Conjunctivae normal.   Cardiovascular:      Rate and Rhythm: Normal rate and regular rhythm.      Pulses: Normal pulses.           Carotid pulses are 2+ on the right side and 2+ on the left side.       Radial pulses are 2+ on the right side and 2+ on the left side.        Posterior tibial pulses are 2+ on the right side and 2+ on the left side.      Heart sounds: Normal heart sounds. No murmur heard.  Pulmonary:      Effort: Pulmonary effort is normal. No respiratory distress.      Breath sounds: Normal breath sounds. No decreased breath sounds, wheezing, rhonchi or rales.   Abdominal:      General: Abdomen is flat. Bowel sounds are normal. There is no distension.      Palpations: Abdomen is soft.      Tenderness: There is no abdominal tenderness. There is no guarding.   Musculoskeletal:         General: No swelling. Normal range of motion.      " Cervical back: Normal range of motion and neck supple.      Right lower leg: No edema.      Left lower leg: No edema.   Skin:     General: Skin is warm and dry.      Capillary Refill: Capillary refill takes less than 2 seconds.   Neurological:      General: No focal deficit present.      Mental Status: She is alert and oriented to person, place, and time.   Psychiatric:         Mood and Affect: Mood normal.         Behavior: Behavior normal.         Thought Content: Thought content normal.         Judgment: Judgment normal.

## 2024-09-10 NOTE — ASSESSMENT & PLAN NOTE
Patient will be trialed on Wegovy to help with weight loss.  I did speak with the patient about common side effects of the medication.  She was also advised to contact the office in 2 months if she has been tolerating the 0.5 mg dosage of the medication well and we can titrate the dosage upward.    Orders:    Semaglutide-Weight Management (WEGOVY) 0.25 MG/0.5ML; Inject 0.5 mL (0.25 mg total) under the skin once a week

## 2024-09-10 NOTE — ASSESSMENT & PLAN NOTE
Patient was advised to continue to limit sugar and carbohydrates in her diet.  Patient does have repeat CMP and hemoglobin A1c levels ordered to be completed prior to her next office visit.

## 2024-09-11 DIAGNOSIS — L73.2 HIDRADENITIS SUPPURATIVA: ICD-10-CM

## 2024-09-11 DIAGNOSIS — R73.9 HYPERGLYCEMIA: ICD-10-CM

## 2024-09-11 RX ORDER — METFORMIN HCL 500 MG
500 TABLET, EXTENDED RELEASE 24 HR ORAL
Qty: 30 TABLET | Refills: 0 | Status: SHIPPED | OUTPATIENT
Start: 2024-09-11

## 2024-09-16 ENCOUNTER — TELEPHONE (OUTPATIENT)
Age: 39
End: 2024-09-16

## 2024-09-16 NOTE — TELEPHONE ENCOUNTER
PA for WEGOVY 0.25MG/0.5ML SUBMITTED     via    []CMM-KEY:   [x]Kim-Case ID # [id number]     []Faxed to plan   []Other website   []Phone call Case ID #     Office notes sent, clinical questions answered. Awaiting determination    Turnaround time for your insurance to make a decision on your Prior Authorization can take 7-21 business days.

## 2024-09-19 NOTE — TELEPHONE ENCOUNTER
PA for WEGOVY 0.25MG/0.5ML APPROVED     Date(s) approved: September 16, 2024 to September 16, 2025     Case #[id number]        Patient advised by          []MyChart Message  []Phone call   []LMOM  []L/M to call office as no active Communication consent on file  []Unable to leave detailed message as VM not approved on Communication consent       Pharmacy advised by    [x]Fax  []Phone call    Approval letter scanned into Media No waiting for letter

## 2024-10-05 ENCOUNTER — PATIENT MESSAGE (OUTPATIENT)
Dept: FAMILY MEDICINE CLINIC | Facility: CLINIC | Age: 39
End: 2024-10-05

## 2024-10-05 DIAGNOSIS — E66.01 MORBID OBESITY WITH BMI OF 40.0-44.9, ADULT (HCC): Primary | ICD-10-CM

## 2024-10-07 DIAGNOSIS — E66.01 MORBID OBESITY WITH BMI OF 40.0-44.9, ADULT (HCC): Primary | ICD-10-CM

## 2024-10-07 RX ORDER — SEMAGLUTIDE 0.5 MG/.5ML
INJECTION, SOLUTION SUBCUTANEOUS
Qty: 2 ML | Refills: 0 | Status: SHIPPED | OUTPATIENT
Start: 2024-10-07

## 2024-11-02 DIAGNOSIS — R73.9 HYPERGLYCEMIA: ICD-10-CM

## 2024-11-02 DIAGNOSIS — L73.2 HIDRADENITIS SUPPURATIVA: ICD-10-CM

## 2024-11-04 RX ORDER — METFORMIN HYDROCHLORIDE 500 MG/1
500 TABLET, EXTENDED RELEASE ORAL
Qty: 30 TABLET | Refills: 0 | Status: SHIPPED | OUTPATIENT
Start: 2024-11-04

## 2024-11-04 NOTE — TELEPHONE ENCOUNTER
Patient did not tolerate plain metformin.  Please make sure she is tolerating metformin  daily and I did send in refill

## 2024-11-04 NOTE — TELEPHONE ENCOUNTER
Requested Prescriptions     Pending Prescriptions Disp Refills    metFORMIN (GLUCOPHAGE-XR) 500 mg 24 hr tablet 30 tablet 0     Sig: Take 1 tablet (500 mg total) by mouth daily with dinner      LOV  9/10/24 F/U 12/3/24 Labs Active

## 2024-11-05 ENCOUNTER — OFFICE VISIT (OUTPATIENT)
Dept: DERMATOLOGY | Facility: CLINIC | Age: 39
End: 2024-11-05
Payer: COMMERCIAL

## 2024-11-05 VITALS — WEIGHT: 269 LBS | TEMPERATURE: 98 F | BODY MASS INDEX: 42.22 KG/M2 | HEIGHT: 67 IN

## 2024-11-05 DIAGNOSIS — L91.8 SKIN TAG: ICD-10-CM

## 2024-11-05 DIAGNOSIS — L64.9 ANDROGENIC ALOPECIA: ICD-10-CM

## 2024-11-05 DIAGNOSIS — D22.9 MULTIPLE NEVI: ICD-10-CM

## 2024-11-05 DIAGNOSIS — L81.4 LENTIGO: ICD-10-CM

## 2024-11-05 DIAGNOSIS — D18.01 CHERRY ANGIOMA: ICD-10-CM

## 2024-11-05 DIAGNOSIS — Z85.820 PERSONAL HISTORY OF MALIGNANT MELANOMA: Primary | ICD-10-CM

## 2024-11-05 PROCEDURE — 99214 OFFICE O/P EST MOD 30 MIN: CPT | Performed by: DERMATOLOGY

## 2024-11-05 RX ORDER — MINOXIDIL 2.5 MG/1
1.25 TABLET ORAL
Qty: 7 TABLET | Refills: 2 | Status: SHIPPED | OUTPATIENT
Start: 2024-11-05 | End: 2024-12-17

## 2024-11-05 NOTE — PROGRESS NOTES
"[facility] Note     Patient Name: Sandrita Herron  Encounter Date: 11/5/24     Have you been cared for by a [facility] Dermatologist in the last 3 years and, if so, which description applies to you?    Yes.  I have been here within the last 3 years, and my medical history has NOT changed since that time.  I am FEMALE/of child-bearing potential.    REVIEW OF SYSTEMS:  Have you recently had or currently have any of the following? No changes in my recent health.   PAST MEDICAL HISTORY:  Have you personally ever had or currently have any of the following?  If \"YES,\" then please provide more detail. No changes in my medical history.   HISTORY OF IMMUNOSUPPRESSION: Do you have a history of any of the following:  Systemic Immunosuppression such as Diabetes, Biologic or Immunotherapy, Chemotherapy, Organ Transplantation, Bone Marrow Transplantation or Prednisone?  No     Answering \"YES\" requires the addition of the dotphrase \"IMMUNOSUPPRESSED\" as the first diagnosis of the patient's visit.   FAMILY HISTORY:  Any \"first degree relatives\" (parent, brother, sister, or child) with the following?    No changes in my family's known health.   PATIENT EXPERIENCE:    Do you want the Dermatologist to perform a COMPLETE skin exam today including a clinical examination under the \"bra and underwear\" areas?  Yes  If necessary, do we have your permission to call and leave a detailed message on your Preferred Phone number that includes your specific medical information?  Yes      Allergies   Allergen Reactions    Sulfa Antibiotics Other (See Comments)     Pt does not know what happens when she takes was a child when had the allergy    Metformin GI Intolerance      Current Outpatient Medications:     metFORMIN (GLUCOPHAGE-XR) 500 mg 24 hr tablet, Take 1 tablet (500 mg total) by mouth daily with dinner, Disp: 30 tablet, Rfl: 0    albuterol (Ventolin HFA) 90 mcg/act inhaler, Inhale 2 puffs every 4 (four) hours as needed " for wheezing or shortness of breath, Disp: 18 g, Rfl: 0    clindamycin (CLEOCIN T) 1 % lotion, Apply topically 2 (two) times a day, Disp: 60 mL, Rfl: 3    ondansetron (ZOFRAN-ODT) 8 mg disintegrating tablet, Take 1 tablet (8 mg total) by mouth every 8 (eight) hours as needed for nausea for up to 3 days, Disp: 10 tablet, Rfl: 0    Semaglutide-Weight Management (Wegovy) 0.5 MG/0.5ML, Inject 0.5 mg under the skin weekly, Disp: 2 mL, Rfl: 0    spironolactone (ALDACTONE) 50 mg tablet, Take 1 pill (50 mg) once daily for two weeks. Take with large glass of water. If well-tolerated, increase to 2 pills (100 mg) daily. Avoid large amounts of high potassium foods while on this medication., Disp: 180 tablet, Rfl: 3          Whom besides the patient is providing clinical information about today's encounter?   NO ADDITIONAL HISTORIAN (patient alone provided history)    Physical Exam and Assessment/Plan by Diagnosis:  HISTORY OF MELANOMA     Physical Exam:  Anatomic Location Affected:  left upper arm  Morphological Description of Scar:  well healed  Year Treated: 2024  TNM Classification: pT2a (1.2 mm Breslow)  Suspected Recurrence: no  Regional adenopathy: no     The patient denies fevers, chills, night sweats, weight loss, headache, visual change, bone pain, paraesthesias, cough, SOB, abdominal pain, n/v/d, and is feeling at their baseline health.      Malignant melanoma of left upper extremity including shoulder (HCC)  Staging form: Melanoma of the Skin, AJCC 8th Edition  - Clinical stage from 9/28/2023: Stage IB (cT2a, cN0, cM0)   - Pathologic: Stage IB (pT2a, pN0, cM0)      5/18/2023 Biopsy     Left upper arm, shave biopsy   Thickness 1.2mm  1 Mitoses   No ulceration             1/12/2024 Surgery     B. Lymph node, left axillary sentinel lymph node #1:     -Two lymph nodes; one lymph node with rare solitary cells (<0.1 mm in diameter) suspicious (though not definitive) for metastatic melanoma cells (1/2 lymph nodes) (see  comment).     -Incidental capsular/trabecular nevus.        C. Skin, left upper arm, excision:     -Residual focal atypical dermal melanocytic proliferation, consistent with residual melanoma (thickness: 0.7 mm); not seen at examined inked specimen margins.     -Prior procedure site changes present.            Additional History of Present Condition:  Patient had an excision on 1/12/24 by Dr. Garcia in surgical oncology. They removed one lymph node. She is following Dr. Clarke every 6 months. She was last seen by Dr Clarke 1/24/24 and Dr Garcia 7/18/24. She has also had atypical nevi excised on her right upper arm and back in Dec 2023. She denies fevers, chills, night sweats, weight loss, headache, visual change, bone pain, paraesthesias, cough, SOB, abdominal pain, n/v/d, and is feeling at their baseline health. Appears she missed last appointment with Dr Clarke and does not have one scheduled.     Assessment and Plan:  Based on a thorough discussion of this condition and the management approach to it (including a comprehensive discussion of the known risks, side effects and potential benefits of treatment), the patient (family) agrees to implement the following specific plan:  Skin checks every 3-6 months  No LAD, neg ROS, and well healing scar on exam  Continue routine skin, eye, dental, OB/GYN exams  Sun protection with SPF 50 or higher, with sun protective clothing  Has follow up scheduled with Dr. Garcia on 1/14/25  Will message Dr Clarke's team for f/u     What happens at follow-up?  The main purpose of follow-up is to detect recurrences early (metastatic melanoma), but it also offers an opportunity to diagnose a new primary melanoma at the first possible opportunity. A second invasive melanoma occurs in 5-10% of melanoma patients and a new melanoma in situ is diagnosed in more than 20% of melanoma patients.     Our practice makes the following recommendations for follow-up for patients with invasive  "melanoma.  At-least \"monthly\" self-skin examinations   Routine skin checks by a board certified dermatologist  Follow-up intervals are \"every 3 months\" within 2 years of a new melanoma diagnosis; \"every 6 months\" between 2-4 years of a new melanoma diagnosis; and \"annually\" after 4 years of a new melanoma diagnosis  Individual patient's needs should be considered before an appropriate follow-up is offered  Provide education and support to help the patient adjust to their illness     Follow-up appointments should include:  A check of the scar where the primary melanoma was removed  Checking the regional lymph nodes  A general skin examination  A full physical examination at least annually by your primary care physician     In those with more advanced primary disease, follow-up may include:  Blood tests  Imaging: ultrasound, X-ray, CT, MRI and PET scan.     Most tests are not worthwhile for patients with stage 1 or 2 melanoma unless there are signs or symptoms of disease recurrence or metastasis. No tests are necessary for healthy patients who have remained well for five years or longer after removal of their melanoma.     What is the outlook for patients with melanoma?  Melanoma in situ is cured by excision because it has no potential to spread around the body.  The risk of spread and ultimate death from invasive melanoma depends on several factors, but the main one is the Breslow thickness of the melanoma at the time it was surgically removed.  Metastases are rare for melanomas < 0.75 mm and the risk for tumours 0.75-1 mm thick is about 5%. The risk steadily increases with thickness so that melanomas > 4 mm have a risk of metastasis of about 40%.     Melanoma is a potentially serious type of skin cancer, in which there is uncontrolled growth of melanocytes (pigment cells). Melanoma is sometimes called malignant melanoma.  Normal melanocytes are found in the basal layer of the epidermis (the outer layer of skin). " "Melanocytes produce a protein called melanin, which protects skin cells by absorbing ultraviolet (UV) radiation. Melanocytes are found in equal numbers in black and white skin, but melanocytes in black skin produce much more melanin. People with dark brown or black skin are very much less likely to be damaged by UV radiation than those with white skin.    ACROCHORDON (\"SKIN TAG\")    Physical Exam:  Anatomic Location Affected:  trunk and extremities  Morphological Description:    Pertinent Positives:  Pertinent Negatives:    Additional History of Present Condition:  present on exam    Assessment and Plan:  Based on a thorough discussion of this condition and the management approach to it (including a comprehensive discussion of the known risks, side effects and potential benefits of treatment), the patient (family) agrees to implement the following specific plan:  Reassured benign     Skin tags are common, soft, harmless skin lesions that are also called, in the appropriate settings, papillomas, fibroepithelial polyps, and soft fibromas.  They are made up of loosely arranged collagen fibers and blood vessels surrounded by a thickened or thinned-out epidermis.    Skin tags tend to develop in both men and women as we grow older.  They are usually found on the skin folds (neck, armpits, groin).  It is not known what specifically causes skin tags.  Certain factors, though, do appear to play a role:  Chaffing and irritation from skin rubbing together  High levels of growth factors (as seen, for example, in pregnancy or in acromegaly/gigantism)  Insulin resistance  Human papillomavirus (wart virus)    We discussed that most skin tags do not need to be treated unless they are specifically causing the patient physical distress or limitation or pose a risk for a larger problem such as an infection that forms secondary to excoriation or chronic irritation.    We had a thorough discussion of treatment options and specific risks " (including that any procedural treatment may not be covered by insurance and would then be the patient's responsibility) and benefits/alternatives including but not limited to the following:  Cryotherapy (freezing)  Shave removal  Surgical excision (snip excision with scissors)  Electrosurgery  Ligation (we do not do this procedure and counseled against it due to risk of tissue necrosis and infection)       ANDROGENETIC ALOPECIA     Physical Exam:  Anatomic Location Affected:  scalp  Morphological Description:  hair miniaturization primarily affecting the frontal scalp and extending to vertex with widening of the frontal part. No scale, scarring or inflammation. Negative hair pull test  Pertinent Positives: sister with significant hair loss and recession. She has irregular periods and hirsutism.   Pertinent Negatives:     Additional History of Present Condition:  She has not been diagnosed with PCOS but notes irregular periods and unwanted facial hair growth. Sister with hair loss as above. She just recently started noting thinning 6 weeks ago on frontal part. Denies shedding. No treatment thus far. No scalp symptoms. Denies history of cardiac disease. She is on spironolactone 100 mg daily and metformin 500 mg daily.     Assessment and Plan: Findings most c/w androgenetic alopecia early in evolution.  Based on a thorough discussion of this condition and the management approach to it (including a comprehensive discussion of the known risks, side effects and potential benefits of treatment), the patient (family) agrees to implement the following specific plan:  Discussed diagnosis and options for therapy  Alopecia labs: CBC, TSH, T4, iron panel, ferritin, zinc, vit D  Discussed topical and oral minoxidil, topical and oral 5 alpha reductase inhibitors, spironolactone, PRP, low level light etc  She prefers to continue spironolactone, metformin, and add oral minoxidil with a 6 month recheck  Start Minoxidil 1.25mg by  mouth daily. This is half a 2.5mg tablet. Please purchase a tablet cutter at the pharmacy when you  your medication. Oral minoxidil can have some side effects including:   Dizziness. We recommend starting this at night or bedtime for the first week for this reason to see how it affects you. Typically this will be seen early within the first month if it is going to occur.  Other side effects may not appear until after the first month, or up to 3 months after starting the medication. These include  Hair thickening on the sideburn/forehead area where you have tiny thin hairs. This is reversible with stopping the minoxidil.  Rarely,swelling of the feet, sometimes hands.   Very rarely, fluid around the heart (pericardial effusion). We usually do not worry about this unless you have a heart disease history but like to mention it regardless.     MELANOCYTIC NEVI  -Relevant exam: Scattered over the trunk/extremities are homogenously pigmented brown macules and papules. ELM performed and without concerning findings. No outliers unless otherwise noted in today's note  - Exam and clinical history consistent with melanocytic nevi  - Counseled to return to clinic prior to scheduled appointment should any of these lesions change or should any new lesions of concern arise  - Counseled on use of sun protection daily. Reviewed latest FDA sunscreen guidelines, including use of broad spectrum (UVA and UVB blocking) sunscreen or sun protective clothing with SPF 30-50 every 2-3 hours and reapplied after exposure to water    LENTIGINES  OTHER SKIN CHANGES DUE TO CHRONIC EXPOSURE TO NONIONIZING RADIATION  - Relevant exam: Over sun exposed areas are brown macules. ELM performed and without concerning findings.  - Exam and clinical history consistent with lentigines.  - Counseled to return to clinic prior to scheduled appointment should any of these lesions change or should any new lesions of concern arise.  - Recommended use of  sunscreen as above and below.    Joyce Sahu MD  Dermatology, PGY-2  Scribe Attestation      I,:  Rhea Bond MA am acting as a scribe while in the presence of the attending physician.:       I,:  Taylor Hart MD personally performed the services described in this documentation    as scribed in my presence.:

## 2024-11-06 ENCOUNTER — TELEPHONE (OUTPATIENT)
Dept: HEMATOLOGY ONCOLOGY | Facility: CLINIC | Age: 39
End: 2024-11-06

## 2024-11-06 NOTE — TELEPHONE ENCOUNTER
AN CLERICAL: received message from Dr. Clarke requesting this patient be scheduled for follow up appointment with her. Please assist.

## 2024-11-07 DIAGNOSIS — E66.01 MORBID OBESITY WITH BMI OF 40.0-44.9, ADULT (HCC): Primary | ICD-10-CM

## 2024-11-07 RX ORDER — SEMAGLUTIDE 1 MG/.5ML
INJECTION, SOLUTION SUBCUTANEOUS
Qty: 2 ML | Refills: 0 | Status: SHIPPED | OUTPATIENT
Start: 2024-11-07

## 2024-12-02 DIAGNOSIS — E66.01 MORBID OBESITY WITH BMI OF 40.0-44.9, ADULT (HCC): Primary | ICD-10-CM

## 2024-12-02 RX ORDER — SEMAGLUTIDE 1.7 MG/.75ML
INJECTION, SOLUTION SUBCUTANEOUS
Qty: 3 ML | Refills: 0 | Status: SHIPPED | OUTPATIENT
Start: 2024-12-02

## 2024-12-05 ENCOUNTER — TELEPHONE (OUTPATIENT)
Dept: HEMATOLOGY ONCOLOGY | Facility: CLINIC | Age: 39
End: 2024-12-05

## 2024-12-05 ENCOUNTER — APPOINTMENT (OUTPATIENT)
Dept: LAB | Facility: HOSPITAL | Age: 39
End: 2024-12-05
Payer: COMMERCIAL

## 2024-12-05 ENCOUNTER — RESULTS FOLLOW-UP (OUTPATIENT)
Dept: FAMILY MEDICINE CLINIC | Facility: CLINIC | Age: 39
End: 2024-12-05

## 2024-12-05 DIAGNOSIS — Z85.820 ENCOUNTER FOR FOLLOW-UP SURVEILLANCE OF MELANOMA: ICD-10-CM

## 2024-12-05 DIAGNOSIS — Z08 ENCOUNTER FOR FOLLOW-UP SURVEILLANCE OF MELANOMA: ICD-10-CM

## 2024-12-05 DIAGNOSIS — T50.905A ADVERSE EFFECT OF DRUG, INITIAL ENCOUNTER: ICD-10-CM

## 2024-12-05 DIAGNOSIS — E78.5 DYSLIPIDEMIA: ICD-10-CM

## 2024-12-05 DIAGNOSIS — G43.909 MIGRAINE WITHOUT STATUS MIGRAINOSUS, NOT INTRACTABLE, UNSPECIFIED MIGRAINE TYPE: ICD-10-CM

## 2024-12-05 DIAGNOSIS — L64.9 ANDROGENIC ALOPECIA: ICD-10-CM

## 2024-12-05 DIAGNOSIS — R73.9 HYPERGLYCEMIA: ICD-10-CM

## 2024-12-05 DIAGNOSIS — F33.8 SEASONAL AFFECTIVE DISORDER (HCC): ICD-10-CM

## 2024-12-05 DIAGNOSIS — E66.01 MORBID OBESITY WITH BMI OF 40.0-44.9, ADULT (HCC): ICD-10-CM

## 2024-12-05 DIAGNOSIS — L73.2 HIDRADENITIS SUPPURATIVA: ICD-10-CM

## 2024-12-05 DIAGNOSIS — C43.62 MALIGNANT MELANOMA OF LEFT UPPER EXTREMITY INCLUDING SHOULDER (HCC): ICD-10-CM

## 2024-12-05 DIAGNOSIS — J45.40 MODERATE PERSISTENT ASTHMA WITHOUT COMPLICATION: ICD-10-CM

## 2024-12-05 LAB
25(OH)D3 SERPL-MCNC: 18.9 NG/ML (ref 30–100)
ALBUMIN SERPL BCG-MCNC: 4.1 G/DL (ref 3.5–5)
ALP SERPL-CCNC: 62 U/L (ref 34–104)
ALT SERPL W P-5'-P-CCNC: 21 U/L (ref 7–52)
ANION GAP SERPL CALCULATED.3IONS-SCNC: 7 MMOL/L (ref 4–13)
AST SERPL W P-5'-P-CCNC: 15 U/L (ref 13–39)
BACTERIA UR QL AUTO: ABNORMAL /HPF
BASOPHILS # BLD AUTO: 0.03 THOUSANDS/ÂΜL (ref 0–0.1)
BASOPHILS NFR BLD AUTO: 1 % (ref 0–1)
BILIRUB SERPL-MCNC: 0.9 MG/DL (ref 0.2–1)
BILIRUB UR QL STRIP: NEGATIVE
BUN SERPL-MCNC: 13 MG/DL (ref 5–25)
CALCIUM SERPL-MCNC: 8.6 MG/DL (ref 8.4–10.2)
CHLORIDE SERPL-SCNC: 105 MMOL/L (ref 96–108)
CHOLEST SERPL-MCNC: 153 MG/DL (ref ?–200)
CLARITY UR: CLEAR
CO2 SERPL-SCNC: 25 MMOL/L (ref 21–32)
COLOR UR: YELLOW
CREAT SERPL-MCNC: 0.87 MG/DL (ref 0.6–1.3)
EOSINOPHIL # BLD AUTO: 0.25 THOUSAND/ÂΜL (ref 0–0.61)
EOSINOPHIL NFR BLD AUTO: 5 % (ref 0–6)
ERYTHROCYTE [DISTWIDTH] IN BLOOD BY AUTOMATED COUNT: 12.1 % (ref 11.6–15.1)
EST. AVERAGE GLUCOSE BLD GHB EST-MCNC: 117 MG/DL
FERRITIN SERPL-MCNC: 31 NG/ML (ref 11–307)
GFR SERPL CREATININE-BSD FRML MDRD: 84 ML/MIN/1.73SQ M
GLUCOSE SERPL-MCNC: 121 MG/DL (ref 65–140)
GLUCOSE UR STRIP-MCNC: NEGATIVE MG/DL
HBA1C MFR BLD: 5.7 %
HCT VFR BLD AUTO: 38.4 % (ref 34.8–46.1)
HDLC SERPL-MCNC: 29 MG/DL
HGB BLD-MCNC: 12.4 G/DL (ref 11.5–15.4)
HGB UR QL STRIP.AUTO: ABNORMAL
IMM GRANULOCYTES # BLD AUTO: 0.02 THOUSAND/UL (ref 0–0.2)
IMM GRANULOCYTES NFR BLD AUTO: 0 % (ref 0–2)
KETONES UR STRIP-MCNC: NEGATIVE MG/DL
LDH SERPL-CCNC: 160 U/L (ref 140–271)
LDLC SERPL CALC-MCNC: 88 MG/DL (ref 0–100)
LEUKOCYTE ESTERASE UR QL STRIP: NEGATIVE
LYMPHOCYTES # BLD AUTO: 1.08 THOUSANDS/ÂΜL (ref 0.6–4.47)
LYMPHOCYTES NFR BLD AUTO: 20 % (ref 14–44)
MCH RBC QN AUTO: 28.6 PG (ref 26.8–34.3)
MCHC RBC AUTO-ENTMCNC: 32.3 G/DL (ref 31.4–37.4)
MCV RBC AUTO: 89 FL (ref 82–98)
MONOCYTES # BLD AUTO: 0.29 THOUSAND/ÂΜL (ref 0.17–1.22)
MONOCYTES NFR BLD AUTO: 5 % (ref 4–12)
MUCOUS THREADS UR QL AUTO: ABNORMAL
NEUTROPHILS # BLD AUTO: 3.82 THOUSANDS/ÂΜL (ref 1.85–7.62)
NEUTS SEG NFR BLD AUTO: 69 % (ref 43–75)
NITRITE UR QL STRIP: NEGATIVE
NON-SQ EPI CELLS URNS QL MICRO: ABNORMAL /HPF
NRBC BLD AUTO-RTO: 0 /100 WBCS
PH UR STRIP.AUTO: 5 [PH]
PLATELET # BLD AUTO: 272 THOUSANDS/UL (ref 149–390)
PMV BLD AUTO: 10 FL (ref 8.9–12.7)
POTASSIUM SERPL-SCNC: 3.8 MMOL/L (ref 3.5–5.3)
PROT SERPL-MCNC: 7 G/DL (ref 6.4–8.4)
PROT UR STRIP-MCNC: ABNORMAL MG/DL
RBC # BLD AUTO: 4.33 MILLION/UL (ref 3.81–5.12)
RBC #/AREA URNS AUTO: ABNORMAL /HPF
SODIUM SERPL-SCNC: 137 MMOL/L (ref 135–147)
SP GR UR STRIP.AUTO: 1.03 (ref 1–1.03)
T4 FREE SERPL-MCNC: 0.83 NG/DL (ref 0.61–1.12)
TRIGL SERPL-MCNC: 179 MG/DL (ref ?–150)
TSH SERPL DL<=0.05 MIU/L-ACNC: 1.53 UIU/ML (ref 0.45–4.5)
UROBILINOGEN UR STRIP-ACNC: <2 MG/DL
WBC # BLD AUTO: 5.49 THOUSAND/UL (ref 4.31–10.16)
WBC #/AREA URNS AUTO: ABNORMAL /HPF

## 2024-12-05 PROCEDURE — 84439 ASSAY OF FREE THYROXINE: CPT

## 2024-12-05 PROCEDURE — 80061 LIPID PANEL: CPT

## 2024-12-05 PROCEDURE — 83615 LACTATE (LD) (LDH) ENZYME: CPT

## 2024-12-05 PROCEDURE — 85025 COMPLETE CBC W/AUTO DIFF WBC: CPT

## 2024-12-05 PROCEDURE — 82306 VITAMIN D 25 HYDROXY: CPT

## 2024-12-05 PROCEDURE — 81001 URINALYSIS AUTO W/SCOPE: CPT

## 2024-12-05 PROCEDURE — 83036 HEMOGLOBIN GLYCOSYLATED A1C: CPT

## 2024-12-05 PROCEDURE — 82728 ASSAY OF FERRITIN: CPT

## 2024-12-05 PROCEDURE — 83540 ASSAY OF IRON: CPT

## 2024-12-05 PROCEDURE — 36415 COLL VENOUS BLD VENIPUNCTURE: CPT

## 2024-12-05 PROCEDURE — 83550 IRON BINDING TEST: CPT

## 2024-12-05 PROCEDURE — 84443 ASSAY THYROID STIM HORMONE: CPT

## 2024-12-05 PROCEDURE — 80053 COMPREHEN METABOLIC PANEL: CPT

## 2024-12-05 PROCEDURE — 84630 ASSAY OF ZINC: CPT

## 2024-12-06 LAB
IRON SATN MFR SERPL: 26 % (ref 15–50)
IRON SERPL-MCNC: 91 UG/DL (ref 50–212)
TIBC SERPL-MCNC: 348 UG/DL (ref 250–450)
UIBC SERPL-MCNC: 257 UG/DL (ref 155–355)

## 2024-12-07 LAB — ZINC SERPL-MCNC: 78 UG/DL (ref 44–115)

## 2024-12-09 NOTE — RESULT ENCOUNTER NOTE
"Team- please let patient know labs showed slightly low vitamin D, and ferritin of only 31. This is the body's storage form of iron and needs to be 70 for normal hair growth. I would like her to discuss the vitamin D with her PCP and start a supplement called \"SlowFe\" to help get the ferritin up. This can be purchased onlineand follow the package instructions."

## 2024-12-10 ENCOUNTER — TELEPHONE (OUTPATIENT)
Dept: SURGICAL ONCOLOGY | Facility: CLINIC | Age: 39
End: 2024-12-10

## 2024-12-12 ENCOUNTER — OFFICE VISIT (OUTPATIENT)
Dept: HEMATOLOGY ONCOLOGY | Facility: CLINIC | Age: 39
End: 2024-12-12
Payer: COMMERCIAL

## 2024-12-12 VITALS
OXYGEN SATURATION: 92 % | HEIGHT: 67 IN | HEART RATE: 93 BPM | SYSTOLIC BLOOD PRESSURE: 122 MMHG | RESPIRATION RATE: 18 BRPM | DIASTOLIC BLOOD PRESSURE: 78 MMHG | BODY MASS INDEX: 42.06 KG/M2 | WEIGHT: 268 LBS | TEMPERATURE: 97.2 F

## 2024-12-12 DIAGNOSIS — Z08 ENCOUNTER FOR FOLLOW-UP SURVEILLANCE OF MELANOMA: Primary | ICD-10-CM

## 2024-12-12 DIAGNOSIS — Z85.820 ENCOUNTER FOR FOLLOW-UP SURVEILLANCE OF MELANOMA: Primary | ICD-10-CM

## 2024-12-12 DIAGNOSIS — C43.62 MALIGNANT MELANOMA OF LEFT UPPER EXTREMITY INCLUDING SHOULDER (HCC): ICD-10-CM

## 2024-12-12 PROCEDURE — 99213 OFFICE O/P EST LOW 20 MIN: CPT | Performed by: INTERNAL MEDICINE

## 2024-12-12 NOTE — PROGRESS NOTES
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)  -     CBC and differential; Future  -     Comprehensive metabolic panel; Future  -     LD,Blood; Future        Oncology History   Malignant melanoma of left upper extremity including shoulder (HCC)   5/18/2023 Biopsy    Left upper arm, shave biopsy   Thickness 1.2mm  1 Mitoses   No ulceration        9/28/2023 -  Cancer Staged    Staging form: Melanoma of the Skin, AJCC 8th Edition  - Clinical stage from 9/28/2023: Stage IB (cT2a, cN0, cM0) - Signed by Alaina Clarke MD on 12/7/2023 11/22/2023 Initial Diagnosis    Malignant melanoma of left upper extremity including shoulder (HCC)     1/12/2024 Surgery    B. Lymph node, left axillary sentinel lymph node #1:     -Two lymph nodes; one lymph node with rare solitary cells (<0.1 mm in diameter) suspicious (though not definitive) for metastatic melanoma cells (1/2 lymph nodes) (see comment).     -Incidental capsular/trabecular nevus.        C. Skin, left upper arm, excision:     -Residual focal atypical dermal melanocytic proliferation, consistent with residual melanoma (thickness: 0.7 mm); not seen at examined inked specimen margins.     -Prior procedure site changes present.     1/23/2024 -  Cancer Staged    Staging form: Melanoma of the Skin, AJCC 8th Edition  - Pathologic: Stage IB (pT2a, pN0, cM0) - Signed by Nisha Garcia MD on 1/23/2024          Cancer Staging   Malignant melanoma of left upper extremity including shoulder (HCC)  Staging form: Melanoma of the Skin, AJCC 8th Edition  - Clinical stage from 9/28/2023: Stage IB (cT2a, cN0, cM0) - Signed by  Alaina Clarke MD on 12/7/2023  - Pathologic: Stage IB (pT2a, pN0, cM0) - Signed by Nisha Garcia MD on 1/23/2024          HISTORY OF PRESENT ILLNESS: Sandrita Herron is a 39 y.o. female  who has stage Ib melanoma here for continued monitoring, follow-up and surveillance.    She is doing well.  No concerns or complaints.  Eating okay.  Energy is good.  Follows closely with dermatology and recently saw them within the past couple months.  Scar is healed well although she is not happy with the appearance.  She states that it is sensitive but there are areas around it that have no feeling.  Also noting healing of the scar underneath her left arm with some scar tissue.    Denies any new, changing, concerning skin lesions.  Denies any lymphadenopathy.        REVIEW OF SYSTEMS:  Review of Systems   Constitutional:  Negative for appetite change, fatigue, fever and unexpected weight change.   HENT:   Negative for lump/mass, trouble swallowing and voice change.    Eyes:  Negative for icterus.   Respiratory:  Negative for cough, shortness of breath and wheezing.    Cardiovascular:  Negative for leg swelling.   Gastrointestinal:  Negative for abdominal pain, constipation, diarrhea, nausea and vomiting.   Genitourinary:  Negative for difficulty urinating and hematuria.    Musculoskeletal:  Negative for arthralgias, gait problem and myalgias.   Skin:  Negative for itching and rash.        No new, changing, or concerning lesions.   Neurological:  Negative for extremity weakness, gait problem, headaches, light-headedness and numbness.   Hematological:  Negative for adenopathy.        MEDICATIONS:    Current Outpatient Medications:     albuterol (Ventolin HFA) 90 mcg/act inhaler, Inhale 2 puffs every 4 (four) hours as needed for wheezing or shortness of breath, Disp: 18 g, Rfl: 0    metFORMIN (GLUCOPHAGE-XR) 500 mg 24 hr tablet, Take 1 tablet (500 mg total) by mouth daily with dinner, Disp: 30 tablet, Rfl: 0    minoxidil  (LONITEN) 2.5 mg tablet, Take 0.5 tablets (1.25 mg total) by mouth daily at bedtime, Disp: 7 tablet, Rfl: 2    Semaglutide-Weight Management (Wegovy) 1.7 MG/0.75ML, Inject 1.7 mg under the skin weekly, Disp: 3 mL, Rfl: 0    spironolactone (ALDACTONE) 50 mg tablet, Take 1 pill (50 mg) once daily for two weeks. Take with large glass of water. If well-tolerated, increase to 2 pills (100 mg) daily. Avoid large amounts of high potassium foods while on this medication., Disp: 180 tablet, Rfl: 3    clindamycin (CLEOCIN T) 1 % lotion, Apply topically 2 (two) times a day (Patient not taking: Reported on 11/5/2024), Disp: 60 mL, Rfl: 3    ondansetron (ZOFRAN-ODT) 8 mg disintegrating tablet, Take 1 tablet (8 mg total) by mouth every 8 (eight) hours as needed for nausea for up to 3 days, Disp: 10 tablet, Rfl: 0     ALLERGIES:  Allergies   Allergen Reactions    Sulfa Antibiotics Other (See Comments)     Pt does not know what happens when she takes was a child when had the allergy    Metformin GI Intolerance        ACTIVE PROBLEMS:  Patient Active Problem List   Diagnosis    Allergic rhinitis    Moderate persistent asthma    Fatigue    Migraine without status migrainosus, not intractable    Hyperglycemia    Dyslipidemia    Morbid obesity with BMI of 40.0-44.9, adult (HCC)    Back skin lesion    Malignant melanoma of left upper extremity including shoulder (HCC)    Lipoma    Encounter for follow-up surveillance of melanoma    Hidradenitis suppurativa          PAST MEDICAL HISTORY:   Past Medical History:   Diagnosis Date    Asthma     had vaccine     Cancer (HCC)     melanoma    Diabetes mellitus (HCC)     gestational DM diet controlled    Infertility 02/03/2017    only got  sperm tested, naturally conceived     Lumbar strain 07/16/2015    Melanoma (HCC)     Punch Bx left upper extremity    Pneumonia     Varicella     chicken pox as child    Visual impairment     eyewear         PAST SURGICAL HISTORY:  Past Surgical  History:   Procedure Laterality Date    FOOT SURGERY      LIPOMA RESECTION Left 2024    Procedure: LEFT UPPER ARM LIPOMA EXCISION;  Surgeon: Nisha Garcia MD;  [facility];  Service: Surgical Oncology    LYMPH NODE BIOPSY Left 2024    Procedure: SENTINEL LYMPH NODE BIOPSY (INJECT IN NUC MED BY RADIOLOGIST AT 11:30AM);  Surgeon: Nisha Garcia MD;  [facility];  Service: Surgical Oncology    SKIN LESION EXCISION Left 2024    Procedure: LEFT UPPER ARM MELANOMA WIDE LOCAL EXCISION;  Surgeon: Nisha Garcia MD;  [facility];  Service: Surgical Oncology    US GUIDED BREAST BIOPSY LEFT COMPLETE Left 2023    WISDOM TOOTH EXTRACTION          SOCIAL HISTORY:  Social History     Socioeconomic History    Marital status: /Civil Union     Spouse name: Not on file    Number of children: Not on file    Years of education: Not on file    Highest education level: Not on file   Occupational History    Occupation: [employer]     Employer: [employer]   Tobacco Use    Smoking status: Former     Current packs/day: 0.00     Average packs/day: 0.5 packs/day for 12.5 years (6.3 ttl pk-yrs)     Types: Cigarettes     Start date:      Quit date: 2017     Years since quittin.4    Smokeless tobacco: Never   Vaping Use    Vaping status: Never Used   Substance and Sexual Activity    Alcohol use: Yes     Comment: barely    Drug use: No    Sexual activity: Yes     Partners: Male     Birth control/protection: None     Comment: withdrawl   Other Topics Concern    Not on file   Social History Narrative    Caffeine use     Social Drivers of Health     Financial Resource Strain: Not on file   Food Insecurity: Not on file   Transportation Needs: Not on file   Physical Activity: Not on file   Stress: Not on file   Social Connections: Not on file   Intimate Partner Violence: Not on file   Housing Stability: Not on file        FAMILY HISTORY:  Family History   Problem  "Relation Age of Onset    Multiple sclerosis Mother     Skin cancer Mother     Cancer Mother     Other Mother         Myocardial bridge    Diabetes Father     Other Father         Pericarditis    Miscarriages / Stillbirths Sister     Asthma Maternal Grandmother     Diabetes Paternal Grandmother     No Known Problems Son     Learning disabilities Other     No Known Problems Paternal Aunt     No Known Problems Maternal Aunt     Breast cancer Neg Hx     Colon cancer Neg Hx     Ovarian cancer Neg Hx     Uterine cancer Neg Hx     Cervical cancer Neg Hx            Objective    PHYSICAL EXAMINATION:   Blood pressure 122/78, pulse 93, temperature (!) 97.2 °F (36.2 °C), temperature source Temporal, resp. rate 18, height 5' 7\" (1.702 m), weight 122 kg (268 lb), SpO2 92%, not currently breastfeeding.     Pain Score: 0-No pain     ECOG Performance Status      Flowsheet Row Most Recent Value   ECOG Performance Status 0 - Fully active, able to carry on all pre-disease performance without restriction               Physical Exam  Constitutional:       General: She is not in acute distress.     Appearance: Normal appearance. She is not ill-appearing or toxic-appearing.   HENT:      Head: Normocephalic and atraumatic.      Right Ear: External ear normal.      Left Ear: External ear normal.      Nose: Nose normal.      Mouth/Throat:      Mouth: Mucous membranes are moist.      Pharynx: Oropharynx is clear.   Eyes:      General: No scleral icterus.        Right eye: No discharge.         Left eye: No discharge.      Conjunctiva/sclera: Conjunctivae normal.   Cardiovascular:      Rate and Rhythm: Normal rate and regular rhythm.      Pulses: Normal pulses.      Heart sounds: No murmur heard.     No friction rub. No gallop.   Pulmonary:      Effort: Pulmonary effort is normal. No respiratory distress.      Breath sounds: Normal breath sounds. No wheezing or rales.   Abdominal:      General: Bowel sounds are normal. There is no distension. "      Palpations: There is no mass.      Tenderness: There is no abdominal tenderness. There is no rebound.   Musculoskeletal:         General: No swelling or tenderness.      Cervical back: Normal range of motion. No rigidity.      Right lower leg: No edema.      Left lower leg: No edema.   Lymphadenopathy:      Head:      Right side of head: No submandibular, preauricular or posterior auricular adenopathy.      Left side of head: No submandibular, preauricular or posterior auricular adenopathy.      Cervical: No cervical adenopathy.      Right cervical: No superficial or posterior cervical adenopathy.     Left cervical: No superficial or posterior cervical adenopathy.      Upper Body:      Right upper body: No supraclavicular or axillary adenopathy.      Left upper body: No supraclavicular or axillary adenopathy.   Skin:     General: Skin is warm.      Coloration: Skin is not jaundiced.      Findings: No lesion or rash.      Comments: Well healed surgical scar.  No evidence of recurrence at primary site.   Neurological:      General: No focal deficit present.      Mental Status: She is alert and oriented to person, place, and time.      Cranial Nerves: No cranial nerve deficit.      Motor: No weakness.      Gait: Gait normal.   Psychiatric:         Mood and Affect: Mood normal.         Behavior: Behavior normal.         Thought Content: Thought content normal.         Judgment: Judgment normal.         I reviewed lab data in the chart.    WBC   Date Value Ref Range Status   12/05/2024 5.49 4.31 - 10.16 Thousand/uL Final   01/05/2024 6.51 4.31 - 10.16 Thousand/uL Final   08/29/2022 7.25 4.31 - 10.16 Thousand/uL Final   03/14/2014 7.09 4.31 - 10.16 Thousand/uL Final     Comment:     New Reference Range     Hemoglobin   Date Value Ref Range Status   12/05/2024 12.4 11.5 - 15.4 g/dL Final   01/05/2024 12.3 11.5 - 15.4 g/dL Final   08/29/2022 12.5 11.5 - 15.4 g/dL Final   03/14/2014 13.3 11.5 - 15.4 g/dL Final      Comment:     New Reference Range     Platelets   Date Value Ref Range Status   12/05/2024 272 149 - 390 Thousands/uL Final   01/05/2024 274 149 - 390 Thousands/uL Final   08/29/2022 295 149 - 390 Thousands/uL Final   03/14/2014 342 149 - 390 Thousand/uL Final     Comment:     New Reference Range     MCV   Date Value Ref Range Status   12/05/2024 89 82 - 98 fL Final   01/05/2024 88 82 - 98 fL Final   08/29/2022 90 82 - 98 fL Final   03/14/2014 88 82 - 98 fL Final     Comment:     New Reference Range      Sodium   Date Value Ref Range Status   03/14/2014 137 135 - 145 mmol/L Final     Potassium   Date Value Ref Range Status   12/05/2024 3.8 3.5 - 5.3 mmol/L Final   01/05/2024 4.0 3.5 - 5.3 mmol/L Final   02/08/2023 4.1 3.5 - 5.3 mmol/L Final   03/14/2014 4.3 3.6 - 5.0 mmol/L Final     Chloride   Date Value Ref Range Status   12/05/2024 105 96 - 108 mmol/L Final   01/05/2024 104 96 - 108 mmol/L Final   02/08/2023 104 96 - 108 mmol/L Final   03/14/2014 103 101 - 111 mmol/L Final     CO2   Date Value Ref Range Status   12/05/2024 25 21 - 32 mmol/L Final   01/05/2024 26 21 - 32 mmol/L Final   02/08/2023 26 21 - 32 mmol/L Final   03/14/2014 27 21 - 31 mmol/L Final     BUN   Date Value Ref Range Status   12/05/2024 13 5 - 25 mg/dL Final   01/05/2024 13 5 - 25 mg/dL Final   02/08/2023 11 5 - 25 mg/dL Final   03/14/2014 11 5 - 27 mg/dL Final     Creatinine   Date Value Ref Range Status   12/05/2024 0.87 0.60 - 1.30 mg/dL Final     Comment:     Standardized to IDMS reference method   01/05/2024 0.79 0.60 - 1.30 mg/dL Final     Comment:     Standardized to IDMS reference method   02/08/2023 0.85 0.60 - 1.30 mg/dL Final     Comment:     Standardized to IDMS reference method   03/14/2014 0.72 0.60 - 1.30 mg/dL Final     Comment:     Standardized to IDMS reference method     Glucose   Date Value Ref Range Status   12/05/2024 121 65 - 140 mg/dL Final     Comment:     If the patient is fasting, the ADA then defines impaired fasting  glucose as > 100 mg/dL and diabetes as > or equal to 123 mg/dL.   08/29/2022 98 65 - 140 mg/dL Final     Comment:     If the patient is fasting, the ADA then defines impaired fasting glucose as > 100 mg/dL and diabetes as > or equal to 123 mg/dL.  Specimen collection should occur prior to Sulfasalazine administration due to the potential for falsely depressed results. Specimen collection should occur prior to Sulfapyridine administration due to the potential for falsely elevated results.   06/04/2020 120 65 - 140 mg/dL Final     Comment:       If the patient is fasting, the ADA then defines impaired fasting glucose as > 100 mg/dL and diabetes as > or equal to 123 mg/dL.  Specimen collection should occur prior to Sulfasalazine administration due to the potential for falsely depressed results. Specimen collection should occur prior to Sulfapyridine administration due to the potential for falsely elevated results.     Calcium   Date Value Ref Range Status   12/05/2024 8.6 8.4 - 10.2 mg/dL Final   01/05/2024 9.0 8.4 - 10.2 mg/dL Final   02/08/2023 8.8 8.4 - 10.2 mg/dL Final   03/14/2014 8.9 8.3 - 10.1 mg/dL Final     Total Protein   Date Value Ref Range Status   03/14/2014 7.5 6.4 - 8.2 g/dL Final     Albumin   Date Value Ref Range Status   12/05/2024 4.1 3.5 - 5.0 g/dL Final   01/05/2024 4.1 3.5 - 5.0 g/dL Final   02/08/2023 4.1 3.5 - 5.0 g/dL Final   03/14/2014 3.9 3.5 - 5.0 g/dL Final     Total Bilirubin   Date Value Ref Range Status   12/05/2024 0.90 0.20 - 1.00 mg/dL Final     Comment:     Use of this assay is not recommended for patients undergoing treatment with eltrombopag due to the potential for falsely elevated results.  N-acetyl-p-benzoquinone imine (metabolite of Acetaminophen) will generate erroneously low results in samples for patients that have taken an overdose of Acetaminophen.   01/05/2024 0.57 0.20 - 1.00 mg/dL Final     Comment:     Use of this assay is not recommended for patients undergoing  "treatment with eltrombopag due to the potential for falsely elevated results.  N-acetyl-p-benzoquinone imine (metabolite of Acetaminophen) will generate erroneously low results in samples for patients that have taken an overdose of Acetaminophen.   02/08/2023 0.66 0.20 - 1.00 mg/dL Final     Alkaline Phosphatase   Date Value Ref Range Status   12/05/2024 62 34 - 104 U/L Final   01/05/2024 59 34 - 104 U/L Final   02/08/2023 64 34 - 104 U/L Final   03/14/2014 83 42 - 121 U/L Final     AST   Date Value Ref Range Status   12/05/2024 15 13 - 39 U/L Final   01/05/2024 13 13 - 39 U/L Final   02/08/2023 14 13 - 39 U/L Final     Comment:     Specimen collection should occur prior to Sulfasalazine administration due to the potential for falsely depressed results.    03/14/2014 19 10 - 42 U/L Final     ALT   Date Value Ref Range Status   12/05/2024 21 7 - 52 U/L Final     Comment:     Specimen collection should occur prior to Sulfasalazine administration due to the potential for falsely depressed results.    01/05/2024 21 7 - 52 U/L Final     Comment:     Specimen collection should occur prior to Sulfasalazine administration due to the potential for falsely depressed results.    02/08/2023 19 7 - 52 U/L Final     Comment:     Specimen collection should occur prior to Sulfasalazine administration due to the potential for falsely depressed results.    03/14/2014 30 6 - 78 U/L Final      LD   Date Value Ref Range Status   12/05/2024 160 140 - 271 U/L Final   01/05/2024 144 140 - 271 U/L Final     No results found for: \"TSH\"  No results found for: \"S6YJONU\"   Free T4   Date Value Ref Range Status   12/05/2024 0.83 0.61 - 1.12 ng/dL Final     Comment:     Specimens with biotin concentrations > 10 ng/mL can lead to significant (> 10%) positive bias in result.         RECENT IMAGING:  No results found.          Assessment/Plan  Ms. Herron is a 39-year-old female with stage Ib melanoma here for continued monitoring, follow-up and " surveillance.    1. Encounter for follow-up surveillance of melanoma (Primary)  2. Malignant melanoma of left upper extremity including shoulder (HCC)  She is doing well with no clinical evidence of melanoma recurrence.  Labs reviewed and okay.  She knows to continue to monitor for any new, changing, concerning skin lesions.  She knows to monitor for lymphadenopathy.  She will return in 6 months with blood work at that time.  Orders placed and prescription provided.  She knows to call with issues or concerns prior to her next visit.    - CBC and differential; Future  - Comprehensive metabolic panel; Future  - LD,Blood; Future        Return in about 6 months (around 6/12/2025) for labs.     Alaina Clarke MD, PhD

## 2024-12-12 NOTE — LETTER
December 12, 2024     Nisha Garcia MD  [address]  [facility] [address]    Patient: Sandrita Herron   YOB: 1985   Date of Visit: 12/12/2024       Dear Dr. Garcia:    Thank you for referring Sandrita Herron to me for evaluation. Below are my notes for this consultation.    If you have questions, please do not hesitate to call me. I look forward to following your patient along with you.         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)  -     CBC and differential; Future  -     Comprehensive metabolic panel; Future  -     LD,Blood; Future        Oncology History   Malignant melanoma of left upper extremity including shoulder (HCC)   5/18/2023 Biopsy    Left upper arm, shave biopsy   Thickness 1.2mm  1 Mitoses   No ulceration        9/28/2023 -  Cancer Staged    Staging form: Melanoma of the Skin, AJCC 8th Edition  - Clinical stage from 9/28/2023: Stage IB (cT2a, cN0, cM0) - Signed by Alaina Clarke MD on 12/7/2023 11/22/2023 Initial Diagnosis    Malignant melanoma of left upper extremity including shoulder (HCC)     1/12/2024 Surgery    B. Lymph node, left axillary sentinel lymph node #1:     -Two lymph nodes; one lymph node with rare solitary cells (<0.1 mm in diameter) suspicious (though not definitive) for metastatic melanoma cells (1/2 lymph nodes) (see comment).     -Incidental capsular/trabecular nevus.        C. Skin, left upper arm, excision:      -Residual focal atypical dermal melanocytic proliferation, consistent with residual melanoma (thickness: 0.7 mm); not seen at examined inked specimen margins.     -Prior procedure site changes present.     1/23/2024 -  Cancer Staged    Staging form: Melanoma of the Skin, AJCC 8th Edition  - Pathologic: Stage IB (pT2a, pN0, cM0) - Signed by Nisha Garcia MD on 1/23/2024          Cancer Staging   Malignant melanoma of left upper extremity including shoulder (HCC)  Staging form: Melanoma of the Skin, AJCC 8th Edition  - Clinical stage from 9/28/2023: Stage IB (cT2a, cN0, cM0) - Signed by Alaina Clarke MD on 12/7/2023  - Pathologic: Stage IB (pT2a, pN0, cM0) - Signed by Nisha Garcia MD on 1/23/2024          HISTORY OF PRESENT ILLNESS: Sandrita Herron is a 39 y.o. female  who has stage Ib melanoma here for continued monitoring, follow-up and surveillance.    She is doing well.  No concerns or complaints.  Eating okay.  Energy is good.  Follows closely with dermatology and recently saw them within the past couple months.  Scar is healed well although she is not happy with the appearance.  She states that it is sensitive but there are areas around it that have no feeling.  Also noting healing of the scar underneath her left arm with some scar tissue.    Denies any new, changing, concerning skin lesions.  Denies any lymphadenopathy.        REVIEW OF SYSTEMS:  Review of Systems   Constitutional:  Negative for appetite change, fatigue, fever and unexpected weight change.   HENT:   Negative for lump/mass, trouble swallowing and voice change.    Eyes:  Negative for icterus.   Respiratory:  Negative for cough, shortness of breath and wheezing.    Cardiovascular:  Negative for leg swelling.   Gastrointestinal:  Negative for abdominal pain, constipation, diarrhea, nausea and vomiting.   Genitourinary:  Negative for difficulty urinating and hematuria.    Musculoskeletal:  Negative for arthralgias, gait problem and  myalgias.   Skin:  Negative for itching and rash.        No new, changing, or concerning lesions.   Neurological:  Negative for extremity weakness, gait problem, headaches, light-headedness and numbness.   Hematological:  Negative for adenopathy.        MEDICATIONS:    Current Outpatient Medications:   •  albuterol (Ventolin HFA) 90 mcg/act inhaler, Inhale 2 puffs every 4 (four) hours as needed for wheezing or shortness of breath, Disp: 18 g, Rfl: 0  •  metFORMIN (GLUCOPHAGE-XR) 500 mg 24 hr tablet, Take 1 tablet (500 mg total) by mouth daily with dinner, Disp: 30 tablet, Rfl: 0  •  minoxidil (LONITEN) 2.5 mg tablet, Take 0.5 tablets (1.25 mg total) by mouth daily at bedtime, Disp: 7 tablet, Rfl: 2  •  Semaglutide-Weight Management (Wegovy) 1.7 MG/0.75ML, Inject 1.7 mg under the skin weekly, Disp: 3 mL, Rfl: 0  •  spironolactone (ALDACTONE) 50 mg tablet, Take 1 pill (50 mg) once daily for two weeks. Take with large glass of water. If well-tolerated, increase to 2 pills (100 mg) daily. Avoid large amounts of high potassium foods while on this medication., Disp: 180 tablet, Rfl: 3  •  clindamycin (CLEOCIN T) 1 % lotion, Apply topically 2 (two) times a day (Patient not taking: Reported on 11/5/2024), Disp: 60 mL, Rfl: 3  •  ondansetron (ZOFRAN-ODT) 8 mg disintegrating tablet, Take 1 tablet (8 mg total) by mouth every 8 (eight) hours as needed for nausea for up to 3 days, Disp: 10 tablet, Rfl: 0     ALLERGIES:  Allergies   Allergen Reactions   • Sulfa Antibiotics Other (See Comments)     Pt does not know what happens when she takes was a child when had the allergy   • Metformin GI Intolerance        ACTIVE PROBLEMS:  Patient Active Problem List   Diagnosis   • Allergic rhinitis   • Moderate persistent asthma   • Fatigue   • Migraine without status migrainosus, not intractable   • Hyperglycemia   • Dyslipidemia   • Morbid obesity with BMI of 40.0-44.9, adult (HCC)   • Back skin lesion   • Malignant melanoma of left upper  extremity including shoulder (HCC)   • Lipoma   • Encounter for follow-up surveillance of melanoma   • Hidradenitis suppurativa          PAST MEDICAL HISTORY:   Past Medical History:   Diagnosis Date   • Asthma     had vaccine    • Cancer (HCC)     melanoma   • Diabetes mellitus (HCC)     gestational DM diet controlled   • Infertility 2017    only got  sperm tested, naturally conceived    • Lumbar strain 2015   • Melanoma (HCC)     Punch Bx left upper extremity   • Pneumonia    • Varicella     chicken pox as child   • Visual impairment     eyewear         PAST SURGICAL HISTORY:  Past Surgical History:   Procedure Laterality Date   • FOOT SURGERY     • LIPOMA RESECTION Left 2024    Procedure: LEFT UPPER ARM LIPOMA EXCISION;  Surgeon: Nisha Garcia MD;  [facility];  Service: Surgical Oncology   • LYMPH NODE BIOPSY Left 2024    Procedure: SENTINEL LYMPH NODE BIOPSY (INJECT IN NUC MED BY RADIOLOGIST AT 11:30AM);  Surgeon: Nisha Garcia MD;  [facility];  Service: Surgical Oncology   • SKIN LESION EXCISION Left 2024    Procedure: LEFT UPPER ARM MELANOMA WIDE LOCAL EXCISION;  Surgeon: Nisha Garcia MD;  [facility];  Service: Surgical Oncology   • US GUIDED BREAST BIOPSY LEFT COMPLETE Left 2023   • WISDOM TOOTH EXTRACTION          SOCIAL HISTORY:  Social History     Socioeconomic History   • Marital status: /Civil Union     Spouse name: Not on file   • Number of children: Not on file   • Years of education: Not on file   • Highest education level: Not on file   Occupational History   • Occupation: [employer]     Employer: [employer]   Tobacco Use   • Smoking status: Former     Current packs/day: 0.00     Average packs/day: 0.5 packs/day for 12.5 years (6.3 ttl pk-yrs)     Types: Cigarettes     Start date:      Quit date: 2017     Years since quittin.4   • Smokeless tobacco: Never   Vaping Use   • Vaping status: Never  "Used   Substance and Sexual Activity   • Alcohol use: Yes     Comment: barely   • Drug use: No   • Sexual activity: Yes     Partners: Male     Birth control/protection: None     Comment: withdrawl   Other Topics Concern   • Not on file   Social History Narrative    Caffeine use     Social Drivers of Health     Financial Resource Strain: Not on file   Food Insecurity: Not on file   Transportation Needs: Not on file   Physical Activity: Not on file   Stress: Not on file   Social Connections: Not on file   Intimate Partner Violence: Not on file   Housing Stability: Not on file        FAMILY HISTORY:  Family History   Problem Relation Age of Onset   • Multiple sclerosis Mother    • Skin cancer Mother    • Cancer Mother    • Other Mother         Myocardial bridge   • Diabetes Father    • Other Father         Pericarditis   • Miscarriages / Stillbirths Sister    • Asthma Maternal Grandmother    • Diabetes Paternal Grandmother    • No Known Problems Son    • Learning disabilities Other    • No Known Problems Paternal Aunt    • No Known Problems Maternal Aunt    • Breast cancer Neg Hx    • Colon cancer Neg Hx    • Ovarian cancer Neg Hx    • Uterine cancer Neg Hx    • Cervical cancer Neg Hx            Objective    PHYSICAL EXAMINATION:   Blood pressure 122/78, pulse 93, temperature (!) 97.2 °F (36.2 °C), temperature source Temporal, resp. rate 18, height 5' 7\" (1.702 m), weight 122 kg (268 lb), SpO2 92%, not currently breastfeeding.     Pain Score: 0-No pain     ECOG Performance Status      Flowsheet Row Most Recent Value   ECOG Performance Status 0 - Fully active, able to carry on all pre-disease performance without restriction               Physical Exam  Constitutional:       General: She is not in acute distress.     Appearance: Normal appearance. She is not ill-appearing or toxic-appearing.   HENT:      Head: Normocephalic and atraumatic.      Right Ear: External ear normal.      Left Ear: External ear normal.      " Nose: Nose normal.      Mouth/Throat:      Mouth: Mucous membranes are moist.      Pharynx: Oropharynx is clear.   Eyes:      General: No scleral icterus.        Right eye: No discharge.         Left eye: No discharge.      Conjunctiva/sclera: Conjunctivae normal.   Cardiovascular:      Rate and Rhythm: Normal rate and regular rhythm.      Pulses: Normal pulses.      Heart sounds: No murmur heard.     No friction rub. No gallop.   Pulmonary:      Effort: Pulmonary effort is normal. No respiratory distress.      Breath sounds: Normal breath sounds. No wheezing or rales.   Abdominal:      General: Bowel sounds are normal. There is no distension.      Palpations: There is no mass.      Tenderness: There is no abdominal tenderness. There is no rebound.   Musculoskeletal:         General: No swelling or tenderness.      Cervical back: Normal range of motion. No rigidity.      Right lower leg: No edema.      Left lower leg: No edema.   Lymphadenopathy:      Head:      Right side of head: No submandibular, preauricular or posterior auricular adenopathy.      Left side of head: No submandibular, preauricular or posterior auricular adenopathy.      Cervical: No cervical adenopathy.      Right cervical: No superficial or posterior cervical adenopathy.     Left cervical: No superficial or posterior cervical adenopathy.      Upper Body:      Right upper body: No supraclavicular or axillary adenopathy.      Left upper body: No supraclavicular or axillary adenopathy.   Skin:     General: Skin is warm.      Coloration: Skin is not jaundiced.      Findings: No lesion or rash.      Comments: Well healed surgical scar.  No evidence of recurrence at primary site.   Neurological:      General: No focal deficit present.      Mental Status: She is alert and oriented to person, place, and time.      Cranial Nerves: No cranial nerve deficit.      Motor: No weakness.      Gait: Gait normal.   Psychiatric:         Mood and Affect: Mood  normal.         Behavior: Behavior normal.         Thought Content: Thought content normal.         Judgment: Judgment normal.         I reviewed lab data in the chart.    WBC   Date Value Ref Range Status   12/05/2024 5.49 4.31 - 10.16 Thousand/uL Final   01/05/2024 6.51 4.31 - 10.16 Thousand/uL Final   08/29/2022 7.25 4.31 - 10.16 Thousand/uL Final   03/14/2014 7.09 4.31 - 10.16 Thousand/uL Final     Comment:     New Reference Range     Hemoglobin   Date Value Ref Range Status   12/05/2024 12.4 11.5 - 15.4 g/dL Final   01/05/2024 12.3 11.5 - 15.4 g/dL Final   08/29/2022 12.5 11.5 - 15.4 g/dL Final   03/14/2014 13.3 11.5 - 15.4 g/dL Final     Comment:     New Reference Range     Platelets   Date Value Ref Range Status   12/05/2024 272 149 - 390 Thousands/uL Final   01/05/2024 274 149 - 390 Thousands/uL Final   08/29/2022 295 149 - 390 Thousands/uL Final   03/14/2014 342 149 - 390 Thousand/uL Final     Comment:     New Reference Range     MCV   Date Value Ref Range Status   12/05/2024 89 82 - 98 fL Final   01/05/2024 88 82 - 98 fL Final   08/29/2022 90 82 - 98 fL Final   03/14/2014 88 82 - 98 fL Final     Comment:     New Reference Range      Sodium   Date Value Ref Range Status   03/14/2014 137 135 - 145 mmol/L Final     Potassium   Date Value Ref Range Status   12/05/2024 3.8 3.5 - 5.3 mmol/L Final   01/05/2024 4.0 3.5 - 5.3 mmol/L Final   02/08/2023 4.1 3.5 - 5.3 mmol/L Final   03/14/2014 4.3 3.6 - 5.0 mmol/L Final     Chloride   Date Value Ref Range Status   12/05/2024 105 96 - 108 mmol/L Final   01/05/2024 104 96 - 108 mmol/L Final   02/08/2023 104 96 - 108 mmol/L Final   03/14/2014 103 101 - 111 mmol/L Final     CO2   Date Value Ref Range Status   12/05/2024 25 21 - 32 mmol/L Final   01/05/2024 26 21 - 32 mmol/L Final   02/08/2023 26 21 - 32 mmol/L Final   03/14/2014 27 21 - 31 mmol/L Final     BUN   Date Value Ref Range Status   12/05/2024 13 5 - 25 mg/dL Final   01/05/2024 13 5 - 25 mg/dL Final   02/08/2023  11 5 - 25 mg/dL Final   03/14/2014 11 5 - 27 mg/dL Final     Creatinine   Date Value Ref Range Status   12/05/2024 0.87 0.60 - 1.30 mg/dL Final     Comment:     Standardized to IDMS reference method   01/05/2024 0.79 0.60 - 1.30 mg/dL Final     Comment:     Standardized to IDMS reference method   02/08/2023 0.85 0.60 - 1.30 mg/dL Final     Comment:     Standardized to IDMS reference method   03/14/2014 0.72 0.60 - 1.30 mg/dL Final     Comment:     Standardized to IDMS reference method     Glucose   Date Value Ref Range Status   12/05/2024 121 65 - 140 mg/dL Final     Comment:     If the patient is fasting, the ADA then defines impaired fasting glucose as > 100 mg/dL and diabetes as > or equal to 123 mg/dL.   08/29/2022 98 65 - 140 mg/dL Final     Comment:     If the patient is fasting, the ADA then defines impaired fasting glucose as > 100 mg/dL and diabetes as > or equal to 123 mg/dL.  Specimen collection should occur prior to Sulfasalazine administration due to the potential for falsely depressed results. Specimen collection should occur prior to Sulfapyridine administration due to the potential for falsely elevated results.   06/04/2020 120 65 - 140 mg/dL Final     Comment:       If the patient is fasting, the ADA then defines impaired fasting glucose as > 100 mg/dL and diabetes as > or equal to 123 mg/dL.  Specimen collection should occur prior to Sulfasalazine administration due to the potential for falsely depressed results. Specimen collection should occur prior to Sulfapyridine administration due to the potential for falsely elevated results.     Calcium   Date Value Ref Range Status   12/05/2024 8.6 8.4 - 10.2 mg/dL Final   01/05/2024 9.0 8.4 - 10.2 mg/dL Final   02/08/2023 8.8 8.4 - 10.2 mg/dL Final   03/14/2014 8.9 8.3 - 10.1 mg/dL Final     Total Protein   Date Value Ref Range Status   03/14/2014 7.5 6.4 - 8.2 g/dL Final     Albumin   Date Value Ref Range Status   12/05/2024 4.1 3.5 - 5.0 g/dL Final    01/05/2024 4.1 3.5 - 5.0 g/dL Final   02/08/2023 4.1 3.5 - 5.0 g/dL Final   03/14/2014 3.9 3.5 - 5.0 g/dL Final     Total Bilirubin   Date Value Ref Range Status   12/05/2024 0.90 0.20 - 1.00 mg/dL Final     Comment:     Use of this assay is not recommended for patients undergoing treatment with eltrombopag due to the potential for falsely elevated results.  N-acetyl-p-benzoquinone imine (metabolite of Acetaminophen) will generate erroneously low results in samples for patients that have taken an overdose of Acetaminophen.   01/05/2024 0.57 0.20 - 1.00 mg/dL Final     Comment:     Use of this assay is not recommended for patients undergoing treatment with eltrombopag due to the potential for falsely elevated results.  N-acetyl-p-benzoquinone imine (metabolite of Acetaminophen) will generate erroneously low results in samples for patients that have taken an overdose of Acetaminophen.   02/08/2023 0.66 0.20 - 1.00 mg/dL Final     Alkaline Phosphatase   Date Value Ref Range Status   12/05/2024 62 34 - 104 U/L Final   01/05/2024 59 34 - 104 U/L Final   02/08/2023 64 34 - 104 U/L Final   03/14/2014 83 42 - 121 U/L Final     AST   Date Value Ref Range Status   12/05/2024 15 13 - 39 U/L Final   01/05/2024 13 13 - 39 U/L Final   02/08/2023 14 13 - 39 U/L Final     Comment:     Specimen collection should occur prior to Sulfasalazine administration due to the potential for falsely depressed results.    03/14/2014 19 10 - 42 U/L Final     ALT   Date Value Ref Range Status   12/05/2024 21 7 - 52 U/L Final     Comment:     Specimen collection should occur prior to Sulfasalazine administration due to the potential for falsely depressed results.    01/05/2024 21 7 - 52 U/L Final     Comment:     Specimen collection should occur prior to Sulfasalazine administration due to the potential for falsely depressed results.    02/08/2023 19 7 - 52 U/L Final     Comment:     Specimen collection should occur prior to Sulfasalazine  "administration due to the potential for falsely depressed results.    03/14/2014 30 6 - 78 U/L Final      LD   Date Value Ref Range Status   12/05/2024 160 140 - 271 U/L Final   01/05/2024 144 140 - 271 U/L Final     No results found for: \"TSH\"  No results found for: \"U2BSHEV\"   Free T4   Date Value Ref Range Status   12/05/2024 0.83 0.61 - 1.12 ng/dL Final     Comment:     Specimens with biotin concentrations > 10 ng/mL can lead to significant (> 10%) positive bias in result.         RECENT IMAGING:  No results found.          Assessment/Plan  Ms. Herron is a 39-year-old female with stage Ib melanoma here for continued monitoring, follow-up and surveillance.    1. Encounter for follow-up surveillance of melanoma (Primary)  2. Malignant melanoma of left upper extremity including shoulder (HCC)  She is doing well with no clinical evidence of melanoma recurrence.  Labs reviewed and okay.  She knows to continue to monitor for any new, changing, concerning skin lesions.  She knows to monitor for lymphadenopathy.  She will return in 6 months with blood work at that time.  Orders placed and prescription provided.  She knows to call with issues or concerns prior to her next visit.    - CBC and differential; Future  - Comprehensive metabolic panel; Future  - LD,Blood; Future        Return in about 6 months (around 6/12/2025) for labs.     Alaina Clarke MD, PhD  "

## 2024-12-23 ENCOUNTER — TELEPHONE (OUTPATIENT)
Dept: SURGICAL ONCOLOGY | Facility: CLINIC | Age: 39
End: 2024-12-23

## 2024-12-23 NOTE — TELEPHONE ENCOUNTER
Called patient for a second attempt to reschedule appointment with Dr. Garcia on 1/14/25 due to provider's schedule.

## 2025-01-03 NOTE — TELEPHONE ENCOUNTER
Called patient for a third attempt and left message to reschedule appointment with Dr. Garcia on 1/14/25. A letter will be sent to patient.

## 2025-01-06 RX ORDER — SEMAGLUTIDE 2.4 MG/.75ML
INJECTION, SOLUTION SUBCUTANEOUS
Qty: 3 ML | Refills: 0 | Status: SHIPPED | OUTPATIENT
Start: 2025-01-06

## 2025-02-04 ENCOUNTER — OFFICE VISIT (OUTPATIENT)
Dept: DERMATOLOGY | Facility: CLINIC | Age: 40
End: 2025-02-04
Payer: COMMERCIAL

## 2025-02-04 VITALS — WEIGHT: 251 LBS | BODY MASS INDEX: 39.31 KG/M2

## 2025-02-04 DIAGNOSIS — L81.4 SOLAR LENTIGO: ICD-10-CM

## 2025-02-04 DIAGNOSIS — Z85.820 HISTORY OF MELANOMA: Primary | ICD-10-CM

## 2025-02-04 DIAGNOSIS — Z12.83 SKIN EXAM, SCREENING FOR CANCER: ICD-10-CM

## 2025-02-04 DIAGNOSIS — L82.1 SEBORRHEIC KERATOSES: ICD-10-CM

## 2025-02-04 DIAGNOSIS — D18.01 CHERRY ANGIOMA: ICD-10-CM

## 2025-02-04 DIAGNOSIS — D22.71 MULTIPLE BENIGN MELANOCYTIC NEVI OF BOTH UPPER EXTREMITIES, BOTH LOWER EXTREMITIES, AND TRUNK: ICD-10-CM

## 2025-02-04 DIAGNOSIS — D22.5 MULTIPLE BENIGN MELANOCYTIC NEVI OF BOTH UPPER EXTREMITIES, BOTH LOWER EXTREMITIES, AND TRUNK: ICD-10-CM

## 2025-02-04 DIAGNOSIS — D22.72 MULTIPLE BENIGN MELANOCYTIC NEVI OF BOTH UPPER EXTREMITIES, BOTH LOWER EXTREMITIES, AND TRUNK: ICD-10-CM

## 2025-02-04 DIAGNOSIS — D22.61 MULTIPLE BENIGN MELANOCYTIC NEVI OF BOTH UPPER EXTREMITIES, BOTH LOWER EXTREMITIES, AND TRUNK: ICD-10-CM

## 2025-02-04 DIAGNOSIS — D22.62 MULTIPLE BENIGN MELANOCYTIC NEVI OF BOTH UPPER EXTREMITIES, BOTH LOWER EXTREMITIES, AND TRUNK: ICD-10-CM

## 2025-02-04 PROCEDURE — 99213 OFFICE O/P EST LOW 20 MIN: CPT | Performed by: DERMATOLOGY

## 2025-02-04 NOTE — PROGRESS NOTES
"[facility] Dermatology Clinic Note     Patient Name: Sandrita Herron  Encounter Date: 2/4/2025     Have you been cared for by a [facility] Dermatologist in the last 3 years and, if so, which description applies to you?    Yes.  I have been here within the last 3 years, and my medical history has NOT changed since that time.  I am FEMALE/of child-bearing potential.    REVIEW OF SYSTEMS:  Have you recently had or currently have any of the following? No changes in my recent health.   PAST MEDICAL HISTORY:  Have you personally ever had or currently have any of the following?  If \"YES,\" then please provide more detail. No changes in my medical history.   HISTORY OF IMMUNOSUPPRESSION: Do you have a history of any of the following:  Systemic Immunosuppression such as Diabetes, Biologic or Immunotherapy, Chemotherapy, Organ Transplantation, Bone Marrow Transplantation or Prednisone?  No     Answering \"YES\" requires the addition of the dotphrase \"IMMUNOSUPPRESSED\" as the first diagnosis of the patient's visit.   FAMILY HISTORY:  Any \"first degree relatives\" (parent, brother, sister, or child) with the following?    No changes in my family's known health.   PATIENT EXPERIENCE:    Do you want the Dermatologist to perform a COMPLETE skin exam today including a clinical examination under the \"bra and underwear\" areas?  Yes  If necessary, do we have your permission to call and leave a detailed message on your Preferred Phone number that includes your specific medical information?  Yes      Allergies   Allergen Reactions    Sulfa Antibiotics Other (See Comments)     Pt does not know what happens when she takes was a child when had the allergy    Metformin GI Intolerance      Current Outpatient Medications:     albuterol (Ventolin HFA) 90 mcg/act inhaler, Inhale 2 puffs every 4 (four) hours as needed for wheezing or shortness of breath, Disp: 18 g, Rfl: 0    metFORMIN (GLUCOPHAGE-XR) 500 mg 24 hr tablet, Take 1 tablet (500 mg " total) by mouth daily with dinner, Disp: 30 tablet, Rfl: 0    Semaglutide-Weight Management (Wegovy) 2.4 MG/0.75ML, Inject 2.4 mg under the skin weekly, Disp: 3 mL, Rfl: 0    spironolactone (ALDACTONE) 50 mg tablet, Take 1 pill (50 mg) once daily for two weeks. Take with large glass of water. If well-tolerated, increase to 2 pills (100 mg) daily. Avoid large amounts of high potassium foods while on this medication., Disp: 180 tablet, Rfl: 3    clindamycin (CLEOCIN T) 1 % lotion, Apply topically 2 (two) times a day (Patient not taking: Reported on 11/5/2024), Disp: 60 mL, Rfl: 3    minoxidil (LONITEN) 2.5 mg tablet, Take 0.5 tablets (1.25 mg total) by mouth daily at bedtime, Disp: 7 tablet, Rfl: 2    ondansetron (ZOFRAN-ODT) 8 mg disintegrating tablet, Take 1 tablet (8 mg total) by mouth every 8 (eight) hours as needed for nausea for up to 3 days, Disp: 10 tablet, Rfl: 0        Whom besides the patient is providing clinical information about today's encounter?   NO ADDITIONAL HISTORIAN (patient alone provided history)    Physical Exam and Assessment/Plan by Diagnosis:    HISTORY OF MELANOMA     Physical Exam:  Anatomic Location Affected:  left upper arm  Morphological Description of Scar:  well healed  Year Treated: 2024  TNM Classification: pT2a (1.2 mm Breslow)  Suspected Recurrence: no  Regional adenopathy: no       Malignant melanoma of left upper extremity including shoulder (HCC)  Staging form: Melanoma of the Skin, AJCC 8th Edition  - Clinical stage from 9/28/2023: Stage IB (cT2a, cN0, cM0)   - Pathologic: Stage IB (pT2a, pN0, cM0)      5/18/2023 Biopsy     Left upper arm, shave biopsy   Thickness 1.2mm  1 Mitoses   No ulceration           1/12/2024 Surgery     B. Lymph node, left axillary sentinel lymph node #1:     -Two lymph nodes; one lymph node with rare solitary cells (<0.1 mm in diameter) suspicious (though not definitive) for metastatic melanoma cells (1/2 lymph nodes) (see comment).     -Incidental  "capsular/trabecular nevus.        C. Skin, left upper arm, excision:     -Residual focal atypical dermal melanocytic proliferation, consistent with residual melanoma (thickness: 0.7 mm); not seen at examined inked specimen margins.     -Prior procedure site changes present.       Additional History of Present Condition:  Patient had an excision on 1/12/24 by Dr. Garcia in surgical oncology. They removed one lymph node. She is following Dr. Clarke every 6 months. She was last seen by Dr Clarke 1/24/24. She has also had atypical nevi excised on her right upper arm and back in Dec 2023. She denies fevers, chills, night sweats, weight loss, headache, visual change, bone pain, paraesthesias, cough, SOB, abdominal pain, n/v/d, and is feeling at their baseline health.      Assessment and Plan:  Based on a thorough discussion of this condition and the management approach to it (including a comprehensive discussion of the known risks, side effects and potential benefits of treatment), the patient (family) agrees to implement the following specific plan:  Skin checks every 3-6 months  No LAD, neg ROS, and well healing scar on exam  Continue routine skin, eye, dental, OB/GYN exams  Sun protection with SPF 50 or higher, with sun protective clothing  Has follow up scheduled with Dr. Garcia on 1/14/25       What happens at follow-up?  The main purpose of follow-up is to detect recurrences early (metastatic melanoma), but it also offers an opportunity to diagnose a new primary melanoma at the first possible opportunity. A second invasive melanoma occurs in 5-10% of melanoma patients and a new melanoma in situ is diagnosed in more than 20% of melanoma patients.     Our practice makes the following recommendations for follow-up for patients with invasive melanoma.  At-least \"monthly\" self-skin examinations   Routine skin checks by a board certified dermatologist  Follow-up intervals are \"every 3 months\" within 2 years of a new melanoma " "diagnosis; \"every 6 months\" between 2-4 years of a new melanoma diagnosis; and \"annually\" after 4 years of a new melanoma diagnosis  Individual patient's needs should be considered before an appropriate follow-up is offered  Provide education and support to help the patient adjust to their illness     Follow-up appointments should include:  A check of the scar where the primary melanoma was removed  Checking the regional lymph nodes  A general skin examination  A full physical examination at least annually by your primary care physician     In those with more advanced primary disease, follow-up may include:  Blood tests  Imaging: ultrasound, X-ray, CT, MRI and PET scan.     Most tests are not worthwhile for patients with stage 1 or 2 melanoma unless there are signs or symptoms of disease recurrence or metastasis. No tests are necessary for healthy patients who have remained well for five years or longer after removal of their melanoma.     What is the outlook for patients with melanoma?  Melanoma in situ is cured by excision because it has no potential to spread around the body.  The risk of spread and ultimate death from invasive melanoma depends on several factors, but the main one is the Breslow thickness of the melanoma at the time it was surgically removed.  Metastases are rare for melanomas < 0.75 mm and the risk for tumours 0.75-1 mm thick is about 5%. The risk steadily increases with thickness so that melanomas > 4 mm have a risk of metastasis of about 40%.     Melanoma is a potentially serious type of skin cancer, in which there is uncontrolled growth of melanocytes (pigment cells). Melanoma is sometimes called malignant melanoma.  Normal melanocytes are found in the basal layer of the epidermis (the outer layer of skin). Melanocytes produce a protein called melanin, which protects skin cells by absorbing ultraviolet (UV) radiation. Melanocytes are found in equal numbers in black and white skin, but " melanocytes in black skin produce much more melanin. People with dark brown or black skin are very much less likely to be damaged by UV radiation than those with white skin.    SEBORRHEIC KERATOSES  - Relevant exam: Scattered over the trunk/extremities are waxy brown to black plaques and papules with stuck on appearance and consistent dermoscopy  - Exam and clinical history consistent with seborrheic keratoses  - Counseled that these are benign growths that do not require treatment    MELANOCYTIC NEVI  -Relevant exam: Scattered over the trunk/extremities are homogenously pigmented brown macules and papules. ELM performed and without concerning findings. No outliers unless otherwise noted in today's note  - Exam and clinical history consistent with melanocytic nevi  - Counseled to return to clinic prior to scheduled appointment should any of these lesions change or should any new lesions of concern arise  - Counseled on use of sun protection daily. Reviewed latest FDA sunscreen guidelines, including use of broad spectrum (UVA and UVB blocking) sunscreen or sun protective clothing with SPF 30-50 every 2-3 hours and reapplied after exposure to water    LENTIGINES  OTHER SKIN CHANGES DUE TO CHRONIC EXPOSURE TO NONIONIZING RADIATION  - Relevant exam: Over sun exposed areas are brown macules. ELM performed and without concerning findings.  - Exam and clinical history consistent with lentigines.  - Counseled to return to clinic prior to scheduled appointment should any of these lesions change or should any new lesions of concern arise.  - Recommended use of sunscreen as above and below.    CHERRY ANGIOMAS  - Relevant exam: Scattered over the trunk/extremities are red papules  - Exam and clinical history consistent with cherry angiomas  - Educated that these are benign    Scribe Attestation      I,:  Elizabeth Huston MA am acting as a scribe while in the presence of the attending physician.:       I,:  Taylor Hart MD  personally performed the services described in this documentation    as scribed in my presence.:

## 2025-02-05 DIAGNOSIS — G43.909 MIGRAINE WITHOUT STATUS MIGRAINOSUS, NOT INTRACTABLE, UNSPECIFIED MIGRAINE TYPE: ICD-10-CM

## 2025-02-05 DIAGNOSIS — R73.9 HYPERGLYCEMIA: ICD-10-CM

## 2025-02-05 DIAGNOSIS — L73.2 HIDRADENITIS SUPPURATIVA: ICD-10-CM

## 2025-02-05 DIAGNOSIS — E66.01 MORBID OBESITY WITH BMI OF 40.0-44.9, ADULT (HCC): ICD-10-CM

## 2025-02-06 RX ORDER — SEMAGLUTIDE 2.4 MG/.75ML
INJECTION, SOLUTION SUBCUTANEOUS
Qty: 3 ML | Refills: 0 | Status: SHIPPED | OUTPATIENT
Start: 2025-02-06

## 2025-02-06 RX ORDER — ONDANSETRON 8 MG/1
8 TABLET, ORALLY DISINTEGRATING ORAL EVERY 8 HOURS PRN
Qty: 10 TABLET | Refills: 0 | Status: SHIPPED | OUTPATIENT
Start: 2025-02-06 | End: 2025-02-09

## 2025-02-06 RX ORDER — METFORMIN HYDROCHLORIDE 500 MG/1
500 TABLET, EXTENDED RELEASE ORAL
Qty: 90 TABLET | Refills: 1 | Status: SHIPPED | OUTPATIENT
Start: 2025-02-06

## 2025-03-09 DIAGNOSIS — E66.01 MORBID OBESITY WITH BMI OF 40.0-44.9, ADULT (HCC): ICD-10-CM

## 2025-03-11 RX ORDER — SEMAGLUTIDE 2.4 MG/.75ML
INJECTION, SOLUTION SUBCUTANEOUS
Qty: 3 ML | Refills: 0 | Status: SHIPPED | OUTPATIENT
Start: 2025-03-11

## 2025-04-08 DIAGNOSIS — E66.01 MORBID OBESITY WITH BMI OF 40.0-44.9, ADULT (HCC): ICD-10-CM

## 2025-04-09 RX ORDER — SEMAGLUTIDE 2.4 MG/.75ML
INJECTION, SOLUTION SUBCUTANEOUS
Qty: 3 ML | Refills: 0 | Status: SHIPPED | OUTPATIENT
Start: 2025-04-09

## 2025-04-09 NOTE — TELEPHONE ENCOUNTER
Requested Prescriptions     Pending Prescriptions Disp Refills    Semaglutide-Weight Management (Wegovy) 2.4 MG/0.75ML 3 mL 0     Sig: Inject 2.4 mg under the skin weekly

## 2025-04-10 ENCOUNTER — TELEPHONE (OUTPATIENT)
Dept: FAMILY MEDICINE CLINIC | Facility: CLINIC | Age: 40
End: 2025-04-10

## 2025-04-10 NOTE — TELEPHONE ENCOUNTER
PA for Wegovy 2.4MG/0.75M CANCELLED due to     [x]Approval on file-dates approved 09/16/2024-09/16/2025  []Medication already on Formulary  []Brand Name Preferred  []Patient no longer covered by insurance  []Therapy Changed/Medication Discontinued        Scanned into Media  yes

## 2025-05-05 DIAGNOSIS — E66.01 MORBID OBESITY WITH BMI OF 40.0-44.9, ADULT (HCC): ICD-10-CM

## 2025-05-06 ENCOUNTER — OFFICE VISIT (OUTPATIENT)
Dept: DERMATOLOGY | Facility: CLINIC | Age: 40
End: 2025-05-06
Payer: COMMERCIAL

## 2025-05-06 VITALS — TEMPERATURE: 97.8 F

## 2025-05-06 DIAGNOSIS — L65.0 TELOGEN EFFLUVIUM: ICD-10-CM

## 2025-05-06 DIAGNOSIS — L64.9 ANDROGENETIC ALOPECIA: Primary | ICD-10-CM

## 2025-05-06 PROCEDURE — 99214 OFFICE O/P EST MOD 30 MIN: CPT | Performed by: DERMATOLOGY

## 2025-05-06 RX ORDER — SEMAGLUTIDE 2.4 MG/.75ML
INJECTION, SOLUTION SUBCUTANEOUS
Qty: 3 ML | Refills: 3 | Status: SHIPPED | OUTPATIENT
Start: 2025-05-06 | End: 2025-05-15

## 2025-05-06 RX ORDER — MINOXIDIL 2.5 MG/1
TABLET ORAL
Start: 2025-05-06 | End: 2025-05-06

## 2025-05-06 RX ORDER — MINOXIDIL 2.5 MG/1
1.25 TABLET ORAL DAILY
Qty: 45 TABLET | Refills: 3 | Status: SHIPPED | OUTPATIENT
Start: 2025-05-06

## 2025-05-06 NOTE — PROGRESS NOTES
"[facility] Dermatology Clinic Note     Patient Name: Sandrita Herron  Encounter Date: 5/6/25       Have you been cared for by a [facility] Dermatologist in the last 3 years and, if so, which description applies to you? Yes. I have been here within the last 3 years, and my medical history has NOT changed since that time. I am of child-bearing potential.     REVIEW OF SYSTEMS:  Have you recently had or currently have any of the following? No changes in my recent health.   PAST MEDICAL HISTORY:  Have you personally ever had or currently have any of the following?  If \"YES,\" then please provide more detail. No changes in my medical history.   HISTORY OF IMMUNOSUPPRESSION: Do you have a history of any of the following:  Systemic Immunosuppression such as Diabetes, Biologic or Immunotherapy, Chemotherapy, Organ Transplantation, Bone Marrow Transplantation or Prednisone?  No     Answering \"YES\" requires the addition of the dotphrase \"IMMUNOSUPPRESSED\" as the first diagnosis of the patient's visit.   FAMILY HISTORY:  Any \"first degree relatives\" (parent, brother, sister, or child) with the following?    No changes in my family's known health.   PATIENT EXPERIENCE:    Do you want the Dermatologist to perform a COMPLETE skin exam today including a clinical examination under the \"bra and underwear\" areas?  Yes  If necessary, do we have your permission to call and leave a detailed message on your Preferred Phone number that includes your specific medical information?  Yes      Allergies   Allergen Reactions    Sulfa Antibiotics Other (See Comments)     Pt does not know what happens when she takes was a child when had the allergy    Metformin GI Intolerance      Current Outpatient Medications:     albuterol (Ventolin HFA) 90 mcg/act inhaler, Inhale 2 puffs every 4 (four) hours as needed for wheezing or shortness of breath, Disp: 18 g, Rfl: 0    metFORMIN (GLUCOPHAGE-XR) 500 mg 24 hr tablet, Take 1 tablet (500 mg total) by mouth " daily with dinner, Disp: 90 tablet, Rfl: 1    Semaglutide-Weight Management (Wegovy) 2.4 MG/0.75ML, Inject 2.4 mg under the skin weekly, Disp: 3 mL, Rfl: 3    spironolactone (ALDACTONE) 50 mg tablet, Take 1 pill (50 mg) once daily for two weeks. Take with large glass of water. If well-tolerated, increase to 2 pills (100 mg) daily. Avoid large amounts of high potassium foods while on this medication., Disp: 180 tablet, Rfl: 3    clindamycin (CLEOCIN T) 1 % lotion, Apply topically 2 (two) times a day (Patient not taking: Reported on 11/5/2024), Disp: 60 mL, Rfl: 3    minoxidil (LONITEN) 2.5 mg tablet, Take 0.5 tablets (1.25 mg total) by mouth daily at bedtime, Disp: 7 tablet, Rfl: 2    ondansetron (ZOFRAN-ODT) 8 mg disintegrating tablet, Take 1 tablet (8 mg total) by mouth every 8 (eight) hours as needed for nausea for up to 3 days, Disp: 10 tablet, Rfl: 0              Whom besides the patient is providing clinical information about today's encounter?   NO ADDITIONAL HISTORIAN (patient alone provided history)    Physical Exam and Assessment/Plan by Diagnosis:    HISTORY OF MELANOMA     Physical Exam:  Anatomic Location Affected:  left upper arm  Morphological Description of Scar:  well healed  Year Treated: 2024  TNM Classification: pT2a (1.2 mm Breslow)  Suspected Recurrence: no  Regional adenopathy: no        Malignant melanoma of left upper extremity including shoulder (HCC)  Staging form: Melanoma of the Skin, AJCC 8th Edition  - Clinical stage from 9/28/2023: Stage IB (cT2a, cN0, cM0)   - Pathologic: Stage IB (pT2a, pN0, cM0)      5/18/2023 Biopsy     Left upper arm, shave biopsy   Thickness 1.2mm  1 Mitoses   No ulceration           1/12/2024 Surgery     B. Lymph node, left axillary sentinel lymph node #1:     -Two lymph nodes; one lymph node with rare solitary cells (<0.1 mm in diameter) suspicious (though not definitive) for metastatic melanoma cells (1/2 lymph nodes) (see comment).     -Incidental  "capsular/trabecular nevus.        C. Skin, left upper arm, excision:     -Residual focal atypical dermal melanocytic proliferation, consistent with residual melanoma (thickness: 0.7 mm); not seen at examined inked specimen margins.     -Prior procedure site changes present.       Additional History of Present Condition:  Patient had an excision on 1/12/24 by Dr. Garcia in surgical oncology. One lymph node from [facility] with rare solitary cells suspicious though not definitive for melanoma. She is following Dr. Clarke every 6 months. She was last seen by Dr Clarke 12/12/24. She has also had atypical nevi excised on her right upper arm and back in Dec 2023. She denies fevers, chills, night sweats, intentional weight loss (losing from GLP1 medication), headache, visual change, bone pain, paraesthesias, cough, SOB, abdominal pain, n/v/d, and is feeling at their baseline health.      Assessment and Plan:  Based on a thorough discussion of this condition and the management approach to it (including a comprehensive discussion of the known risks, side effects and potential benefits of treatment), the patient (family) agrees to implement the following specific plan:  Skin checks every 3-6 months  No LAD, neg ROS, and well healing scar on exam  Continue routine skin, eye, dental, OB/GYN exams  Sun protection with SPF 50 or higher, with sun protective clothing  Has follow up scheduled with Dr. Clarke on 6/12/25        What happens at follow-up?  The main purpose of follow-up is to detect recurrences early (metastatic melanoma), but it also offers an opportunity to diagnose a new primary melanoma at the first possible opportunity. A second invasive melanoma occurs in 5-10% of melanoma patients and a new melanoma in situ is diagnosed in more than 20% of melanoma patients.     Our practice makes the following recommendations for follow-up for patients with invasive melanoma.  At-least \"monthly\" self-skin examinations   Routine skin " "checks by a board certified dermatologist  Follow-up intervals are \"every 3 months\" within 2 years of a new melanoma diagnosis; \"every 6 months\" between 2-4 years of a new melanoma diagnosis; and \"annually\" after 4 years of a new melanoma diagnosis  Individual patient's needs should be considered before an appropriate follow-up is offered  Provide education and support to help the patient adjust to their illness     Follow-up appointments should include:  A check of the scar where the primary melanoma was removed  Checking the regional lymph nodes  A general skin examination  A full physical examination at least annually by your primary care physician     In those with more advanced primary disease, follow-up may include:  Blood tests  Imaging: ultrasound, X-ray, CT, MRI and PET scan.     Most tests are not worthwhile for patients with stage 1 or 2 melanoma unless there are signs or symptoms of disease recurrence or metastasis. No tests are necessary for healthy patients who have remained well for five years or longer after removal of their melanoma.     What is the outlook for patients with melanoma?  Melanoma in situ is cured by excision because it has no potential to spread around the body.  The risk of spread and ultimate death from invasive melanoma depends on several factors, but the main one is the Breslow thickness of the melanoma at the time it was surgically removed.  Metastases are rare for melanomas < 0.75 mm and the risk for tumours 0.75-1 mm thick is about 5%. The risk steadily increases with thickness so that melanomas > 4 mm have a risk of metastasis of about 40%.     Melanoma is a potentially serious type of skin cancer, in which there is uncontrolled growth of melanocytes (pigment cells). Melanoma is sometimes called malignant melanoma.  Normal melanocytes are found in the basal layer of the epidermis (the outer layer of skin). Melanocytes produce a protein called melanin, which protects skin cells " by absorbing ultraviolet (UV) radiation. Melanocytes are found in equal numbers in black and white skin, but melanocytes in black skin produce much more melanin. People with dark brown or black skin are very much less likely to be damaged by UV radiation than those with white skin.    SEBORRHEIC KERATOSES  - Relevant exam: Scattered over the trunk/extremities are waxy brown to black plaques and papules with stuck on appearance and consistent dermoscopy  - Exam and clinical history consistent with seborrheic keratoses  - Counseled that these are benign growths that do not require treatment    MELANOCYTIC NEVI  -Relevant exam: Scattered over the trunk/extremities are homogenously pigmented brown macules and papules. ELM performed and without concerning findings. No outliers unless otherwise noted in today's note  - Exam and clinical history consistent with melanocytic nevi  - Counseled to return to clinic prior to scheduled appointment should any of these lesions change or should any new lesions of concern arise  - Counseled on use of sun protection daily. Reviewed latest FDA sunscreen guidelines, including use of broad spectrum (UVA and UVB blocking) sunscreen or sun protective clothing with SPF 30-50 every 2-3 hours and reapplied after exposure to water    LENTIGINES  OTHER SKIN CHANGES DUE TO CHRONIC EXPOSURE TO NONIONIZING RADIATION  - Relevant exam: Over sun exposed areas are brown macules. ELM performed and without concerning findings.  - Exam and clinical history consistent with lentigines.  - Counseled to return to clinic prior to scheduled appointment should any of these lesions change or should any new lesions of concern arise.  - Recommended use of sunscreen as above and below.    CHERRY ANGIOMAS  - Relevant exam: Scattered over the trunk/extremities are red papules  - Exam and clinical history consistent with cherry angiomas  - Educated that these are benign    TELOGEN EFFLUVIUM +/- ANDROGENETIC  ALOPECIA    Physical Exam:  Anatomic Location Affected:  scalp  Morphological Description:  thinning hair along the front hairline and part line       Additional History of Present Condition: 40 yo female with presumed TE secondary to weight loss. She was doing oral minoxidil 1.25 mg but there were no refills so she stopped taking it. She has also tried topical minoxidil but does not like using it. She is willing to restart the oral minoxidil. She had no side effects the first time she took it and thought it was helpful. Took spironolactone previously without noting benefit.    Assessment and Plan:  Based on a thorough discussion of this condition and the management approach to it (including a comprehensive discussion of the known risks, side effects and potential benefits of treatment), the patient (family) agrees to implement the following specific plan:    Start Minoxidil 1.25mg by mouth daily. Side effects again reviewed.  Dizziness. We recommend starting this at night or bedtime for the first week for this reason to see how it affects you. Typically this will be seen early within the first month if it is going to occur.  Other side effects may not appear until after the first month, or up to 3 months after starting the medication. These include  Hair thickening on the sideburn/forehead area where you have tiny thin hairs. This is reversible with stopping the minoxidil.  Abnormal heart rhythms  Rarely,swelling of the feet, sometimes hands.   Very rarely, fluid around the heart (pericardial effusion)  Stop medication and call for any side effects      Scribe Attestation      I,:  Leonie Suarez MA am acting as a scribe while in the presence of the attending physician.:       I,:  Taylor Hart MD personally performed the services described in this documentation    as scribed in my presence.:           Nell Bashir MD  Dermatology, PGY-2

## 2025-05-13 ENCOUNTER — OFFICE VISIT (OUTPATIENT)
Age: 40
End: 2025-05-13
Payer: COMMERCIAL

## 2025-05-13 VITALS
HEIGHT: 67 IN | SYSTOLIC BLOOD PRESSURE: 108 MMHG | WEIGHT: 244.2 LBS | DIASTOLIC BLOOD PRESSURE: 70 MMHG | BODY MASS INDEX: 38.33 KG/M2

## 2025-05-13 DIAGNOSIS — N93.9 ABNORMAL UTERINE BLEEDING: ICD-10-CM

## 2025-05-13 DIAGNOSIS — Z01.419 ROUTINE GYNECOLOGICAL EXAMINATION: Primary | ICD-10-CM

## 2025-05-13 PROCEDURE — S0612 ANNUAL GYNECOLOGICAL EXAMINA: HCPCS | Performed by: OBSTETRICS & GYNECOLOGY

## 2025-05-13 NOTE — PROGRESS NOTES
Sandrita Germaine  1985      CC:  Yearly exam    S:  39 y.o. female here for yearly exam.   She denies vaginal discharge, itching, pelvic pain.     Would like to consider hysterectomy due to her bleeding.   Also has noted symptoms of prolapse, can feel her cervix sometimes very low.     Period Cycle (Days):  (16- 24)  Period Duration (Days): 4  Period Pattern: (!) Irregular  Menstrual Flow: Heavy  Menstrual Control: Tampon  Menstrual Control Change Freq (Hours): 1  Dysmenorrhea: (!) Severe  Dysmenorrhea Symptoms: Cramping    She has no urinary concerns, does have occasional stress incontinence.  No bowel concerns.  No breast concerns.     Sexual activity: She is sexually active without pain, bleeding or dryness.   She is  and monogamous.   She is not interested in STD screening today.     Contraception:  None, not planning another pregnancy.  Has been counseled should not become pregnant on Wegovy/spironolactone.     Last Pap: 3/18/24 - NILM, Neg HPV  Last Mammo:  5/24/23 - [facility] 3, follow up at age 41    We reviewed ASCCP guidelines for Pap testing today.     Family hx of breast cancer: no  Family hx of ovarian cancer: no  Family hx of colon cancer: no      Current Outpatient Medications:     albuterol (Ventolin HFA) 90 mcg/act inhaler, Inhale 2 puffs every 4 (four) hours as needed for wheezing or shortness of breath, Disp: 18 g, Rfl: 0    metFORMIN (GLUCOPHAGE-XR) 500 mg 24 hr tablet, Take 1 tablet (500 mg total) by mouth daily with dinner, Disp: 90 tablet, Rfl: 1    minoxidil (LONITEN) 2.5 mg tablet, Take 0.5 tablets (1.25 mg total) by mouth daily, Disp: 45 tablet, Rfl: 3    ondansetron (ZOFRAN-ODT) 8 mg disintegrating tablet, Take 1 tablet (8 mg total) by mouth every 8 (eight) hours as needed for nausea for up to 3 days, Disp: 10 tablet, Rfl: 0    Semaglutide-Weight Management (Wegovy) 2.4 MG/0.75ML, Inject 2.4 mg under the skin weekly, Disp: 3 mL, Rfl: 3    spironolactone (ALDACTONE) 50 mg tablet,  Take 1 pill (50 mg) once daily for two weeks. Take with large glass of water. If well-tolerated, increase to 2 pills (100 mg) daily. Avoid large amounts of high potassium foods while on this medication., Disp: 180 tablet, Rfl: 3    clindamycin (CLEOCIN T) 1 % lotion, Apply topically 2 (two) times a day (Patient not taking: Reported on 11/5/2024), Disp: 60 mL, Rfl: 3    minoxidil (LONITEN) 2.5 mg tablet, Take 0.5 tablets (1.25 mg total) by mouth daily at bedtime, Disp: 7 tablet, Rfl: 2  Patient Active Problem List   Diagnosis    Allergic rhinitis    Moderate persistent asthma    Fatigue    Migraine without status migrainosus, not intractable    Hyperglycemia    Dyslipidemia    Morbid obesity with BMI of 40.0-44.9, adult (HCC)    Back skin lesion    Malignant melanoma of left upper extremity including shoulder (HCC)    Lipoma    Encounter for follow-up surveillance of melanoma    Hidradenitis suppurativa     Past Medical History:   Diagnosis Date    Asthma     had vaccine     Cancer (HCC)     melanoma    Diabetes mellitus (HCC)     gestational DM diet controlled    Gestational diabetes 2/27/2018    Infertility 02/03/2017    only got  sperm tested, naturally conceived     Lumbar strain 07/16/2015    Melanoma (HCC)     Punch Bx left upper extremity    Pneumonia     Varicella     chicken pox as child    Visual impairment     eyewear      Family History   Problem Relation Age of Onset    Multiple sclerosis Mother     Skin cancer Mother     Cancer Mother     Other Mother         Myocardial bridge    Diabetes Father     Other Father         Pericarditis    Miscarriages / Stillbirths Sister     Asthma Maternal Grandmother     Diabetes Paternal Grandmother     No Known Problems Son     Learning disabilities Other     No Known Problems Paternal Aunt     No Known Problems Maternal Aunt     Breast cancer Neg Hx     Colon cancer Neg Hx     Ovarian cancer Neg Hx     Uterine cancer Neg Hx     Cervical cancer Neg Hx      "      Review of Systems   Respiratory: Negative.    Cardiovascular: Negative.    Gastrointestinal: Negative for constipation and diarrhea.     O:  Blood pressure 108/70, height 5' 7\" (1.702 m), weight 111 kg (244 lb 3.2 oz), last menstrual period 05/12/2025, not currently breastfeeding.    Patient appears well and is not in distress  Breasts are symmetrical without mass, tenderness, nipple discharge, skin changes or adenopathy.   Abdomen is soft and nontender without masses.   External genitals are normal without lesions or rashes.  Urethral meatus and urethra are normal  Bladder is normal to palpation  Vagina is normal without discharge or bleeding.   Cervix is normal without discharge or lesion.   Uterus is top normal in size, mobile, nontender without palpable mass.  Adnexa are normal, nontender, without palpable mass.     A:  Yearly exam.     P:   Pap & HPV up to date   Mammo age 41   Pelvic US ordered, consider hyst when ready - likely with urogyn due to concurrent prolapse/ZEESHAN.   RTO one year for yearly exam or sooner as needed.      "

## 2025-05-15 ENCOUNTER — OFFICE VISIT (OUTPATIENT)
Dept: FAMILY MEDICINE CLINIC | Facility: CLINIC | Age: 40
End: 2025-05-15
Payer: COMMERCIAL

## 2025-05-15 VITALS
SYSTOLIC BLOOD PRESSURE: 104 MMHG | BODY MASS INDEX: 38.14 KG/M2 | HEART RATE: 89 BPM | DIASTOLIC BLOOD PRESSURE: 80 MMHG | OXYGEN SATURATION: 98 % | WEIGHT: 243 LBS | RESPIRATION RATE: 16 BRPM | HEIGHT: 67 IN | TEMPERATURE: 98 F

## 2025-05-15 DIAGNOSIS — M54.42 CHRONIC BILATERAL LOW BACK PAIN WITH BILATERAL SCIATICA: Primary | ICD-10-CM

## 2025-05-15 DIAGNOSIS — G89.29 CHRONIC BILATERAL LOW BACK PAIN WITH BILATERAL SCIATICA: Primary | ICD-10-CM

## 2025-05-15 DIAGNOSIS — E66.09 CLASS 2 OBESITY DUE TO EXCESS CALORIES WITHOUT SERIOUS COMORBIDITY WITH BODY MASS INDEX (BMI) OF 38.0 TO 38.9 IN ADULT: ICD-10-CM

## 2025-05-15 DIAGNOSIS — M54.41 CHRONIC BILATERAL LOW BACK PAIN WITH BILATERAL SCIATICA: Primary | ICD-10-CM

## 2025-05-15 DIAGNOSIS — E66.812 CLASS 2 OBESITY DUE TO EXCESS CALORIES WITHOUT SERIOUS COMORBIDITY WITH BODY MASS INDEX (BMI) OF 38.0 TO 38.9 IN ADULT: ICD-10-CM

## 2025-05-15 PROBLEM — E66.01 MORBID OBESITY WITH BMI OF 40.0-44.9, ADULT (HCC): Status: RESOLVED | Noted: 2022-10-18 | Resolved: 2025-05-15

## 2025-05-15 PROCEDURE — 99214 OFFICE O/P EST MOD 30 MIN: CPT | Performed by: NURSE PRACTITIONER

## 2025-05-15 RX ORDER — TIRZEPATIDE 2.5 MG/.5ML
2.5 INJECTION, SOLUTION SUBCUTANEOUS WEEKLY
Qty: 2 ML | Refills: 0 | Status: SHIPPED | OUTPATIENT
Start: 2025-05-15 | End: 2025-06-12

## 2025-05-15 NOTE — PROGRESS NOTES
Name: Sandrita Herron      : 1985      MRN: [id number]  Encounter Provider: NIMISHA Guerrero  Encounter Date: 5/15/2025   Encounter department: [facility]  :  Assessment & Plan  Chronic bilateral low back pain with bilateral sciatica  X-rays of the lumbar spine and bilateral sacroiliac joints were ordered to assess for causes of the patient's chronic lower back pain.  Patient was not interested in seeing PT or pain management at this time.  She was advised to continue using ibuprofen and Tylenol as needed for her pain.  Orders:  •  XR spine lumbar minimum 4 views non injury; Future  •  XR sacroiliac joints 3+ views; Future    Class 2 obesity due to excess calories without serious comorbidity with body mass index (BMI) of 38.0 to 38.9 in adult  Wegovy was discontinued and patient will be trialed on Zepbound 2.5 mg weekly instead.  Patient was advised that if she is tolerating the medication well at the end of the month she should make the office aware and I will titrate the dosage upward.  I will have her return to the office in 3 months for a weight check.    Orders:  •  tirzepatide (Zepbound) 2.5 mg/0.5 mL auto-injector; Inject 0.5 mL (2.5 mg total) under the skin once a week for 28 days          Depression Screening and Follow-up Plan: Patient was screened for depression during today's encounter. They screened negative with a PHQ-2 score of 0.        History of Present Illness   Back pain: Patient does report a history of chronic lower back pain.  She does report that the pain is in the bilateral lower back as well as the midline lumbar spine.  She does report intermittent episodes of radiation of the pain into the bilateral buttocks and bilateral lower extremities.  She does report that she does attempt to complete lower back exercises at home but this only somewhat improves her symptoms.  She denies any associated numbness or paresthesias.  She also denies any prior or recent  "injuries to these affected areas.  She does report that she did have imaging many years ago which did show some irregularities in the alignment of her hips.  She is agreeable to having repeat imaging completed at this time.    Obesity: Patient is currently managed on Wegovy 2.4 mg weekly to help with weight loss.  Since beginning the medication in September 2024 she has lost 43 pounds.  The patient reports that over the past 6 weeks she has not lost any weight so she is interested in switching from Wegovy to Zepbound to see if this promotes continued weight loss.      Review of Systems   Constitutional:  Negative for appetite change, chills and fever.   HENT:  Negative for ear pain and sore throat.    Eyes:  Negative for pain and visual disturbance.   Respiratory:  Negative for cough, chest tightness and shortness of breath.    Cardiovascular:  Negative for chest pain, palpitations and leg swelling.   Gastrointestinal:  Negative for abdominal pain, constipation, diarrhea, nausea and vomiting.   Endocrine: Negative for cold intolerance and heat intolerance.   Genitourinary:  Negative for decreased urine volume, difficulty urinating, dysuria and hematuria.   Musculoskeletal:  Positive for back pain (lower-chronic). Negative for arthralgias and myalgias.   Skin:  Negative for color change, rash and wound.   Allergic/Immunologic: Negative for environmental allergies.   Neurological:  Negative for dizziness, seizures, syncope, light-headedness and headaches.   Hematological:  Negative for adenopathy.   Psychiatric/Behavioral:  Negative for confusion, dysphoric mood and sleep disturbance. The patient is not nervous/anxious.    All other systems reviewed and are negative.      Objective   /80 (BP Location: Right arm, Patient Position: Sitting, Cuff Size: Adult)   Pulse 89   Temp 98 °F (36.7 °C) (Temporal)   Resp 16   Ht 5' 7\" (1.702 m)   Wt 110 kg (243 lb)   LMP 05/12/2025 (Exact Date)   SpO2 98%   BMI 38.06 " kg/m²      Physical Exam  Vitals and nursing note reviewed.   Constitutional:       General: She is not in acute distress.     Appearance: Normal appearance. She is well-developed. She is not ill-appearing.   HENT:      Head: Normocephalic.     Eyes:      Conjunctiva/sclera: Conjunctivae normal.       Cardiovascular:      Rate and Rhythm: Normal rate and regular rhythm.      Pulses: Normal pulses.           Carotid pulses are 2+ on the right side and 2+ on the left side.       Radial pulses are 2+ on the right side and 2+ on the left side.        Posterior tibial pulses are 2+ on the right side and 2+ on the left side.      Heart sounds: Normal heart sounds. No murmur heard.  Pulmonary:      Effort: Pulmonary effort is normal. No respiratory distress.      Breath sounds: Normal breath sounds. No decreased breath sounds, wheezing, rhonchi or rales.   Abdominal:      General: Abdomen is flat. There is no distension.      Palpations: Abdomen is soft.      Tenderness: There is no abdominal tenderness. There is no guarding.     Musculoskeletal:         General: No swelling. Normal range of motion.      Cervical back: Normal range of motion and neck supple.      Lumbar back: Tenderness and bony tenderness present. No swelling, edema or spasms. Normal range of motion. Negative right straight leg raise test and negative left straight leg raise test.      Right lower leg: No edema.      Left lower leg: No edema.      Comments: Patient had noted pain with palpation of the midline lumbar spine at around the level of L4-L5.  She also reported pain with palpation of the bilateral lumbar paraspinals.  No pain was noted with palpation of the bilateral SI notches.  Patient did have a full range of motion of the lumbar spine but did report increased pain with bending or twisting motions.     Skin:     General: Skin is warm and dry.      Capillary Refill: Capillary refill takes less than 2 seconds.     Neurological:      General: No  focal deficit present.      Mental Status: She is alert and oriented to person, place, and time.     Psychiatric:         Mood and Affect: Mood normal.         Behavior: Behavior normal.         Thought Content: Thought content normal.         Judgment: Judgment normal.

## 2025-05-15 NOTE — ASSESSMENT & PLAN NOTE
Wegovy was discontinued and patient will be trialed on Zepbound 2.5 mg weekly instead.  Patient was advised that if she is tolerating the medication well at the end of the month she should make the office aware and I will titrate the dosage upward.  I will have her return to the office in 3 months for a weight check.    Orders:  •  tirzepatide (Zepbound) 2.5 mg/0.5 mL auto-injector; Inject 0.5 mL (2.5 mg total) under the skin once a week for 28 days

## 2025-05-15 NOTE — ASSESSMENT & PLAN NOTE
X-rays of the lumbar spine and bilateral sacroiliac joints were ordered to assess for causes of the patient's chronic lower back pain.  Patient was not interested in seeing PT or pain management at this time.  She was advised to continue using ibuprofen and Tylenol as needed for her pain.  Orders:  •  XR spine lumbar minimum 4 views non injury; Future  •  XR sacroiliac joints 3+ views; Future

## 2025-05-16 ENCOUNTER — TELEPHONE (OUTPATIENT)
Age: 40
End: 2025-05-16

## 2025-05-16 NOTE — TELEPHONE ENCOUNTER
PA for tirzepatide (Zepbound) 2.5 mg/0.5 mL auto-injector SUBMITTED to       via      [x][facility]-Case ID # [id number]       [x]PA sent as URGENT    All office notes, labs and other pertaining documents and studies sent. Clinical questions answered. Awaiting determination from insurance company.     Turnaround time for your insurance to make a decision on your Prior Authorization can take 7-21 business days.

## 2025-05-19 NOTE — TELEPHONE ENCOUNTER
PA for Zepbound 2.5mg/0.5mL APPROVED     Date(s) approved 05/16/2025-05/16/2026    Case #[id number]     Patient advised by          []MyChart Message  []Phone call   [x]LMOM  []L/M to call office as no active Communication consent on file  []Unable to leave detailed message as VM not approved on Communication consent       Pharmacy advised by    [x]Fax  []Phone call  []Secure Chat    Approval letter scanned into Media Yes

## 2025-06-05 ENCOUNTER — TELEPHONE (OUTPATIENT)
Dept: HEMATOLOGY ONCOLOGY | Facility: CLINIC | Age: 40
End: 2025-06-05

## 2025-06-05 NOTE — TELEPHONE ENCOUNTER
LVM indicating that Dr. Clarke will be unavailable on 6/12, so appointment was moved up one day to 6/11. Provided new appointment time of 10:20am, and left [employer] number for callbacks.    Additionally, LVM regarding labs.

## 2025-06-09 ENCOUNTER — APPOINTMENT (OUTPATIENT)
Dept: LAB | Facility: HOSPITAL | Age: 40
End: 2025-06-09
Payer: COMMERCIAL

## 2025-06-09 DIAGNOSIS — C43.62 MALIGNANT MELANOMA OF LEFT UPPER EXTREMITY INCLUDING SHOULDER (HCC): ICD-10-CM

## 2025-06-09 LAB
ALBUMIN SERPL BCG-MCNC: 4.1 G/DL (ref 3.5–5)
ALP SERPL-CCNC: 56 U/L (ref 34–104)
ALT SERPL W P-5'-P-CCNC: 14 U/L (ref 7–52)
ANION GAP SERPL CALCULATED.3IONS-SCNC: 4 MMOL/L (ref 4–13)
AST SERPL W P-5'-P-CCNC: 15 U/L (ref 13–39)
BASOPHILS # BLD AUTO: 0.02 THOUSANDS/ÂΜL (ref 0–0.1)
BASOPHILS NFR BLD AUTO: 0 % (ref 0–1)
BILIRUB SERPL-MCNC: 0.57 MG/DL (ref 0.2–1)
BUN SERPL-MCNC: 14 MG/DL (ref 5–25)
CALCIUM SERPL-MCNC: 8.9 MG/DL (ref 8.4–10.2)
CHLORIDE SERPL-SCNC: 107 MMOL/L (ref 96–108)
CO2 SERPL-SCNC: 28 MMOL/L (ref 21–32)
CREAT SERPL-MCNC: 0.86 MG/DL (ref 0.6–1.3)
EOSINOPHIL # BLD AUTO: 0.27 THOUSAND/ÂΜL (ref 0–0.61)
EOSINOPHIL NFR BLD AUTO: 6 % (ref 0–6)
ERYTHROCYTE [DISTWIDTH] IN BLOOD BY AUTOMATED COUNT: 12.4 % (ref 11.6–15.1)
GFR SERPL CREATININE-BSD FRML MDRD: 85 ML/MIN/1.73SQ M
GLUCOSE P FAST SERPL-MCNC: 94 MG/DL (ref 65–99)
HCT VFR BLD AUTO: 37 % (ref 34.8–46.1)
HGB BLD-MCNC: 12.2 G/DL (ref 11.5–15.4)
IMM GRANULOCYTES # BLD AUTO: 0.01 THOUSAND/UL (ref 0–0.2)
IMM GRANULOCYTES NFR BLD AUTO: 0 % (ref 0–2)
LDH SERPL-CCNC: 125 U/L (ref 140–271)
LYMPHOCYTES # BLD AUTO: 1.23 THOUSANDS/ÂΜL (ref 0.6–4.47)
LYMPHOCYTES NFR BLD AUTO: 26 % (ref 14–44)
MCH RBC QN AUTO: 29.6 PG (ref 26.8–34.3)
MCHC RBC AUTO-ENTMCNC: 33 G/DL (ref 31.4–37.4)
MCV RBC AUTO: 90 FL (ref 82–98)
MONOCYTES # BLD AUTO: 0.39 THOUSAND/ÂΜL (ref 0.17–1.22)
MONOCYTES NFR BLD AUTO: 8 % (ref 4–12)
NEUTROPHILS # BLD AUTO: 2.74 THOUSANDS/ÂΜL (ref 1.85–7.62)
NEUTS SEG NFR BLD AUTO: 60 % (ref 43–75)
NRBC BLD AUTO-RTO: 0 /100 WBCS
PLATELET # BLD AUTO: 249 THOUSANDS/UL (ref 149–390)
PMV BLD AUTO: 9.6 FL (ref 8.9–12.7)
POTASSIUM SERPL-SCNC: 4.5 MMOL/L (ref 3.5–5.3)
PROT SERPL-MCNC: 6.5 G/DL (ref 6.4–8.4)
RBC # BLD AUTO: 4.12 MILLION/UL (ref 3.81–5.12)
SODIUM SERPL-SCNC: 139 MMOL/L (ref 135–147)
WBC # BLD AUTO: 4.66 THOUSAND/UL (ref 4.31–10.16)

## 2025-06-09 PROCEDURE — 83615 LACTATE (LD) (LDH) ENZYME: CPT

## 2025-06-09 PROCEDURE — 80053 COMPREHEN METABOLIC PANEL: CPT

## 2025-06-09 PROCEDURE — 36415 COLL VENOUS BLD VENIPUNCTURE: CPT

## 2025-06-09 PROCEDURE — 85025 COMPLETE CBC W/AUTO DIFF WBC: CPT

## 2025-06-11 ENCOUNTER — OFFICE VISIT (OUTPATIENT)
Dept: HEMATOLOGY ONCOLOGY | Facility: CLINIC | Age: 40
End: 2025-06-11
Payer: COMMERCIAL

## 2025-06-11 VITALS
TEMPERATURE: 97.9 F | HEART RATE: 88 BPM | WEIGHT: 241 LBS | OXYGEN SATURATION: 98 % | BODY MASS INDEX: 37.83 KG/M2 | SYSTOLIC BLOOD PRESSURE: 110 MMHG | HEIGHT: 67 IN | DIASTOLIC BLOOD PRESSURE: 74 MMHG | RESPIRATION RATE: 16 BRPM

## 2025-06-11 DIAGNOSIS — Z08 ENCOUNTER FOR FOLLOW-UP SURVEILLANCE OF MELANOMA: Primary | ICD-10-CM

## 2025-06-11 DIAGNOSIS — Z85.820 ENCOUNTER FOR FOLLOW-UP SURVEILLANCE OF MELANOMA: Primary | ICD-10-CM

## 2025-06-11 DIAGNOSIS — C43.62 MALIGNANT MELANOMA OF LEFT UPPER EXTREMITY INCLUDING SHOULDER (HCC): ICD-10-CM

## 2025-06-11 PROCEDURE — 99213 OFFICE O/P EST LOW 20 MIN: CPT | Performed by: INTERNAL MEDICINE

## 2025-06-11 NOTE — LETTER
Tiny 15, 2025     Nisha Garcia MD  [address]  [address]  [facility] [address]    Patient: Sandrita Herron   YOB: 1985   Date of Visit: 2025       Dear MD Taylor Mercer MD Timothy Schmeltzle, :    Thank you for referring Sandrita Herron to me for evaluation. Below are my notes for this consultation.    If you have questions, please do not hesitate to call me. I look forward to following your patient along with you.         Sincerely,        Alaina Clarke MD        CC: MD Du Castellanos DO Melissa A Wilson, MD  6/15/2025  4:43 PM  Sign when Signing Visit  Name: Sandrita Herron      : 1985      MRN: [id number]  Encounter Provider: Alaina Clarke MD  Encounter Date: 2025   Encounter department: [facility]  :  Assessment & Plan  Encounter for follow-up surveillance of melanoma  Ms. Herron is a 40-year-old female with stage Ib melanoma here for continued monitoring, follow-up and surveillance.  Clinically no evidence of disease recurrence.  Continue to follow closely with dermatology.Laboratory results are within normal limits, including LDH levels which are low but not concerning. The patient is currently two years post-diagnosis from May 2023. Continued monitoring and follow-up are planned for an additional two years, with visits scheduled every six months. The highest risk of complications is within the first two to three years post-diagnosis. The patient was advised to contact the clinic if any changes in her condition are observed.  She will return in 6 months with blood work at that time.  Orders placed.  She knows to call with issues or concerns prior to her next visit.         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 Herron is a 40-year-old female with stage Ib melanoma here for continued monitoring, follow-up and surveillance.   Clinically no evidence of disease recurrence.  Continue to follow closely with dermatology.Laboratory results are within normal limits, including LDH levels which are low but not concerning. The patient is currently two years post-diagnosis from May 2023. Continued monitoring and follow-up are planned for an additional two years, with visits scheduled every six months. The highest risk of complications is within the first two to three years post-diagnosis. The patient was advised to contact the clinic if any changes in her condition are observed.  She will return in 6 months with blood work at that time.  Orders placed.  She knows to call with issues or concerns prior to her next visit.    Orders:  •  CBC and differential; Future  •  Comprehensive metabolic panel; Future  •  LD,Blood; Future      Return for Office Visit, labs.    History of Present Illness  Chief Complaint   Patient presents with   • Follow-up     History of Present Illness  The patient is a 40-year-old female with stage Ib melanoma here for continued monitoring, follow-up, and surveillance. She had blood work prior to her visit that has been reviewed and all within normal limits with no concerning findings. She continues to follow closely with dermatology. She is 2 years out from diagnosis with my diagnosis in 05/2023.    She reports satisfactory skin condition with no new lumps or bumps. She has been under the care of Dr. Hart, whom she consults every 3 to 6 months. She expresses concern about a recent drop in her LDH levels, which has been explained as normal and not worrisome. Her energy levels remain appropriate. She notes sensitivity around her scar but does not report any other issues.      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)  Staging form: Melanoma of the Skin, AJCC 8th Edition  - Clinical stage from 9/28/2023: Stage IB (cT2a, cN0, cM0) - Signed by Alaina Clarke MD on  12/7/2023  - Pathologic: Stage IB (pT2a, pN0, cM0) - Signed by Nisha Garcia MD on 1/23/2024  Oncology History   Malignant melanoma of left upper extremity including shoulder (HCC)   5/18/2023 Biopsy    Left upper arm, shave biopsy   Thickness 1.2mm  1 Mitoses   No ulceration        9/28/2023 -  Cancer Staged    Staging form: Melanoma of the Skin, AJCC 8th Edition  - Clinical stage from 9/28/2023: Stage IB (cT2a, cN0, cM0) - Signed by Alaina Clarke MD on 12/7/2023 11/22/2023 Initial Diagnosis    Malignant melanoma of left upper extremity including shoulder (HCC)     1/12/2024 Surgery    B. Lymph node, left axillary sentinel lymph node #1:     -Two lymph nodes; one lymph node with rare solitary cells (<0.1 mm in diameter) suspicious (though not definitive) for metastatic melanoma cells (1/2 lymph nodes) (see comment).     -Incidental capsular/trabecular nevus.        C. Skin, left upper arm, excision:     -Residual focal atypical dermal melanocytic proliferation, consistent with residual melanoma (thickness: 0.7 mm); not seen at examined inked specimen margins.     -Prior procedure site changes present.     1/23/2024 -  Cancer Staged    Staging form: Melanoma of the Skin, AJCC 8th Edition  - Pathologic: Stage IB (pT2a, pN0, cM0) - Signed by Nisha Garcia MD on 1/23/2024               Review of Systems   Constitutional:  Negative for activity change, chills, diaphoresis, fatigue, fever and unexpected weight change.   HENT:  Negative for congestion, hearing loss, trouble swallowing and voice change.    Respiratory:  Negative for cough, shortness of breath and wheezing.    Cardiovascular:  Negative for chest pain, palpitations and leg swelling.   Gastrointestinal:  Negative for abdominal distention, abdominal pain, constipation, diarrhea, nausea and vomiting.   Musculoskeletal:  Negative for arthralgias and myalgias.   Skin:  Negative for color change and rash.   Neurological:  Negative for dizziness,  "seizures, speech difficulty, weakness, light-headedness and headaches.   Hematological:  Negative for adenopathy.     Medications Ordered Prior to Encounter[1]       Objective  /74 (BP Location: Right arm, Patient Position: Sitting, Cuff Size: Adult)   Pulse 88   Temp 97.9 °F (36.6 °C) (Temporal)   Resp 16   Ht 5' 7\" (1.702 m)   Wt 109 kg (241 lb)   LMP 05/12/2025 (Exact Date)   SpO2 98%   BMI 37.75 kg/m²     Pain Screening:  Pain Score: 0-No pain  ECOG ECOG Performance Status: 0 - Fully active, able to carry on all pre-disease performance without restriction     Physical Exam  Constitutional:       General: She is not in acute distress.     Appearance: Normal appearance. She is not ill-appearing.   HENT:      Head: Normocephalic and atraumatic.      Right Ear: External ear normal.      Left Ear: External ear normal.      Nose: Nose normal. No congestion.      Mouth/Throat:      Mouth: Mucous membranes are moist.      Pharynx: Oropharynx is clear. No oropharyngeal exudate.     Eyes:      General: No scleral icterus.        Right eye: No discharge.         Left eye: No discharge.      Conjunctiva/sclera: Conjunctivae normal.       Cardiovascular:      Rate and Rhythm: Normal rate and regular rhythm.      Pulses: Normal pulses.      Heart sounds: Normal heart sounds. No murmur heard.     No friction rub. No gallop.   Pulmonary:      Effort: Pulmonary effort is normal. No respiratory distress.      Breath sounds: Normal breath sounds. No wheezing.   Abdominal:      General: Bowel sounds are normal. There is no distension.      Palpations: There is no mass.      Tenderness: There is no abdominal tenderness. There is no rebound.     Musculoskeletal:         General: No swelling or tenderness. Normal range of motion.      Cervical back: Normal range of motion. No rigidity.      Right lower leg: No edema.      Left lower leg: No edema.   Lymphadenopathy:      Head:      Right side of head: No submental, " submandibular, preauricular, posterior auricular or occipital adenopathy.      Left side of head: No submental, submandibular, preauricular, posterior auricular or occipital adenopathy.      Cervical: No cervical adenopathy.      Right cervical: No superficial or posterior cervical adenopathy.     Left cervical: No superficial or posterior cervical adenopathy.      Upper Body:      Right upper body: No supraclavicular or axillary adenopathy.      Left upper body: No supraclavicular or axillary adenopathy.     Skin:     General: Skin is warm.      Coloration: Skin is not jaundiced.      Findings: No lesion or rash.      Comments: Scar well healed.  No evidence of recurrence at primary site.  No concerning skin lesions     Neurological:      General: No focal deficit present.      Mental Status: She is alert and oriented to person, place, and time.      Cranial Nerves: No cranial nerve deficit.      Motor: No weakness.      Gait: Gait normal.     Psychiatric:         Mood and Affect: Mood normal.         Behavior: Behavior normal.         Thought Content: Thought content normal.         Judgment: Judgment normal.       Physical Exam  Cardiovascular: Heart sounds are normal.  Respiratory: Lungs are clear.  Integument/Skin: No lumps or bumps noted on the skin.    Results  Labs   - LDH: Low  Labs: I have reviewed the following labs:  Lab Results   Component Value Date/Time    WBC 4.66 06/09/2025 09:12 AM    RBC 4.12 06/09/2025 09:12 AM    Hemoglobin 12.2 06/09/2025 09:12 AM    Hematocrit 37.0 06/09/2025 09:12 AM    MCV 90 06/09/2025 09:12 AM    MCH 29.6 06/09/2025 09:12 AM    RDW 12.4 06/09/2025 09:12 AM    Platelets 249 06/09/2025 09:12 AM    Segmented % 60 06/09/2025 09:12 AM    Lymphocytes % 26 06/09/2025 09:12 AM    Monocytes % 8 06/09/2025 09:12 AM    Eosinophils Relative 6 06/09/2025 09:12 AM    Basophils Relative 0 06/09/2025 09:12 AM    Immature Grans % 0 06/09/2025 09:12 AM    Absolute Neutrophils 2.74  06/09/2025 09:12 AM     Lab Results   Component Value Date/Time    Potassium 4.5 06/09/2025 09:12 AM    Chloride 107 06/09/2025 09:12 AM    CO2 28 06/09/2025 09:12 AM    BUN 14 06/09/2025 09:12 AM    Creatinine 0.86 06/09/2025 09:12 AM    Glucose, Fasting 94 06/09/2025 09:12 AM    Calcium 8.9 06/09/2025 09:12 AM    AST 15 06/09/2025 09:12 AM    ALT 14 06/09/2025 09:12 AM    Alkaline Phosphatase 56 06/09/2025 09:12 AM    Total Protein 6.5 06/09/2025 09:12 AM    Albumin 4.1 06/09/2025 09:12 AM    Total Bilirubin 0.57 06/09/2025 09:12 AM    eGFR 85 06/09/2025 09:12 AM     Lab Results   Component Value Date/Time    TSH 3RD GENERATION 1.530 12/05/2024 11:19 AM    Free T4 0.83 12/05/2024 11:19 AM     (L) 06/09/2025 09:12 AM        Radiology Results Review : No pertinent imaging studies reviewed.        Alaina Clarke MD, PhD       [1]   Current Outpatient Medications on File Prior to Visit   Medication Sig Dispense Refill   • albuterol (Ventolin HFA) 90 mcg/act inhaler Inhale 2 puffs every 4 (four) hours as needed for wheezing or shortness of breath 18 g 0   • metFORMIN (GLUCOPHAGE-XR) 500 mg 24 hr tablet Take 1 tablet (500 mg total) by mouth daily with dinner 90 tablet 1   • minoxidil (LONITEN) 2.5 mg tablet Take 0.5 tablets (1.25 mg total) by mouth daily 45 tablet 3   • ondansetron (ZOFRAN-ODT) 8 mg disintegrating tablet Take 1 tablet (8 mg total) by mouth every 8 (eight) hours as needed for nausea for up to 3 days 10 tablet 0   • spironolactone (ALDACTONE) 50 mg tablet Take 1 pill (50 mg) once daily for two weeks. Take with large glass of water. If well-tolerated, increase to 2 pills (100 mg) daily. Avoid large amounts of high potassium foods while on this medication. 180 tablet 3     No current facility-administered medications on file prior to visit.

## 2025-06-15 NOTE — PROGRESS NOTES
Name: Sandrita Herron      : 1985      MRN: [id number]  Encounter Provider: Alaina Clarke MD  Encounter Date: 2025   Encounter department: [facility]  :  Assessment & Plan  Encounter for follow-up surveillance of melanoma  Ms. Herron is a 40-year-old female with stage Ib melanoma here for continued monitoring, follow-up and surveillance.  Clinically no evidence of disease recurrence.  Continue to follow closely with dermatology.Laboratory results are within normal limits, including LDH levels which are low but not concerning. The patient is currently two years post-diagnosis from May 2023. Continued monitoring and follow-up are planned for an additional two years, with visits scheduled every six months. The highest risk of complications is within the first two to three years post-diagnosis. The patient was advised to contact the clinic if any changes in her condition are observed.  She will return in 6 months with blood work at that time.  Orders placed.  She knows to call with issues or concerns prior to her next visit.         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 Herron is a 40-year-old female with stage Ib melanoma here for continued monitoring, follow-up and surveillance.  Clinically no evidence of disease recurrence.  Continue to follow closely with dermatology.Laboratory results are within normal limits, including LDH levels which are low but not concerning. The patient is currently two years post-diagnosis from May 2023. Continued monitoring and follow-up are planned for an additional two years, with visits scheduled every six months. The highest risk of complications is within the first two to three years post-diagnosis. The patient was advised to contact the clinic if any changes in her condition are observed.  She will return in 6 months with blood work at that time.  Orders placed.  She knows to call with  issues or concerns prior to her next visit.    Orders:    CBC and differential; Future    Comprehensive metabolic panel; Future    LD,Blood; Future      Return for Office Visit, labs.    History of Present Illness   Chief Complaint   Patient presents with    Follow-up     History of Present Illness  The patient is a 40-year-old female with stage Ib melanoma here for continued monitoring, follow-up, and surveillance. She had blood work prior to her visit that has been reviewed and all within normal limits with no concerning findings. She continues to follow closely with dermatology. She is 2 years out from diagnosis with my diagnosis in 05/2023.    She reports satisfactory skin condition with no new lumps or bumps. She has been under the care of Dr. Hart, whom she consults every 3 to 6 months. She expresses concern about a recent drop in her LDH levels, which has been explained as normal and not worrisome. Her energy levels remain appropriate. She notes sensitivity around her scar but does not report any other issues.      Oncology History   Cancer Staging   Malignant melanoma of left upper extremity including shoulder (HCC)  Staging form: Melanoma of the Skin, AJCC 8th Edition  - Clinical stage from 9/28/2023: Stage IB (cT2a, cN0, cM0) - Signed by Alaina Clarke MD on 12/7/2023  - Pathologic: Stage IB (pT2a, pN0, cM0) - Signed by Nisha Garcia MD on 1/23/2024  Oncology History   Malignant melanoma of left upper extremity including shoulder (HCC)   5/18/2023 Biopsy    Left upper arm, shave biopsy   Thickness 1.2mm  1 Mitoses   No ulceration        9/28/2023 -  Cancer Staged    Staging form: Melanoma of the Skin, AJCC 8th Edition  - Clinical stage from 9/28/2023: Stage IB (cT2a, cN0, cM0) - Signed by Alaina Clarke MD on 12/7/2023 11/22/2023 Initial Diagnosis    Malignant melanoma of left upper extremity including shoulder (HCC)     1/12/2024 Surgery    B. Lymph node, left axillary sentinel lymph  "node #1:     -Two lymph nodes; one lymph node with rare solitary cells (<0.1 mm in diameter) suspicious (though not definitive) for metastatic melanoma cells (1/2 lymph nodes) (see comment).     -Incidental capsular/trabecular nevus.        C. Skin, left upper arm, excision:     -Residual focal atypical dermal melanocytic proliferation, consistent with residual melanoma (thickness: 0.7 mm); not seen at examined inked specimen margins.     -Prior procedure site changes present.     1/23/2024 -  Cancer Staged    Staging form: Melanoma of the Skin, AJCC 8th Edition  - Pathologic: Stage IB (pT2a, pN0, cM0) - Signed by Nisha Garica MD on 1/23/2024               Review of Systems   Constitutional:  Negative for activity change, chills, diaphoresis, fatigue, fever and unexpected weight change.   HENT:  Negative for congestion, hearing loss, trouble swallowing and voice change.    Respiratory:  Negative for cough, shortness of breath and wheezing.    Cardiovascular:  Negative for chest pain, palpitations and leg swelling.   Gastrointestinal:  Negative for abdominal distention, abdominal pain, constipation, diarrhea, nausea and vomiting.   Musculoskeletal:  Negative for arthralgias and myalgias.   Skin:  Negative for color change and rash.   Neurological:  Negative for dizziness, seizures, speech difficulty, weakness, light-headedness and headaches.   Hematological:  Negative for adenopathy.     Medications Ordered Prior to Encounter[1]       Objective   /74 (BP Location: Right arm, Patient Position: Sitting, Cuff Size: Adult)   Pulse 88   Temp 97.9 °F (36.6 °C) (Temporal)   Resp 16   Ht 5' 7\" (1.702 m)   Wt 109 kg (241 lb)   LMP 05/12/2025 (Exact Date)   SpO2 98%   BMI 37.75 kg/m²     Pain Screening:  Pain Score: 0-No pain  ECOG ECOG Performance Status: 0 - Fully active, able to carry on all pre-disease performance without restriction     Physical Exam  Constitutional:       General: She is not in acute " distress.     Appearance: Normal appearance. She is not ill-appearing.   HENT:      Head: Normocephalic and atraumatic.      Right Ear: External ear normal.      Left Ear: External ear normal.      Nose: Nose normal. No congestion.      Mouth/Throat:      Mouth: Mucous membranes are moist.      Pharynx: Oropharynx is clear. No oropharyngeal exudate.     Eyes:      General: No scleral icterus.        Right eye: No discharge.         Left eye: No discharge.      Conjunctiva/sclera: Conjunctivae normal.       Cardiovascular:      Rate and Rhythm: Normal rate and regular rhythm.      Pulses: Normal pulses.      Heart sounds: Normal heart sounds. No murmur heard.     No friction rub. No gallop.   Pulmonary:      Effort: Pulmonary effort is normal. No respiratory distress.      Breath sounds: Normal breath sounds. No wheezing.   Abdominal:      General: Bowel sounds are normal. There is no distension.      Palpations: There is no mass.      Tenderness: There is no abdominal tenderness. There is no rebound.     Musculoskeletal:         General: No swelling or tenderness. Normal range of motion.      Cervical back: Normal range of motion. No rigidity.      Right lower leg: No edema.      Left lower leg: No edema.   Lymphadenopathy:      Head:      Right side of head: No submental, submandibular, preauricular, posterior auricular or occipital adenopathy.      Left side of head: No submental, submandibular, preauricular, posterior auricular or occipital adenopathy.      Cervical: No cervical adenopathy.      Right cervical: No superficial or posterior cervical adenopathy.     Left cervical: No superficial or posterior cervical adenopathy.      Upper Body:      Right upper body: No supraclavicular or axillary adenopathy.      Left upper body: No supraclavicular or axillary adenopathy.     Skin:     General: Skin is warm.      Coloration: Skin is not jaundiced.      Findings: No lesion or rash.      Comments: Scar well healed.   No evidence of recurrence at primary site.  No concerning skin lesions     Neurological:      General: No focal deficit present.      Mental Status: She is alert and oriented to person, place, and time.      Cranial Nerves: No cranial nerve deficit.      Motor: No weakness.      Gait: Gait normal.     Psychiatric:         Mood and Affect: Mood normal.         Behavior: Behavior normal.         Thought Content: Thought content normal.         Judgment: Judgment normal.       Physical Exam  Cardiovascular: Heart sounds are normal.  Respiratory: Lungs are clear.  Integument/Skin: No lumps or bumps noted on the skin.    Results  Labs   - LDH: Low  Labs: I have reviewed the following labs:  Lab Results   Component Value Date/Time    WBC 4.66 06/09/2025 09:12 AM    RBC 4.12 06/09/2025 09:12 AM    Hemoglobin 12.2 06/09/2025 09:12 AM    Hematocrit 37.0 06/09/2025 09:12 AM    MCV 90 06/09/2025 09:12 AM    MCH 29.6 06/09/2025 09:12 AM    RDW 12.4 06/09/2025 09:12 AM    Platelets 249 06/09/2025 09:12 AM    Segmented % 60 06/09/2025 09:12 AM    Lymphocytes % 26 06/09/2025 09:12 AM    Monocytes % 8 06/09/2025 09:12 AM    Eosinophils Relative 6 06/09/2025 09:12 AM    Basophils Relative 0 06/09/2025 09:12 AM    Immature Grans % 0 06/09/2025 09:12 AM    Absolute Neutrophils 2.74 06/09/2025 09:12 AM     Lab Results   Component Value Date/Time    Potassium 4.5 06/09/2025 09:12 AM    Chloride 107 06/09/2025 09:12 AM    CO2 28 06/09/2025 09:12 AM    BUN 14 06/09/2025 09:12 AM    Creatinine 0.86 06/09/2025 09:12 AM    Glucose, Fasting 94 06/09/2025 09:12 AM    Calcium 8.9 06/09/2025 09:12 AM    AST 15 06/09/2025 09:12 AM    ALT 14 06/09/2025 09:12 AM    Alkaline Phosphatase 56 06/09/2025 09:12 AM    Total Protein 6.5 06/09/2025 09:12 AM    Albumin 4.1 06/09/2025 09:12 AM    Total Bilirubin 0.57 06/09/2025 09:12 AM    eGFR 85 06/09/2025 09:12 AM     Lab Results   Component Value Date/Time    TSH 3RD GENERATION 1.530 12/05/2024 11:19 AM     Free T4 0.83 12/05/2024 11:19 AM     (L) 06/09/2025 09:12 AM        Radiology Results Review : No pertinent imaging studies reviewed.        Alaina Clarke MD, PhD       [1]   Current Outpatient Medications on File Prior to Visit   Medication Sig Dispense Refill    albuterol (Ventolin HFA) 90 mcg/act inhaler Inhale 2 puffs every 4 (four) hours as needed for wheezing or shortness of breath 18 g 0    metFORMIN (GLUCOPHAGE-XR) 500 mg 24 hr tablet Take 1 tablet (500 mg total) by mouth daily with dinner 90 tablet 1    minoxidil (LONITEN) 2.5 mg tablet Take 0.5 tablets (1.25 mg total) by mouth daily 45 tablet 3    ondansetron (ZOFRAN-ODT) 8 mg disintegrating tablet Take 1 tablet (8 mg total) by mouth every 8 (eight) hours as needed for nausea for up to 3 days 10 tablet 0    spironolactone (ALDACTONE) 50 mg tablet Take 1 pill (50 mg) once daily for two weeks. Take with large glass of water. If well-tolerated, increase to 2 pills (100 mg) daily. Avoid large amounts of high potassium foods while on this medication. 180 tablet 3     No current facility-administered medications on file prior to visit.

## 2025-06-15 NOTE — ASSESSMENT & PLAN NOTE
Ms. Herron is a 40-year-old female with stage Ib melanoma here for continued monitoring, follow-up and surveillance.  Clinically no evidence of disease recurrence.  Continue to follow closely with dermatology.Laboratory results are within normal limits, including LDH levels which are low but not concerning. The patient is currently two years post-diagnosis from May 2023. Continued monitoring and follow-up are planned for an additional two years, with visits scheduled every six months. The highest risk of complications is within the first two to three years post-diagnosis. The patient was advised to contact the clinic if any changes in her condition are observed.  She will return in 6 months with blood work at that time.  Orders placed.  She knows to call with issues or concerns prior to her next visit.    Orders:    CBC and differential; Future    Comprehensive metabolic panel; Future    LD,Blood; Future

## 2025-06-15 NOTE — ASSESSMENT & PLAN NOTE
Ms. Herron is a 40-year-old female with stage Ib melanoma here for continued monitoring, follow-up and surveillance.  Clinically no evidence of disease recurrence.  Continue to follow closely with dermatology.Laboratory results are within normal limits, including LDH levels which are low but not concerning. The patient is currently two years post-diagnosis from May 2023. Continued monitoring and follow-up are planned for an additional two years, with visits scheduled every six months. The highest risk of complications is within the first two to three years post-diagnosis. The patient was advised to contact the clinic if any changes in her condition are observed.  She will return in 6 months with blood work at that time.  Orders placed.  She knows to call with issues or concerns prior to her next visit.

## 2025-06-25 DIAGNOSIS — E66.09 CLASS 2 OBESITY DUE TO EXCESS CALORIES WITHOUT SERIOUS COMORBIDITY WITH BODY MASS INDEX (BMI) OF 38.0 TO 38.9 IN ADULT: Primary | ICD-10-CM

## 2025-06-25 DIAGNOSIS — E66.812 CLASS 2 OBESITY DUE TO EXCESS CALORIES WITHOUT SERIOUS COMORBIDITY WITH BODY MASS INDEX (BMI) OF 38.0 TO 38.9 IN ADULT: Primary | ICD-10-CM

## 2025-06-25 RX ORDER — TIRZEPATIDE 5 MG/.5ML
5 INJECTION, SOLUTION SUBCUTANEOUS WEEKLY
Qty: 2 ML | Refills: 0 | Status: SHIPPED | OUTPATIENT
Start: 2025-06-25

## 2025-07-24 ENCOUNTER — TELEPHONE (OUTPATIENT)
Age: 40
End: 2025-07-24

## 2025-07-28 ENCOUNTER — PATIENT MESSAGE (OUTPATIENT)
Dept: FAMILY MEDICINE CLINIC | Facility: CLINIC | Age: 40
End: 2025-07-28

## 2025-07-28 DIAGNOSIS — E66.812 CLASS 2 OBESITY DUE TO EXCESS CALORIES WITHOUT SERIOUS COMORBIDITY WITH BODY MASS INDEX (BMI) OF 38.0 TO 38.9 IN ADULT: Primary | ICD-10-CM

## 2025-07-28 DIAGNOSIS — E66.09 CLASS 2 OBESITY DUE TO EXCESS CALORIES WITHOUT SERIOUS COMORBIDITY WITH BODY MASS INDEX (BMI) OF 38.0 TO 38.9 IN ADULT: Primary | ICD-10-CM

## 2025-07-29 RX ORDER — TIRZEPATIDE 7.5 MG/.5ML
7.5 INJECTION, SOLUTION SUBCUTANEOUS WEEKLY
Qty: 2 ML | Refills: 0 | Status: SHIPPED | OUTPATIENT
Start: 2025-07-29

## 2025-08-07 DIAGNOSIS — E78.5 DYSLIPIDEMIA: ICD-10-CM

## 2025-08-07 DIAGNOSIS — R73.9 HYPERGLYCEMIA: ICD-10-CM

## 2025-08-07 DIAGNOSIS — J45.41 MODERATE PERSISTENT ASTHMA WITH ACUTE EXACERBATION: Primary | ICD-10-CM

## (undated) DEVICE — 3M™ STERI-STRIP™ REINFORCED ADHESIVE SKIN CLOSURES, R1547, 1/2 IN X 4 IN (12 MM X 100 MM), 6 STRIPS/ENVELOPE: Brand: 3M™ STERI-STRIP™

## (undated) DEVICE — PENCIL ELECTROSURG E-Z CLEAN -0035H

## (undated) DEVICE — GLOVE SRG BIOGEL 6.5

## (undated) DEVICE — SMOKE EVACUATION TUBING WITH 8 IN INTEGRAL WAND AND SPONGE GUARD: Brand: BUFFALO FILTER

## (undated) DEVICE — ADHESIVE SKIN HIGH VISCOSITY EXOFIN 1ML

## (undated) DEVICE — 3M™ TEGADERM™ TRANSPARENT FILM DRESSING FRAME STYLE, 1628, 6 IN X 8 IN (15 CM X 20 CM), 10/CT 8CT/CASE: Brand: 3M™ TEGADERM™

## (undated) DEVICE — INTENDED FOR TISSUE SEPARATION, AND OTHER PROCEDURES THAT REQUIRE A SHARP SURGICAL BLADE TO PUNCTURE OR CUT.: Brand: BARD-PARKER SAFETY BLADES SIZE 15, STERILE

## (undated) DEVICE — SURGICEL 4 X 8IN

## (undated) DEVICE — SUT SILK 2-0 SH 30 IN K833H

## (undated) DEVICE — CHLORAPREP HI-LITE 26ML ORANGE

## (undated) DEVICE — SUT ETHILON 2-0 FSLX 30 IN 1674H

## (undated) DEVICE — SUT VICRYL 2-0 SH 27 IN UNDYED J417H

## (undated) DEVICE — ELECTRODE NEEDLE MOD E-Z CLEAN 2.75IN 7CM -0013M

## (undated) DEVICE — MEDI-VAC YANK SUCT HNDL W/TPRD BULBOUS TIP: Brand: CARDINAL HEALTH

## (undated) DEVICE — 3M™ STERI-STRIP™ COMPOUND BENZOIN TINCTURE 40 BAGS/CARTON 4 CARTONS/CASE C1544: Brand: 3M™ STERI-STRIP™

## (undated) DEVICE — SUT VICRYL 3-0 18 IN J110T

## (undated) DEVICE — SUT MONOCRYL 4-0 PS-2 27 IN Y426H

## (undated) DEVICE — SKIN MARKER DUAL TIP WITH RULER CAP, FLEXIBLE RULER AND LABELS: Brand: DEVON

## (undated) DEVICE — TUBING SUCTION 5MM X 12 FT

## (undated) DEVICE — BETHLEHEM UNIVERSAL MINOR GEN: Brand: CARDINAL HEALTH

## (undated) DEVICE — NEEDLE 25G X 1 1/2

## (undated) DEVICE — INTENDED FOR TISSUE SEPARATION, AND OTHER PROCEDURES THAT REQUIRE A SHARP SURGICAL BLADE TO PUNCTURE OR CUT.: Brand: BARD-PARKER ® CARBON RIB-BACK BLADES

## (undated) DEVICE — SURGICEL 2 X 3

## (undated) DEVICE — SYRINGE 5ML LL

## (undated) DEVICE — SUT MONOCRYL 5-0 P-3 18 IN Y493G

## (undated) DEVICE — ELECTRODE BLADE MOD E-Z CLEAN 2.5IN 6.4CM -0012M

## (undated) DEVICE — GAUZE SPONGES,USP TYPE VII GAUZE, 12 PLY: Brand: CURITY

## (undated) DEVICE — SUT VICRYL 3-0 SH 27 IN J416H

## (undated) DEVICE — SPECIMEN CONTAINER STERILE PEEL PACK